# Patient Record
Sex: MALE | Race: WHITE | Employment: OTHER | URBAN - METROPOLITAN AREA
[De-identification: names, ages, dates, MRNs, and addresses within clinical notes are randomized per-mention and may not be internally consistent; named-entity substitution may affect disease eponyms.]

---

## 2021-01-01 ENCOUNTER — TELEPHONE (OUTPATIENT)
Dept: CARDIOLOGY CLINIC | Age: 71
End: 2021-01-01

## 2021-01-01 ENCOUNTER — APPOINTMENT (OUTPATIENT)
Dept: CT IMAGING | Age: 71
DRG: 871 | End: 2021-01-01
Payer: MEDICARE

## 2021-01-01 ENCOUNTER — APPOINTMENT (OUTPATIENT)
Dept: NUCLEAR MEDICINE | Age: 71
DRG: 300 | End: 2021-01-01
Payer: MEDICARE

## 2021-01-01 ENCOUNTER — OFFICE VISIT (OUTPATIENT)
Dept: VASCULAR SURGERY | Age: 71
End: 2021-01-01
Payer: MEDICARE

## 2021-01-01 ENCOUNTER — APPOINTMENT (OUTPATIENT)
Dept: GENERAL RADIOLOGY | Age: 71
End: 2021-01-01
Payer: MEDICARE

## 2021-01-01 ENCOUNTER — APPOINTMENT (OUTPATIENT)
Dept: MRI IMAGING | Age: 71
DRG: 300 | End: 2021-01-01
Payer: MEDICARE

## 2021-01-01 ENCOUNTER — HOSPITAL ENCOUNTER (OUTPATIENT)
Dept: ONCOLOGY | Age: 71
Setting detail: INFUSION SERIES
Discharge: HOME | End: 2021-10-07
Payer: MEDICARE

## 2021-01-01 ENCOUNTER — HOSPITAL ENCOUNTER (INPATIENT)
Age: 71
LOS: 5 days | Discharge: HOSPICE | DRG: 871 | End: 2021-12-15
Attending: EMERGENCY MEDICINE | Admitting: INTERNAL MEDICINE
Payer: MEDICARE

## 2021-01-01 ENCOUNTER — HOSPITAL ENCOUNTER (OUTPATIENT)
Age: 71
Setting detail: OUTPATIENT SURGERY
Discharge: HOME | End: 2021-11-08
Attending: SURGERY | Admitting: SURGERY
Payer: MEDICARE

## 2021-01-01 ENCOUNTER — HOSPITAL ENCOUNTER (EMERGENCY)
Age: 71
Discharge: HOME | End: 2021-09-06
Payer: MEDICARE

## 2021-01-01 ENCOUNTER — HOSPITAL ENCOUNTER (OUTPATIENT)
Age: 71
Discharge: HOME | End: 2021-10-14
Payer: MEDICARE

## 2021-01-01 ENCOUNTER — ANTI-COAG VISIT (OUTPATIENT)
Dept: PHARMACY | Age: 71
End: 2021-01-01
Payer: MEDICARE

## 2021-01-01 ENCOUNTER — APPOINTMENT (OUTPATIENT)
Dept: GENERAL RADIOLOGY | Age: 71
End: 2021-01-01
Attending: SURGERY
Payer: MEDICARE

## 2021-01-01 ENCOUNTER — ANESTHESIA EVENT (OUTPATIENT)
Dept: OPERATING ROOM | Age: 71
End: 2021-01-01
Payer: MEDICARE

## 2021-01-01 ENCOUNTER — HOSPITAL ENCOUNTER (OUTPATIENT)
Dept: VASCULAR LAB | Age: 71
Discharge: HOME | End: 2021-09-07
Payer: MEDICARE

## 2021-01-01 ENCOUNTER — APPOINTMENT (OUTPATIENT)
Dept: CT IMAGING | Age: 71
DRG: 300 | End: 2021-01-01
Payer: MEDICARE

## 2021-01-01 ENCOUNTER — HOSPITAL ENCOUNTER (OUTPATIENT)
Dept: NON INVASIVE DIAGNOSTICS | Age: 71
Discharge: HOME | End: 2021-11-22
Payer: MEDICARE

## 2021-01-01 ENCOUNTER — APPOINTMENT (OUTPATIENT)
Dept: GENERAL RADIOLOGY | Age: 71
DRG: 871 | End: 2021-01-01
Payer: MEDICARE

## 2021-01-01 ENCOUNTER — NURSE ONLY (OUTPATIENT)
Dept: CARDIOLOGY CLINIC | Age: 71
End: 2021-01-01

## 2021-01-01 ENCOUNTER — TELEPHONE (OUTPATIENT)
Dept: CARDIOLOGY CLINIC | Age: 71
End: 2021-01-01
Payer: MEDICARE

## 2021-01-01 ENCOUNTER — ANESTHESIA (OUTPATIENT)
Dept: OPERATING ROOM | Age: 71
End: 2021-01-01
Payer: MEDICARE

## 2021-01-01 ENCOUNTER — HOSPITAL ENCOUNTER (INPATIENT)
Age: 71
LOS: 6 days | Discharge: HOME | DRG: 300 | End: 2021-09-16
Attending: EMERGENCY MEDICINE | Admitting: INTERNAL MEDICINE
Payer: MEDICARE

## 2021-01-01 ENCOUNTER — HOSPITAL ENCOUNTER (OUTPATIENT)
Dept: MRI IMAGING | Age: 71
Discharge: HOME | End: 2021-10-13
Payer: MEDICARE

## 2021-01-01 VITALS
TEMPERATURE: 97.2 F | BODY MASS INDEX: 22.39 KG/M2 | HEART RATE: 72 BPM | SYSTOLIC BLOOD PRESSURE: 133 MMHG | DIASTOLIC BLOOD PRESSURE: 82 MMHG | OXYGEN SATURATION: 100 % | WEIGHT: 174.38 LBS | RESPIRATION RATE: 15 BRPM

## 2021-01-01 VITALS
BODY MASS INDEX: 23.61 KG/M2 | DIASTOLIC BLOOD PRESSURE: 68 MMHG | WEIGHT: 184 LBS | SYSTOLIC BLOOD PRESSURE: 118 MMHG | HEIGHT: 74 IN

## 2021-01-01 VITALS
OXYGEN SATURATION: 99 % | HEIGHT: 74 IN | TEMPERATURE: 97 F | WEIGHT: 164.7 LBS | BODY MASS INDEX: 21.14 KG/M2 | RESPIRATION RATE: 12 BRPM | SYSTOLIC BLOOD PRESSURE: 104 MMHG | HEART RATE: 79 BPM | DIASTOLIC BLOOD PRESSURE: 74 MMHG

## 2021-01-01 VITALS
BODY MASS INDEX: 22.78 KG/M2 | WEIGHT: 177.47 LBS | SYSTOLIC BLOOD PRESSURE: 117 MMHG | DIASTOLIC BLOOD PRESSURE: 66 MMHG | OXYGEN SATURATION: 95 % | HEART RATE: 69 BPM | TEMPERATURE: 97.9 F | RESPIRATION RATE: 18 BRPM | HEIGHT: 74 IN

## 2021-01-01 VITALS
DIASTOLIC BLOOD PRESSURE: 81 MMHG | OXYGEN SATURATION: 95 % | HEART RATE: 94 BPM | SYSTOLIC BLOOD PRESSURE: 122 MMHG | WEIGHT: 189.15 LBS | TEMPERATURE: 98.8 F | RESPIRATION RATE: 13 BRPM | BODY MASS INDEX: 24.28 KG/M2 | HEIGHT: 74 IN

## 2021-01-01 VITALS
SYSTOLIC BLOOD PRESSURE: 116 MMHG | RESPIRATION RATE: 18 BRPM | HEART RATE: 67 BPM | DIASTOLIC BLOOD PRESSURE: 65 MMHG | TEMPERATURE: 97.1 F

## 2021-01-01 VITALS — SYSTOLIC BLOOD PRESSURE: 106 MMHG | OXYGEN SATURATION: 95 % | DIASTOLIC BLOOD PRESSURE: 58 MMHG

## 2021-01-01 DIAGNOSIS — R94.31 EKG ABNORMALITIES: ICD-10-CM

## 2021-01-01 DIAGNOSIS — Z01.818 PREOP TESTING: Primary | ICD-10-CM

## 2021-01-01 DIAGNOSIS — I48.91 NEW ONSET ATRIAL FIBRILLATION (HCC): ICD-10-CM

## 2021-01-01 DIAGNOSIS — A41.9 SEPTICEMIA (HCC): ICD-10-CM

## 2021-01-01 DIAGNOSIS — I73.9 PERIPHERAL VASCULAR DISEASE (HCC): ICD-10-CM

## 2021-01-01 DIAGNOSIS — I70.221 ATHEROSCLEROSIS OF NATIVE ARTERIES OF EXTREMITIES WITH REST PAIN, RIGHT LEG (HCC): Primary | ICD-10-CM

## 2021-01-01 DIAGNOSIS — C64.9 MALIGNANT NEOPLASM OF KIDNEY, UNSPECIFIED LATERALITY (HCC): ICD-10-CM

## 2021-01-01 DIAGNOSIS — I72.4 POPLITEAL ARTERY ANEURYSM (HCC): ICD-10-CM

## 2021-01-01 DIAGNOSIS — M79.604 RIGHT LEG PAIN: Primary | ICD-10-CM

## 2021-01-01 DIAGNOSIS — R94.31 EKG ABNORMALITIES: Primary | ICD-10-CM

## 2021-01-01 DIAGNOSIS — R52 INTRACTABLE PAIN: ICD-10-CM

## 2021-01-01 DIAGNOSIS — C64.2 RENAL CANCER, LEFT (HCC): Primary | ICD-10-CM

## 2021-01-01 DIAGNOSIS — I48.92 ATRIAL FLUTTER, UNSPECIFIED TYPE (HCC): ICD-10-CM

## 2021-01-01 DIAGNOSIS — Z01.818 PREOP TESTING: ICD-10-CM

## 2021-01-01 DIAGNOSIS — I48.91 ATRIAL FIBRILLATION WITH RAPID VENTRICULAR RESPONSE (HCC): ICD-10-CM

## 2021-01-01 DIAGNOSIS — I48.0 PAROXYSMAL ATRIAL FIBRILLATION (HCC): ICD-10-CM

## 2021-01-01 DIAGNOSIS — C64.2 MALIGNANT NEOPLASM OF LEFT KIDNEY, EXCEPT RENAL PELVIS (HCC): ICD-10-CM

## 2021-01-01 DIAGNOSIS — J18.9 PNEUMONIA OF LEFT LOWER LOBE DUE TO INFECTIOUS ORGANISM: ICD-10-CM

## 2021-01-01 DIAGNOSIS — R79.89 ELEVATED D-DIMER: ICD-10-CM

## 2021-01-01 DIAGNOSIS — M79.604 RIGHT LEG PAIN: ICD-10-CM

## 2021-01-01 DIAGNOSIS — D64.9 ANEMIA, UNSPECIFIED TYPE: ICD-10-CM

## 2021-01-01 DIAGNOSIS — N28.89 LEFT KIDNEY MASS: Primary | ICD-10-CM

## 2021-01-01 DIAGNOSIS — I99.8 VASCULAR OCCLUSION: ICD-10-CM

## 2021-01-01 LAB
A/G RATIO: 0.7 (ref 1.1–2.2)
A/G RATIO: 0.8 (ref 1.1–2.2)
A/G RATIO: 0.8 (ref 1.1–2.2)
ABO/RH: NORMAL
ALBUMIN SERPL-MCNC: 2.8 G/DL (ref 3.4–5)
ALBUMIN SERPL-MCNC: 2.9 G/DL (ref 3.4–5)
ALBUMIN SERPL-MCNC: 2.9 G/DL (ref 3.4–5)
ALBUMIN SERPL-MCNC: 3.1 G/DL (ref 3.4–5)
ALBUMIN SERPL-MCNC: 3.3 G/DL (ref 3.4–5)
ALBUMIN SERPL-MCNC: 3.6 G/DL (ref 3.4–5)
ALP BLD-CCNC: 232 U/L (ref 40–129)
ALP BLD-CCNC: 253 U/L (ref 40–129)
ALP BLD-CCNC: 69 U/L (ref 40–129)
ALP BLD-CCNC: 85 U/L (ref 40–129)
ALT SERPL-CCNC: 14 U/L (ref 10–40)
ALT SERPL-CCNC: 8 U/L (ref 10–40)
ALT SERPL-CCNC: 9 U/L (ref 10–40)
ALT SERPL-CCNC: <5 U/L (ref 10–40)
AMYLASE: 28 U/L (ref 25–115)
ANION GAP SERPL CALCULATED.3IONS-SCNC: 11 MMOL/L (ref 3–16)
ANION GAP SERPL CALCULATED.3IONS-SCNC: 11 MMOL/L (ref 3–16)
ANION GAP SERPL CALCULATED.3IONS-SCNC: 12 MMOL/L (ref 3–16)
ANION GAP SERPL CALCULATED.3IONS-SCNC: 14 MMOL/L (ref 3–16)
ANION GAP SERPL CALCULATED.3IONS-SCNC: 18 MMOL/L (ref 3–16)
ANION GAP SERPL CALCULATED.3IONS-SCNC: 19 MMOL/L (ref 3–16)
ANION GAP SERPL CALCULATED.3IONS-SCNC: 19 MMOL/L (ref 3–16)
ANION GAP SERPL CALCULATED.3IONS-SCNC: 21 MMOL/L (ref 3–16)
ANION GAP SERPL CALCULATED.3IONS-SCNC: 22 MMOL/L (ref 3–16)
ANION GAP SERPL CALCULATED.3IONS-SCNC: 7 MMOL/L (ref 3–16)
ANION GAP SERPL CALCULATED.3IONS-SCNC: 8 MMOL/L (ref 3–16)
ANION GAP SERPL CALCULATED.3IONS-SCNC: 9 MMOL/L (ref 3–16)
ANTIBODY SCREEN: NORMAL
APTT: 33 SEC (ref 26.2–38.6)
APTT: 35.1 SEC (ref 26.2–38.6)
APTT: 35.3 SEC (ref 26.2–38.6)
APTT: 35.6 SEC (ref 26.2–38.6)
APTT: 35.7 SEC (ref 26.2–38.6)
APTT: 36.5 SEC (ref 26.2–38.6)
APTT: 37.1 SEC (ref 26.2–38.6)
APTT: 37.1 SEC (ref 26.2–38.6)
APTT: 38 SEC (ref 26.2–38.6)
APTT: 38.5 SEC (ref 26.2–38.6)
APTT: 42.7 SEC (ref 26.2–38.6)
APTT: 43.5 SEC (ref 26.2–38.6)
APTT: 43.8 SEC (ref 26.2–38.6)
APTT: 47.6 SEC (ref 26.2–38.6)
APTT: 49.2 SEC (ref 26.2–38.6)
APTT: 58 SEC (ref 26.2–38.6)
APTT: 76.8 SEC (ref 26.2–38.6)
APTT: 80.5 SEC (ref 26.2–38.6)
APTT: 95.8 SEC (ref 26.2–38.6)
AST SERPL-CCNC: 12 U/L (ref 15–37)
AST SERPL-CCNC: 19 U/L (ref 15–37)
AST SERPL-CCNC: 7 U/L (ref 15–37)
AST SERPL-CCNC: 7 U/L (ref 15–37)
BASE EXCESS ARTERIAL: -8.8 MMOL/L (ref -3–3)
BASE EXCESS VENOUS: -1.8 MMOL/L (ref -3–3)
BASE EXCESS VENOUS: -10.9 MMOL/L (ref -3–3)
BASOPHILS ABSOLUTE: 0 K/UL (ref 0–0.2)
BASOPHILS RELATIVE PERCENT: 0.1 %
BASOPHILS RELATIVE PERCENT: 0.2 %
BASOPHILS RELATIVE PERCENT: 0.5 %
BASOPHILS RELATIVE PERCENT: 0.7 %
BASOPHILS RELATIVE PERCENT: 0.7 %
BILIRUB SERPL-MCNC: 0.3 MG/DL (ref 0–1)
BILIRUB SERPL-MCNC: 0.3 MG/DL (ref 0–1)
BILIRUB SERPL-MCNC: 0.5 MG/DL (ref 0–1)
BILIRUB SERPL-MCNC: 0.6 MG/DL (ref 0–1)
BILIRUBIN DIRECT: <0.2 MG/DL (ref 0–0.3)
BILIRUBIN URINE: NEGATIVE
BILIRUBIN, INDIRECT: ABNORMAL MG/DL (ref 0–1)
BLOOD BANK DISPENSE STATUS: NORMAL
BLOOD BANK PRODUCT CODE: NORMAL
BLOOD CULTURE, ROUTINE: NORMAL
BLOOD, URINE: ABNORMAL
BLOOD, URINE: NEGATIVE
BPU ID: NORMAL
BUN BLDV-MCNC: 11 MG/DL (ref 7–20)
BUN BLDV-MCNC: 11 MG/DL (ref 7–20)
BUN BLDV-MCNC: 12 MG/DL (ref 7–20)
BUN BLDV-MCNC: 12 MG/DL (ref 7–20)
BUN BLDV-MCNC: 13 MG/DL (ref 7–20)
BUN BLDV-MCNC: 14 MG/DL (ref 7–20)
BUN BLDV-MCNC: 17 MG/DL (ref 7–20)
BUN BLDV-MCNC: 18 MG/DL (ref 7–20)
BUN BLDV-MCNC: 23 MG/DL (ref 7–20)
BUN BLDV-MCNC: 25 MG/DL (ref 7–20)
BUN BLDV-MCNC: 27 MG/DL (ref 7–20)
BUN BLDV-MCNC: 30 MG/DL (ref 7–20)
BUN BLDV-MCNC: 48 MG/DL (ref 7–20)
BUN BLDV-MCNC: 57 MG/DL (ref 7–20)
BUN BLDV-MCNC: 59 MG/DL (ref 7–20)
BUN BLDV-MCNC: 60 MG/DL (ref 7–20)
BUN BLDV-MCNC: 60 MG/DL (ref 7–20)
C DIFF TOXIN/ANTIGEN: NORMAL
C-REACTIVE PROTEIN: 167.6 MG/L (ref 0–5.1)
CALCIUM IONIZED: 0.74 MMOL/L (ref 1.12–1.32)
CALCIUM SERPL-MCNC: 10.3 MG/DL (ref 8.3–10.6)
CALCIUM SERPL-MCNC: 10.4 MG/DL (ref 8.3–10.6)
CALCIUM SERPL-MCNC: 10.4 MG/DL (ref 8.3–10.6)
CALCIUM SERPL-MCNC: 10.5 MG/DL (ref 8.3–10.6)
CALCIUM SERPL-MCNC: 5.5 MG/DL (ref 8.3–10.6)
CALCIUM SERPL-MCNC: 5.7 MG/DL (ref 8.3–10.6)
CALCIUM SERPL-MCNC: 5.9 MG/DL (ref 8.3–10.6)
CALCIUM SERPL-MCNC: 6.1 MG/DL (ref 8.3–10.6)
CALCIUM SERPL-MCNC: 6.9 MG/DL (ref 8.3–10.6)
CALCIUM SERPL-MCNC: 7.2 MG/DL (ref 8.3–10.6)
CALCIUM SERPL-MCNC: 7.7 MG/DL (ref 8.3–10.6)
CALCIUM SERPL-MCNC: 8.3 MG/DL (ref 8.3–10.6)
CALCIUM SERPL-MCNC: 8.4 MG/DL (ref 8.3–10.6)
CALCIUM SERPL-MCNC: 8.4 MG/DL (ref 8.3–10.6)
CALCIUM SERPL-MCNC: 9.9 MG/DL (ref 8.3–10.6)
CALCIUM SERPL-MCNC: 9.9 MG/DL (ref 8.3–10.6)
CARBOXYHEMOGLOBIN ARTERIAL: 1 % (ref 0–1.5)
CARBOXYHEMOGLOBIN: 4 % (ref 0–1.5)
CARBOXYHEMOGLOBIN: 4.2 % (ref 0–1.5)
CHLORIDE BLD-SCNC: 100 MMOL/L (ref 99–110)
CHLORIDE BLD-SCNC: 101 MMOL/L (ref 99–110)
CHLORIDE BLD-SCNC: 102 MMOL/L (ref 99–110)
CHLORIDE BLD-SCNC: 104 MMOL/L (ref 99–110)
CHLORIDE BLD-SCNC: 105 MMOL/L (ref 99–110)
CHLORIDE BLD-SCNC: 83 MMOL/L (ref 99–110)
CHLORIDE BLD-SCNC: 90 MMOL/L (ref 99–110)
CHLORIDE BLD-SCNC: 90 MMOL/L (ref 99–110)
CHLORIDE BLD-SCNC: 91 MMOL/L (ref 99–110)
CHLORIDE BLD-SCNC: 93 MMOL/L (ref 99–110)
CHLORIDE BLD-SCNC: 95 MMOL/L (ref 99–110)
CHLORIDE BLD-SCNC: 97 MMOL/L (ref 99–110)
CHLORIDE BLD-SCNC: 98 MMOL/L (ref 99–110)
CHLORIDE BLD-SCNC: 99 MMOL/L (ref 99–110)
CHOLESTEROL, TOTAL: 94 MG/DL (ref 0–199)
CLARITY: ABNORMAL
CO2: 14 MMOL/L (ref 21–32)
CO2: 14 MMOL/L (ref 21–32)
CO2: 15 MMOL/L (ref 21–32)
CO2: 16 MMOL/L (ref 21–32)
CO2: 16 MMOL/L (ref 21–32)
CO2: 17 MMOL/L (ref 21–32)
CO2: 21 MMOL/L (ref 21–32)
CO2: 23 MMOL/L (ref 21–32)
CO2: 23 MMOL/L (ref 21–32)
CO2: 24 MMOL/L (ref 21–32)
CO2: 25 MMOL/L (ref 21–32)
CO2: 26 MMOL/L (ref 21–32)
CO2: 27 MMOL/L (ref 21–32)
COLOR: YELLOW
COMMENT UA: ABNORMAL
COMMENT UA: ABNORMAL
CREAT SERPL-MCNC: 0.7 MG/DL (ref 0.8–1.3)
CREAT SERPL-MCNC: 0.8 MG/DL (ref 0.8–1.3)
CREAT SERPL-MCNC: 0.9 MG/DL (ref 0.8–1.3)
CREAT SERPL-MCNC: 1 MG/DL (ref 0.8–1.3)
CREAT SERPL-MCNC: 1.2 MG/DL (ref 0.8–1.3)
CREAT SERPL-MCNC: 1.3 MG/DL (ref 0.8–1.3)
CREAT SERPL-MCNC: 1.3 MG/DL (ref 0.8–1.3)
CREAT SERPL-MCNC: 1.8 MG/DL (ref 0.8–1.3)
CREAT SERPL-MCNC: 3.1 MG/DL (ref 0.8–1.3)
CREAT SERPL-MCNC: 3.7 MG/DL (ref 0.8–1.3)
CREAT SERPL-MCNC: 3.8 MG/DL (ref 0.8–1.3)
CREAT SERPL-MCNC: 3.9 MG/DL (ref 0.8–1.3)
CREAT SERPL-MCNC: 3.9 MG/DL (ref 0.8–1.3)
CREATININE URINE: 67 MG/DL (ref 39–259)
CREATININE URINE: 69.8 MG/DL (ref 39–259)
CULTURE, BLOOD 2: NORMAL
D DIMER: 1579 NG/ML DDU (ref 0–229)
D DIMER: 3514 NG/ML DDU (ref 0–229)
DAT POLYSPECIFIC: NORMAL
DESCRIPTION BLOOD BANK: NORMAL
EKG ATRIAL RATE: 326 BPM
EKG ATRIAL RATE: 78 BPM
EKG ATRIAL RATE: 78 BPM
EKG DIAGNOSIS: NORMAL
EKG P AXIS: 21 DEGREES
EKG P-R INTERVAL: 264 MS
EKG Q-T INTERVAL: 280 MS
EKG Q-T INTERVAL: 352 MS
EKG Q-T INTERVAL: 378 MS
EKG QRS DURATION: 86 MS
EKG QRS DURATION: 94 MS
EKG QRS DURATION: 96 MS
EKG QTC CALCULATION (BAZETT): 401 MS
EKG QTC CALCULATION (BAZETT): 401 MS
EKG QTC CALCULATION (BAZETT): 448 MS
EKG R AXIS: -31 DEGREES
EKG R AXIS: -43 DEGREES
EKG R AXIS: -47 DEGREES
EKG T AXIS: -2 DEGREES
EKG T AXIS: 55 DEGREES
EKG T AXIS: 62 DEGREES
EKG VENTRICULAR RATE: 154 BPM
EKG VENTRICULAR RATE: 68 BPM
EKG VENTRICULAR RATE: 78 BPM
EOSINOPHILS ABSOLUTE: 0 K/UL (ref 0–0.6)
EOSINOPHILS ABSOLUTE: 0.1 K/UL (ref 0–0.6)
EOSINOPHILS RELATIVE PERCENT: 0.1 %
EOSINOPHILS RELATIVE PERCENT: 0.1 %
EOSINOPHILS RELATIVE PERCENT: 0.2 %
EOSINOPHILS RELATIVE PERCENT: 1.2 %
EOSINOPHILS RELATIVE PERCENT: 1.2 %
EOSINOPHILS RELATIVE PERCENT: 1.6 %
EOSINOPHILS RELATIVE PERCENT: 1.7 %
EOSINOPHILS RELATIVE PERCENT: 2.6 %
EPITHELIAL CELLS, UA: 2 /HPF (ref 0–5)
EPITHELIAL CELLS, UA: 2 /HPF (ref 0–5)
EPITHELIAL CELLS, UA: 3 /HPF (ref 0–5)
EPITHELIAL CELLS, UA: 5 /HPF (ref 0–5)
ESTIMATED AVERAGE GLUCOSE: 114 MG/DL
FERRITIN: 1400 NG/ML (ref 30–400)
FERRITIN: 1436 NG/ML (ref 30–400)
FIBRINOGEN: 568 MG/DL (ref 200–397)
FOLATE: 3.53 NG/ML (ref 4.78–24.2)
GFR AFRICAN AMERICAN: 19
GFR AFRICAN AMERICAN: 20
GFR AFRICAN AMERICAN: 24
GFR AFRICAN AMERICAN: 45
GFR AFRICAN AMERICAN: >60
GFR NON-AFRICAN AMERICAN: 15
GFR NON-AFRICAN AMERICAN: 15
GFR NON-AFRICAN AMERICAN: 16
GFR NON-AFRICAN AMERICAN: 16
GFR NON-AFRICAN AMERICAN: 20
GFR NON-AFRICAN AMERICAN: 38
GFR NON-AFRICAN AMERICAN: 55
GFR NON-AFRICAN AMERICAN: 55
GFR NON-AFRICAN AMERICAN: 60
GFR NON-AFRICAN AMERICAN: >60
GLOBULIN: 4.1 G/DL
GLOBULIN: 4.4 G/DL
GLUCOSE BLD-MCNC: 102 MG/DL (ref 70–99)
GLUCOSE BLD-MCNC: 102 MG/DL (ref 70–99)
GLUCOSE BLD-MCNC: 103 MG/DL (ref 70–99)
GLUCOSE BLD-MCNC: 104 MG/DL (ref 70–99)
GLUCOSE BLD-MCNC: 104 MG/DL (ref 70–99)
GLUCOSE BLD-MCNC: 112 MG/DL (ref 70–99)
GLUCOSE BLD-MCNC: 114 MG/DL (ref 70–99)
GLUCOSE BLD-MCNC: 120 MG/DL (ref 70–99)
GLUCOSE BLD-MCNC: 132 MG/DL (ref 70–99)
GLUCOSE BLD-MCNC: 134 MG/DL (ref 70–99)
GLUCOSE BLD-MCNC: 140 MG/DL (ref 70–99)
GLUCOSE BLD-MCNC: 142 MG/DL (ref 70–99)
GLUCOSE BLD-MCNC: 145 MG/DL (ref 70–99)
GLUCOSE BLD-MCNC: 148 MG/DL (ref 70–99)
GLUCOSE BLD-MCNC: 149 MG/DL (ref 70–99)
GLUCOSE BLD-MCNC: 165 MG/DL (ref 70–99)
GLUCOSE BLD-MCNC: 176 MG/DL (ref 70–99)
GLUCOSE BLD-MCNC: 233 MG/DL (ref 70–99)
GLUCOSE BLD-MCNC: 92 MG/DL (ref 70–99)
GLUCOSE BLD-MCNC: 94 MG/DL (ref 70–99)
GLUCOSE BLD-MCNC: 97 MG/DL (ref 70–99)
GLUCOSE URINE: 100 MG/DL
GLUCOSE URINE: NEGATIVE MG/DL
HBA1C MFR BLD: 5.6 %
HCO3 ARTERIAL: 13 MMOL/L (ref 21–29)
HCO3 VENOUS: 14.3 MMOL/L (ref 23–29)
HCO3 VENOUS: 22.7 MMOL/L (ref 23–29)
HCT VFR BLD CALC: 21.7 % (ref 40.5–52.5)
HCT VFR BLD CALC: 22.3 % (ref 40.5–52.5)
HCT VFR BLD CALC: 22.5 % (ref 40.5–52.5)
HCT VFR BLD CALC: 22.6 % (ref 40.5–52.5)
HCT VFR BLD CALC: 23.1 % (ref 40.5–52.5)
HCT VFR BLD CALC: 23.5 % (ref 40.5–52.5)
HCT VFR BLD CALC: 23.7 % (ref 40.5–52.5)
HCT VFR BLD CALC: 24.1 % (ref 40.5–52.5)
HCT VFR BLD CALC: 24.2 % (ref 40.5–52.5)
HCT VFR BLD CALC: 24.5 % (ref 40.5–52.5)
HCT VFR BLD CALC: 25.6 % (ref 40.5–52.5)
HCT VFR BLD CALC: 26.5 % (ref 40.5–52.5)
HCT VFR BLD CALC: 28.5 % (ref 40.5–52.5)
HCT VFR BLD CALC: 30.1 % (ref 40.5–52.5)
HCT VFR BLD CALC: 32.5 % (ref 40.5–52.5)
HDLC SERPL-MCNC: 27 MG/DL (ref 40–60)
HEMATOLOGY PATH CONSULT: NO
HEMATOLOGY PATH CONSULT: NORMAL
HEMATOLOGY PATH CONSULT: YES
HEMOGLOBIN, ART, EXTENDED: 8.6 G/DL (ref 13.5–17.5)
HEMOGLOBIN, VEN, REDUCED: 19 %
HEMOGLOBIN: 10.3 G/DL (ref 13.5–17.5)
HEMOGLOBIN: 7.1 G/DL (ref 13.5–17.5)
HEMOGLOBIN: 7.1 G/DL (ref 13.5–17.5)
HEMOGLOBIN: 7.2 G/DL (ref 13.5–17.5)
HEMOGLOBIN: 7.3 G/DL (ref 13.5–17.5)
HEMOGLOBIN: 7.4 G/DL (ref 13.5–17.5)
HEMOGLOBIN: 7.6 G/DL (ref 13.5–17.5)
HEMOGLOBIN: 7.7 G/DL (ref 13.5–17.5)
HEMOGLOBIN: 7.9 G/DL (ref 13.5–17.5)
HEMOGLOBIN: 8 G/DL (ref 13.5–17.5)
HEMOGLOBIN: 8.1 G/DL (ref 13.5–17.5)
HEMOGLOBIN: 8.3 G/DL (ref 13.5–17.5)
HEMOGLOBIN: 8.5 G/DL (ref 13.5–17.5)
HEMOGLOBIN: 8.8 G/DL (ref 13.5–17.5)
HEMOGLOBIN: 9.7 G/DL (ref 13.5–17.5)
HYALINE CASTS: 0 /LPF (ref 0–8)
HYALINE CASTS: 5 /LPF (ref 0–8)
HYALINE CASTS: 7 /LPF (ref 0–8)
HYALINE CASTS: 8 /LPF (ref 0–8)
IMMATURE RETIC FRACT: 0.36 (ref 0.21–0.37)
INR BLD: 1.21 (ref 0.88–1.12)
INR BLD: 1.43 (ref 0.88–1.12)
INR BLD: 1.8 (ref 0.88–1.12)
IRON SATURATION: 14 % (ref 20–50)
IRON SATURATION: 8 % (ref 20–50)
IRON: 14 UG/DL (ref 59–158)
IRON: 21 UG/DL (ref 59–158)
KETONES, URINE: NEGATIVE MG/DL
L. PNEUMOPHILA SEROGP 1 UR AG: NORMAL
LACTATE DEHYDROGENASE: 173 U/L (ref 100–190)
LACTATE DEHYDROGENASE: 244 U/L (ref 100–190)
LACTIC ACID, SEPSIS: 3.7 MMOL/L (ref 0.4–1.9)
LACTIC ACID, SEPSIS: 4.2 MMOL/L (ref 0.4–1.9)
LACTIC ACID: 2.1 MMOL/L (ref 0.4–2)
LACTIC ACID: 3 MMOL/L (ref 0.4–2)
LACTIC ACID: 3.2 MMOL/L (ref 0.4–2)
LACTIC ACID: 3.7 MMOL/L (ref 0.4–2)
LACTIC ACID: 4.4 MMOL/L (ref 0.4–2)
LDL CHOLESTEROL CALCULATED: 48 MG/DL
LEUKOCYTE ESTERASE, URINE: ABNORMAL
LEUKOCYTE ESTERASE, URINE: NEGATIVE
LIPASE: 10 U/L (ref 13–60)
LV EF: 60 %
LV EF: 65 %
LVEF MODALITY: NORMAL
LVEF MODALITY: NORMAL
LYMPHOCYTES ABSOLUTE: 0.3 K/UL (ref 1–5.1)
LYMPHOCYTES ABSOLUTE: 0.4 K/UL (ref 1–5.1)
LYMPHOCYTES ABSOLUTE: 0.5 K/UL (ref 1–5.1)
LYMPHOCYTES ABSOLUTE: 0.7 K/UL (ref 1–5.1)
LYMPHOCYTES ABSOLUTE: 0.9 K/UL (ref 1–5.1)
LYMPHOCYTES ABSOLUTE: 1.1 K/UL (ref 1–5.1)
LYMPHOCYTES RELATIVE PERCENT: 10.3 %
LYMPHOCYTES RELATIVE PERCENT: 11.7 %
LYMPHOCYTES RELATIVE PERCENT: 14 %
LYMPHOCYTES RELATIVE PERCENT: 14.2 %
LYMPHOCYTES RELATIVE PERCENT: 15.1 %
LYMPHOCYTES RELATIVE PERCENT: 15.1 %
LYMPHOCYTES RELATIVE PERCENT: 15.7 %
LYMPHOCYTES RELATIVE PERCENT: 9.5 %
MAGNESIUM: 1.6 MG/DL (ref 1.8–2.4)
MAGNESIUM: 2.1 MG/DL (ref 1.8–2.4)
MAGNESIUM: 2.3 MG/DL (ref 1.8–2.4)
MAGNESIUM: 2.4 MG/DL (ref 1.8–2.4)
MAGNESIUM: 2.4 MG/DL (ref 1.8–2.4)
MCH RBC QN AUTO: 22.1 PG (ref 26–34)
MCH RBC QN AUTO: 22.3 PG (ref 26–34)
MCH RBC QN AUTO: 22.3 PG (ref 26–34)
MCH RBC QN AUTO: 22.4 PG (ref 26–34)
MCH RBC QN AUTO: 22.5 PG (ref 26–34)
MCH RBC QN AUTO: 22.5 PG (ref 26–34)
MCH RBC QN AUTO: 22.6 PG (ref 26–34)
MCH RBC QN AUTO: 22.7 PG (ref 26–34)
MCH RBC QN AUTO: 22.7 PG (ref 26–34)
MCH RBC QN AUTO: 23 PG (ref 26–34)
MCH RBC QN AUTO: 23.2 PG (ref 26–34)
MCH RBC QN AUTO: 23.3 PG (ref 26–34)
MCH RBC QN AUTO: 23.3 PG (ref 26–34)
MCH RBC QN AUTO: 23.6 PG (ref 26–34)
MCH RBC QN AUTO: 23.6 PG (ref 26–34)
MCHC RBC AUTO-ENTMCNC: 30.8 G/DL (ref 31–36)
MCHC RBC AUTO-ENTMCNC: 31.5 G/DL (ref 31–36)
MCHC RBC AUTO-ENTMCNC: 31.6 G/DL (ref 31–36)
MCHC RBC AUTO-ENTMCNC: 31.9 G/DL (ref 31–36)
MCHC RBC AUTO-ENTMCNC: 32.2 G/DL (ref 31–36)
MCHC RBC AUTO-ENTMCNC: 32.4 G/DL (ref 31–36)
MCHC RBC AUTO-ENTMCNC: 32.5 G/DL (ref 31–36)
MCHC RBC AUTO-ENTMCNC: 32.5 G/DL (ref 31–36)
MCHC RBC AUTO-ENTMCNC: 32.6 G/DL (ref 31–36)
MCHC RBC AUTO-ENTMCNC: 32.8 G/DL (ref 31–36)
MCHC RBC AUTO-ENTMCNC: 32.9 G/DL (ref 31–36)
MCHC RBC AUTO-ENTMCNC: 33 G/DL (ref 31–36)
MCV RBC AUTO: 68.8 FL (ref 80–100)
MCV RBC AUTO: 69.2 FL (ref 80–100)
MCV RBC AUTO: 69.3 FL (ref 80–100)
MCV RBC AUTO: 69.4 FL (ref 80–100)
MCV RBC AUTO: 69.5 FL (ref 80–100)
MCV RBC AUTO: 69.7 FL (ref 80–100)
MCV RBC AUTO: 70 FL (ref 80–100)
MCV RBC AUTO: 70.1 FL (ref 80–100)
MCV RBC AUTO: 70.9 FL (ref 80–100)
MCV RBC AUTO: 71.1 FL (ref 80–100)
MCV RBC AUTO: 71.3 FL (ref 80–100)
MCV RBC AUTO: 71.7 FL (ref 80–100)
MCV RBC AUTO: 72.9 FL (ref 80–100)
MCV RBC AUTO: 73.5 FL (ref 80–100)
MCV RBC AUTO: 74.5 FL (ref 80–100)
METHEMOGLOBIN ARTERIAL: 0.7 %
METHEMOGLOBIN VENOUS: 0.1 %
METHEMOGLOBIN VENOUS: 0.5 %
MICROSCOPIC EXAMINATION: YES
MONOCYTES ABSOLUTE: 0.2 K/UL (ref 0–1.3)
MONOCYTES ABSOLUTE: 0.3 K/UL (ref 0–1.3)
MONOCYTES ABSOLUTE: 0.3 K/UL (ref 0–1.3)
MONOCYTES ABSOLUTE: 0.4 K/UL (ref 0–1.3)
MONOCYTES ABSOLUTE: 0.4 K/UL (ref 0–1.3)
MONOCYTES ABSOLUTE: 0.5 K/UL (ref 0–1.3)
MONOCYTES ABSOLUTE: 0.6 K/UL (ref 0–1.3)
MONOCYTES ABSOLUTE: 0.6 K/UL (ref 0–1.3)
MONOCYTES RELATIVE PERCENT: 10.4 %
MONOCYTES RELATIVE PERCENT: 11.7 %
MONOCYTES RELATIVE PERCENT: 11.8 %
MONOCYTES RELATIVE PERCENT: 5.9 %
MONOCYTES RELATIVE PERCENT: 6.1 %
MONOCYTES RELATIVE PERCENT: 7.5 %
MONOCYTES RELATIVE PERCENT: 9.6 %
MONOCYTES RELATIVE PERCENT: 9.8 %
NEUTROPHILS ABSOLUTE: 1.9 K/UL (ref 1.7–7.7)
NEUTROPHILS ABSOLUTE: 2.2 K/UL (ref 1.7–7.7)
NEUTROPHILS ABSOLUTE: 2.8 K/UL (ref 1.7–7.7)
NEUTROPHILS ABSOLUTE: 3.4 K/UL (ref 1.7–7.7)
NEUTROPHILS ABSOLUTE: 3.6 K/UL (ref 1.7–7.7)
NEUTROPHILS ABSOLUTE: 3.7 K/UL (ref 1.7–7.7)
NEUTROPHILS ABSOLUTE: 4.9 K/UL (ref 1.7–7.7)
NEUTROPHILS ABSOLUTE: 5.5 K/UL (ref 1.7–7.7)
NEUTROPHILS RELATIVE PERCENT: 69.9 %
NEUTROPHILS RELATIVE PERCENT: 72.3 %
NEUTROPHILS RELATIVE PERCENT: 74.1 %
NEUTROPHILS RELATIVE PERCENT: 75.3 %
NEUTROPHILS RELATIVE PERCENT: 75.3 %
NEUTROPHILS RELATIVE PERCENT: 78.4 %
NEUTROPHILS RELATIVE PERCENT: 82.5 %
NEUTROPHILS RELATIVE PERCENT: 82.6 %
NITRITE, URINE: NEGATIVE
O2 CONTENT, VEN: 10 VOL %
O2 CONTENT, VEN: 11 VOL %
O2 SAT, ARTERIAL: 97.4 %
O2 SAT, VEN: 100 %
O2 SAT, VEN: 80 %
O2 THERAPY: ABNORMAL
OSMOLALITY URINE: 269 MOSM/KG (ref 390–1070)
PARATHYROID HORMONE INTACT: 172.4 PG/ML (ref 14–72)
PCO2 ARTERIAL: 17.2 MMHG (ref 35–45)
PCO2, VEN: 29 MMHG (ref 40–50)
PCO2, VEN: 36.4 MMHG (ref 40–50)
PDW BLD-RTO: 14.7 % (ref 12.4–15.4)
PDW BLD-RTO: 14.8 % (ref 12.4–15.4)
PDW BLD-RTO: 14.9 % (ref 12.4–15.4)
PDW BLD-RTO: 15 % (ref 12.4–15.4)
PDW BLD-RTO: 15.1 % (ref 12.4–15.4)
PDW BLD-RTO: 15.1 % (ref 12.4–15.4)
PDW BLD-RTO: 15.2 % (ref 12.4–15.4)
PDW BLD-RTO: 18.6 % (ref 12.4–15.4)
PDW BLD-RTO: 18.7 % (ref 12.4–15.4)
PDW BLD-RTO: 18.8 % (ref 12.4–15.4)
PDW BLD-RTO: 18.9 % (ref 12.4–15.4)
PDW BLD-RTO: 19.7 % (ref 12.4–15.4)
PDW BLD-RTO: 20.1 % (ref 12.4–15.4)
PERFORMED ON: ABNORMAL
PERFORMED ON: ABNORMAL
PH ARTERIAL: 7.49 (ref 7.35–7.45)
PH UA: 5 (ref 5–8)
PH UA: 5.5 (ref 5–8)
PH UA: 6 (ref 5–8)
PH UA: 6 (ref 5–8)
PH VENOUS: 7.3 (ref 7.35–7.45)
PH VENOUS: 7.36 (ref 7.35–7.45)
PH VENOUS: 7.4 (ref 7.35–7.45)
PHOSPHORUS: 2.6 MG/DL (ref 2.5–4.9)
PHOSPHORUS: 3.7 MG/DL (ref 2.5–4.9)
PHOSPHORUS: 3.8 MG/DL (ref 2.5–4.9)
PHOSPHORUS: 6.1 MG/DL (ref 2.5–4.9)
PLATELET # BLD: 266 K/UL (ref 135–450)
PLATELET # BLD: 266 K/UL (ref 135–450)
PLATELET # BLD: 311 K/UL (ref 135–450)
PLATELET # BLD: 319 K/UL (ref 135–450)
PLATELET # BLD: 328 K/UL (ref 135–450)
PLATELET # BLD: 334 K/UL (ref 135–450)
PLATELET # BLD: 339 K/UL (ref 135–450)
PLATELET # BLD: 372 K/UL (ref 135–450)
PLATELET # BLD: 384 K/UL (ref 135–450)
PLATELET # BLD: 385 K/UL (ref 135–450)
PLATELET # BLD: 393 K/UL (ref 135–450)
PLATELET # BLD: 402 K/UL (ref 135–450)
PLATELET # BLD: 418 K/UL (ref 135–450)
PLATELET # BLD: 442 K/UL (ref 135–450)
PLATELET # BLD: 452 K/UL (ref 135–450)
PLATELET SLIDE REVIEW: ADEQUATE
PMV BLD AUTO: 6.5 FL (ref 5–10.5)
PMV BLD AUTO: 6.7 FL (ref 5–10.5)
PMV BLD AUTO: 6.8 FL (ref 5–10.5)
PMV BLD AUTO: 7 FL (ref 5–10.5)
PMV BLD AUTO: 7.1 FL (ref 5–10.5)
PMV BLD AUTO: 7.1 FL (ref 5–10.5)
PMV BLD AUTO: 7.2 FL (ref 5–10.5)
PMV BLD AUTO: 7.2 FL (ref 5–10.5)
PMV BLD AUTO: 7.3 FL (ref 5–10.5)
PMV BLD AUTO: 7.3 FL (ref 5–10.5)
PO2 ARTERIAL: 80.8 MMHG (ref 75–108)
PO2, VEN: 127 MMHG (ref 25–40)
PO2, VEN: 47.6 MMHG (ref 25–40)
POTASSIUM REFLEX MAGNESIUM: 3.5 MMOL/L (ref 3.5–5.1)
POTASSIUM REFLEX MAGNESIUM: 4.2 MMOL/L (ref 3.5–5.1)
POTASSIUM REFLEX MAGNESIUM: 4.2 MMOL/L (ref 3.5–5.1)
POTASSIUM REFLEX MAGNESIUM: 4.4 MMOL/L (ref 3.5–5.1)
POTASSIUM REFLEX MAGNESIUM: 4.4 MMOL/L (ref 3.5–5.1)
POTASSIUM REFLEX MAGNESIUM: 4.6 MMOL/L (ref 3.5–5.1)
POTASSIUM REFLEX MAGNESIUM: 4.8 MMOL/L (ref 3.5–5.1)
POTASSIUM SERPL-SCNC: 2.8 MMOL/L (ref 3.5–5.1)
POTASSIUM SERPL-SCNC: 2.9 MMOL/L (ref 3.5–5.1)
POTASSIUM SERPL-SCNC: 3.2 MMOL/L (ref 3.5–5.1)
POTASSIUM SERPL-SCNC: 3.3 MMOL/L (ref 3.5–5.1)
POTASSIUM SERPL-SCNC: 3.6 MMOL/L (ref 3.5–5.1)
POTASSIUM SERPL-SCNC: 4.1 MMOL/L (ref 3.5–5.1)
POTASSIUM SERPL-SCNC: 4.6 MMOL/L (ref 3.5–5.1)
POTASSIUM SERPL-SCNC: 4.7 MMOL/L (ref 3.5–5.1)
POTASSIUM SERPL-SCNC: 4.8 MMOL/L (ref 3.5–5.1)
POTASSIUM SERPL-SCNC: 5 MMOL/L (ref 3.5–5.1)
POTASSIUM SERPL-SCNC: 5 MMOL/L (ref 3.5–5.1)
POTASSIUM SERPL-SCNC: 5.1 MMOL/L (ref 3.5–5.1)
POTASSIUM SERPL-SCNC: 5.6 MMOL/L (ref 3.5–5.1)
PRO-BNP: ABNORMAL PG/ML (ref 0–124)
PROCALCITONIN: 7.29 NG/ML (ref 0–0.15)
PROCALCITONIN: 9.8 NG/ML (ref 0–0.15)
PROTEIN PROTEIN: 22 MG/DL
PROTEIN PROTEIN: 68 MG/DL
PROTEIN UA: 100 MG/DL
PROTEIN UA: 30 MG/DL
PROTEIN UA: ABNORMAL MG/DL
PROTEIN UA: NEGATIVE MG/DL
PROTEIN/CREAT RATIO: 1 MG/DL
PROTHROMBIN TIME: 13.8 SEC (ref 9.9–12.7)
PROTHROMBIN TIME: 16.4 SEC (ref 9.9–12.7)
PROTHROMBIN TIME: 20.9 SEC (ref 9.9–12.7)
RBC # BLD: 3.06 M/UL (ref 4.2–5.9)
RBC # BLD: 3.16 M/UL (ref 4.2–5.9)
RBC # BLD: 3.18 M/UL (ref 4.2–5.9)
RBC # BLD: 3.22 M/UL (ref 4.2–5.9)
RBC # BLD: 3.25 M/UL (ref 4.2–5.9)
RBC # BLD: 3.38 M/UL (ref 4.2–5.9)
RBC # BLD: 3.38 M/UL (ref 4.2–5.9)
RBC # BLD: 3.4 M/UL (ref 4.2–5.9)
RBC # BLD: 3.5 M/UL (ref 4.2–5.9)
RBC # BLD: 3.52 M/UL (ref 4.2–5.9)
RBC # BLD: 3.61 M/UL (ref 4.2–5.9)
RBC # BLD: 3.68 M/UL (ref 4.2–5.9)
RBC # BLD: 3.98 M/UL (ref 4.2–5.9)
RBC # BLD: 4.29 M/UL (ref 4.2–5.9)
RBC # BLD: 4.36 M/UL (ref 4.2–5.9)
RBC UA: 3 /HPF (ref 0–4)
RBC UA: 4 /HPF (ref 0–4)
RETICULOCYTE ABSOLUTE COUNT: 0.06 M/UL
RETICULOCYTE COUNT PCT: 1.76 % (ref 0.5–2.18)
SARS-COV-2: NOT DETECTED
SARS-COV-2: NOT DETECTED
SEDIMENTATION RATE, ERYTHROCYTE: 126 MM/HR (ref 0–20)
SLIDE REVIEW: ABNORMAL
SODIUM BLD-SCNC: 123 MMOL/L (ref 136–145)
SODIUM BLD-SCNC: 127 MMOL/L (ref 136–145)
SODIUM BLD-SCNC: 128 MMOL/L (ref 136–145)
SODIUM BLD-SCNC: 128 MMOL/L (ref 136–145)
SODIUM BLD-SCNC: 130 MMOL/L (ref 136–145)
SODIUM BLD-SCNC: 132 MMOL/L (ref 136–145)
SODIUM BLD-SCNC: 132 MMOL/L (ref 136–145)
SODIUM BLD-SCNC: 133 MMOL/L (ref 136–145)
SODIUM BLD-SCNC: 134 MMOL/L (ref 136–145)
SODIUM BLD-SCNC: 135 MMOL/L (ref 136–145)
SODIUM BLD-SCNC: 137 MMOL/L (ref 136–145)
SODIUM BLD-SCNC: 138 MMOL/L (ref 136–145)
SODIUM BLD-SCNC: 138 MMOL/L (ref 136–145)
SODIUM URINE: 39 MMOL/L
SODIUM URINE: <20 MMOL/L
SOLUBLE TRANSFERRIN RECEPT: 4.4 MG/L (ref 2.2–5)
SPECIFIC GRAVITY UA: 1.01 (ref 1–1.03)
SPECIFIC GRAVITY UA: 1.01 (ref 1–1.03)
SPECIFIC GRAVITY UA: 1.02 (ref 1–1.03)
SPECIFIC GRAVITY UA: >1.03 (ref 1–1.03)
STREP PNEUMONIAE ANTIGEN, URINE: NORMAL
T4 FREE: 0.9 NG/DL (ref 0.9–1.8)
T4 FREE: 1 NG/DL (ref 0.9–1.8)
TCO2 ARTERIAL: 30.4 MMOL/L
TCO2 CALC VENOUS: 34 MMOL/L
TCO2 CALC VENOUS: 53 MMOL/L
TOTAL IRON BINDING CAPACITY: 153 UG/DL (ref 260–445)
TOTAL IRON BINDING CAPACITY: 179 UG/DL (ref 260–445)
TOTAL PROTEIN: 7 G/DL (ref 6.4–8.2)
TOTAL PROTEIN: 7.7 G/DL (ref 6.4–8.2)
TOTAL PROTEIN: 8 G/DL (ref 6.4–8.2)
TOTAL PROTEIN: 8.1 G/DL (ref 6.4–8.2)
TRIGL SERPL-MCNC: 95 MG/DL (ref 0–150)
TROPONIN: 0.02 NG/ML
TROPONIN: 0.02 NG/ML
TROPONIN: 0.03 NG/ML
TROPONIN: 0.04 NG/ML
TSH REFLEX: 26.23 UIU/ML (ref 0.27–4.2)
TSH REFLEX: 6.84 UIU/ML (ref 0.27–4.2)
URIC ACID, SERUM: 10.3 MG/DL (ref 3.5–7.2)
URINE CULTURE, ROUTINE: NORMAL
URINE CULTURE, ROUTINE: NORMAL
URINE TYPE: ABNORMAL
UROBILINOGEN, URINE: 0.2 E.U./DL
UROBILINOGEN, URINE: 1 E.U./DL
VITAMIN B-12: 698 PG/ML (ref 211–911)
VITAMIN D 25-HYDROXY: 15.5 NG/ML
VLDLC SERPL CALC-MCNC: 19 MG/DL
WBC # BLD: 2.6 K/UL (ref 4–11)
WBC # BLD: 2.6 K/UL (ref 4–11)
WBC # BLD: 3.7 K/UL (ref 4–11)
WBC # BLD: 4.5 K/UL (ref 4–11)
WBC # BLD: 4.5 K/UL (ref 4–11)
WBC # BLD: 4.9 K/UL (ref 4–11)
WBC # BLD: 5.1 K/UL (ref 4–11)
WBC # BLD: 5.4 K/UL (ref 4–11)
WBC # BLD: 6.5 K/UL (ref 4–11)
WBC # BLD: 6.8 K/UL (ref 4–11)
WBC # BLD: 6.9 K/UL (ref 4–11)
WBC # BLD: 7.3 K/UL (ref 4–11)
WBC UA: 10 /HPF (ref 0–5)
WBC UA: 121 /HPF (ref 0–5)
WBC UA: 16 /HPF (ref 0–5)
WBC UA: 26 /HPF (ref 0–5)
YEAST: PRESENT /HPF
YEAST: PRESENT /HPF

## 2021-01-01 PROCEDURE — 6360000002 HC RX W HCPCS: Performed by: NURSE PRACTITIONER

## 2021-01-01 PROCEDURE — 84443 ASSAY THYROID STIM HORMONE: CPT

## 2021-01-01 PROCEDURE — 2580000003 HC RX 258: Performed by: INTERNAL MEDICINE

## 2021-01-01 PROCEDURE — 2580000003 HC RX 258: Performed by: ANESTHESIOLOGY

## 2021-01-01 PROCEDURE — 36430 TRANSFUSION BLD/BLD COMPNT: CPT

## 2021-01-01 PROCEDURE — 80048 BASIC METABOLIC PNL TOTAL CA: CPT

## 2021-01-01 PROCEDURE — 88342 IMHCHEM/IMCYTCHM 1ST ANTB: CPT

## 2021-01-01 PROCEDURE — 1200000000 HC SEMI PRIVATE

## 2021-01-01 PROCEDURE — 82803 BLOOD GASES ANY COMBINATION: CPT

## 2021-01-01 PROCEDURE — 36415 COLL VENOUS BLD VENIPUNCTURE: CPT

## 2021-01-01 PROCEDURE — 86140 C-REACTIVE PROTEIN: CPT

## 2021-01-01 PROCEDURE — 85610 PROTHROMBIN TIME: CPT

## 2021-01-01 PROCEDURE — 6370000000 HC RX 637 (ALT 250 FOR IP): Performed by: INTERNAL MEDICINE

## 2021-01-01 PROCEDURE — 2580000003 HC RX 258: Performed by: NURSE ANESTHETIST, CERTIFIED REGISTERED

## 2021-01-01 PROCEDURE — APPSS30 APP SPLIT SHARED TIME 16-30 MINUTES: Performed by: NURSE PRACTITIONER

## 2021-01-01 PROCEDURE — 94640 AIRWAY INHALATION TREATMENT: CPT

## 2021-01-01 PROCEDURE — 2500000003 HC RX 250 WO HCPCS: Performed by: INTERNAL MEDICINE

## 2021-01-01 PROCEDURE — 99205 OFFICE O/P NEW HI 60 MIN: CPT | Performed by: INTERNAL MEDICINE

## 2021-01-01 PROCEDURE — 71260 CT THORAX DX C+: CPT

## 2021-01-01 PROCEDURE — 6370000000 HC RX 637 (ALT 250 FOR IP): Performed by: SURGERY

## 2021-01-01 PROCEDURE — 92526 ORAL FUNCTION THERAPY: CPT

## 2021-01-01 PROCEDURE — 85379 FIBRIN DEGRADATION QUANT: CPT

## 2021-01-01 PROCEDURE — 83550 IRON BINDING TEST: CPT

## 2021-01-01 PROCEDURE — 87449 NOS EACH ORGANISM AG IA: CPT

## 2021-01-01 PROCEDURE — 6360000002 HC RX W HCPCS: Performed by: INTERNAL MEDICINE

## 2021-01-01 PROCEDURE — B41F1ZZ FLUOROSCOPY OF RIGHT LOWER EXTREMITY ARTERIES USING LOW OSMOLAR CONTRAST: ICD-10-PCS | Performed by: SURGERY

## 2021-01-01 PROCEDURE — 74177 CT ABD & PELVIS W/CONTRAST: CPT

## 2021-01-01 PROCEDURE — 83735 ASSAY OF MAGNESIUM: CPT

## 2021-01-01 PROCEDURE — 85730 THROMBOPLASTIN TIME PARTIAL: CPT

## 2021-01-01 PROCEDURE — 6360000002 HC RX W HCPCS: Performed by: SURGERY

## 2021-01-01 PROCEDURE — C1830 POWER BONE MARROW BX NEEDLE: HCPCS

## 2021-01-01 PROCEDURE — 6360000004 HC RX CONTRAST MEDICATION: Performed by: INTERNAL MEDICINE

## 2021-01-01 PROCEDURE — 88341 IMHCHEM/IMCYTCHM EA ADD ANTB: CPT

## 2021-01-01 PROCEDURE — 99223 1ST HOSP IP/OBS HIGH 75: CPT | Performed by: INTERNAL MEDICINE

## 2021-01-01 PROCEDURE — 82746 ASSAY OF FOLIC ACID SERUM: CPT

## 2021-01-01 PROCEDURE — C1788 PORT, INDWELLING, IMP: HCPCS | Performed by: SURGERY

## 2021-01-01 PROCEDURE — 82330 ASSAY OF CALCIUM: CPT

## 2021-01-01 PROCEDURE — 99152 MOD SED SAME PHYS/QHP 5/>YRS: CPT

## 2021-01-01 PROCEDURE — 84100 ASSAY OF PHOSPHORUS: CPT

## 2021-01-01 PROCEDURE — 99231 SBSQ HOSP IP/OBS SF/LOW 25: CPT | Performed by: SURGERY

## 2021-01-01 PROCEDURE — 3430000000 HC RX DIAGNOSTIC RADIOPHARMACEUTICAL: Performed by: INTERNAL MEDICINE

## 2021-01-01 PROCEDURE — 3700000001 HC ADD 15 MINUTES (ANESTHESIA): Performed by: SURGERY

## 2021-01-01 PROCEDURE — 80053 COMPREHEN METABOLIC PANEL: CPT

## 2021-01-01 PROCEDURE — 85025 COMPLETE CBC W/AUTO DIFF WBC: CPT

## 2021-01-01 PROCEDURE — C1894 INTRO/SHEATH, NON-LASER: HCPCS

## 2021-01-01 PROCEDURE — 93306 TTE W/DOPPLER COMPLETE: CPT

## 2021-01-01 PROCEDURE — 76937 US GUIDE VASCULAR ACCESS: CPT | Performed by: SURGERY

## 2021-01-01 PROCEDURE — 84484 ASSAY OF TROPONIN QUANT: CPT

## 2021-01-01 PROCEDURE — 2580000003 HC RX 258: Performed by: SURGERY

## 2021-01-01 PROCEDURE — 36200 PLACE CATHETER IN AORTA: CPT

## 2021-01-01 PROCEDURE — 2500000003 HC RX 250 WO HCPCS: Performed by: NURSE ANESTHETIST, CERTIFIED REGISTERED

## 2021-01-01 PROCEDURE — 93926 LOWER EXTREMITY STUDY: CPT

## 2021-01-01 PROCEDURE — 3700000000 HC ANESTHESIA ATTENDED CARE: Performed by: SURGERY

## 2021-01-01 PROCEDURE — 36592 COLLECT BLOOD FROM PICC: CPT

## 2021-01-01 PROCEDURE — APPNB30 APP NON BILLABLE TIME 0-30 MINS: Performed by: NURSE PRACTITIONER

## 2021-01-01 PROCEDURE — 75710 ARTERY X-RAYS ARM/LEG: CPT

## 2021-01-01 PROCEDURE — 96361 HYDRATE IV INFUSION ADD-ON: CPT

## 2021-01-01 PROCEDURE — 96375 TX/PRO/DX INJ NEW DRUG ADDON: CPT

## 2021-01-01 PROCEDURE — 6370000000 HC RX 637 (ALT 250 FOR IP): Performed by: NURSE PRACTITIONER

## 2021-01-01 PROCEDURE — 92610 EVALUATE SWALLOWING FUNCTION: CPT

## 2021-01-01 PROCEDURE — 7100000001 HC PACU RECOVERY - ADDTL 15 MIN: Performed by: SURGERY

## 2021-01-01 PROCEDURE — 97166 OT EVAL MOD COMPLEX 45 MIN: CPT

## 2021-01-01 PROCEDURE — 97530 THERAPEUTIC ACTIVITIES: CPT

## 2021-01-01 PROCEDURE — 84145 PROCALCITONIN (PCT): CPT

## 2021-01-01 PROCEDURE — 6360000002 HC RX W HCPCS: Performed by: NURSE ANESTHETIST, CERTIFIED REGISTERED

## 2021-01-01 PROCEDURE — 83036 HEMOGLOBIN GLYCOSYLATED A1C: CPT

## 2021-01-01 PROCEDURE — 85027 COMPLETE CBC AUTOMATED: CPT

## 2021-01-01 PROCEDURE — 99223 1ST HOSP IP/OBS HIGH 75: CPT | Performed by: SURGERY

## 2021-01-01 PROCEDURE — 78306 BONE IMAGING WHOLE BODY: CPT

## 2021-01-01 PROCEDURE — 93228 REMOTE 30 DAY ECG REV/REPORT: CPT | Performed by: INTERNAL MEDICINE

## 2021-01-01 PROCEDURE — 2000000000 HC ICU R&B

## 2021-01-01 PROCEDURE — 36247 INS CATH ABD/L-EXT ART 3RD: CPT | Performed by: SURGERY

## 2021-01-01 PROCEDURE — 94761 N-INVAS EAR/PLS OXIMETRY MLT: CPT

## 2021-01-01 PROCEDURE — 82570 ASSAY OF URINE CREATININE: CPT

## 2021-01-01 PROCEDURE — 99152 MOD SED SAME PHYS/QHP 5/>YRS: CPT | Performed by: SURGERY

## 2021-01-01 PROCEDURE — 99283 EMERGENCY DEPT VISIT LOW MDM: CPT

## 2021-01-01 PROCEDURE — 86901 BLOOD TYPING SEROLOGIC RH(D): CPT

## 2021-01-01 PROCEDURE — 86850 RBC ANTIBODY SCREEN: CPT

## 2021-01-01 PROCEDURE — 2580000003 HC RX 258: Performed by: EMERGENCY MEDICINE

## 2021-01-01 PROCEDURE — 75635 CT ANGIO ABDOMINAL ARTERIES: CPT

## 2021-01-01 PROCEDURE — 99222 1ST HOSP IP/OBS MODERATE 55: CPT | Performed by: INTERNAL MEDICINE

## 2021-01-01 PROCEDURE — 84238 ASSAY NONENDOCRINE RECEPTOR: CPT

## 2021-01-01 PROCEDURE — 83615 LACTATE (LD) (LDH) ENZYME: CPT

## 2021-01-01 PROCEDURE — 72158 MRI LUMBAR SPINE W/O & W/DYE: CPT

## 2021-01-01 PROCEDURE — 85652 RBC SED RATE AUTOMATED: CPT

## 2021-01-01 PROCEDURE — 75710 ARTERY X-RAYS ARM/LEG: CPT | Performed by: SURGERY

## 2021-01-01 PROCEDURE — 87086 URINE CULTURE/COLONY COUNT: CPT

## 2021-01-01 PROCEDURE — 80069 RENAL FUNCTION PANEL: CPT

## 2021-01-01 PROCEDURE — 83540 ASSAY OF IRON: CPT

## 2021-01-01 PROCEDURE — 7100000010 HC PHASE II RECOVERY - FIRST 15 MIN: Performed by: SURGERY

## 2021-01-01 PROCEDURE — 2500000003 HC RX 250 WO HCPCS: Performed by: STUDENT IN AN ORGANIZED HEALTH CARE EDUCATION/TRAINING PROGRAM

## 2021-01-01 PROCEDURE — 6360000002 HC RX W HCPCS: Performed by: RADIOLOGY

## 2021-01-01 PROCEDURE — 73590 X-RAY EXAM OF LOWER LEG: CPT

## 2021-01-01 PROCEDURE — 36600 WITHDRAWAL OF ARTERIAL BLOOD: CPT

## 2021-01-01 PROCEDURE — 6360000002 HC RX W HCPCS: Performed by: PHYSICIAN ASSISTANT

## 2021-01-01 PROCEDURE — 99153 MOD SED SAME PHYS/QHP EA: CPT

## 2021-01-01 PROCEDURE — P9016 RBC LEUKOCYTES REDUCED: HCPCS

## 2021-01-01 PROCEDURE — 83605 ASSAY OF LACTIC ACID: CPT

## 2021-01-01 PROCEDURE — 84550 ASSAY OF BLOOD/URIC ACID: CPT

## 2021-01-01 PROCEDURE — 84300 ASSAY OF URINE SODIUM: CPT

## 2021-01-01 PROCEDURE — 93005 ELECTROCARDIOGRAM TRACING: CPT | Performed by: PHYSICIAN ASSISTANT

## 2021-01-01 PROCEDURE — 3600000002 HC SURGERY LEVEL 2 BASE: Performed by: SURGERY

## 2021-01-01 PROCEDURE — 75625 CONTRAST EXAM ABDOMINL AORTA: CPT | Performed by: SURGERY

## 2021-01-01 PROCEDURE — 94760 N-INVAS EAR/PLS OXIMETRY 1: CPT

## 2021-01-01 PROCEDURE — 96365 THER/PROPH/DIAG IV INF INIT: CPT

## 2021-01-01 PROCEDURE — 99212 OFFICE O/P EST SF 10 MIN: CPT | Performed by: SURGERY

## 2021-01-01 PROCEDURE — 6360000002 HC RX W HCPCS: Performed by: EMERGENCY MEDICINE

## 2021-01-01 PROCEDURE — 6360000002 HC RX W HCPCS

## 2021-01-01 PROCEDURE — 93005 ELECTROCARDIOGRAM TRACING: CPT | Performed by: FAMILY MEDICINE

## 2021-01-01 PROCEDURE — 93005 ELECTROCARDIOGRAM TRACING: CPT | Performed by: EMERGENCY MEDICINE

## 2021-01-01 PROCEDURE — 82150 ASSAY OF AMYLASE: CPT

## 2021-01-01 PROCEDURE — 86880 COOMBS TEST DIRECT: CPT

## 2021-01-01 PROCEDURE — 99211 OFF/OP EST MAY X REQ PHY/QHP: CPT

## 2021-01-01 PROCEDURE — 87324 CLOSTRIDIUM AG IA: CPT

## 2021-01-01 PROCEDURE — 96376 TX/PRO/DX INJ SAME DRUG ADON: CPT

## 2021-01-01 PROCEDURE — 77001 FLUOROGUIDE FOR VEIN DEVICE: CPT

## 2021-01-01 PROCEDURE — 78452 HT MUSCLE IMAGE SPECT MULT: CPT | Performed by: INTERNAL MEDICINE

## 2021-01-01 PROCEDURE — C1887 CATHETER, GUIDING: HCPCS

## 2021-01-01 PROCEDURE — 83880 ASSAY OF NATRIURETIC PEPTIDE: CPT

## 2021-01-01 PROCEDURE — 2500000003 HC RX 250 WO HCPCS: Performed by: EMERGENCY MEDICINE

## 2021-01-01 PROCEDURE — A9577 INJ MULTIHANCE: HCPCS | Performed by: INTERNAL MEDICINE

## 2021-01-01 PROCEDURE — 82607 VITAMIN B-12: CPT

## 2021-01-01 PROCEDURE — 84156 ASSAY OF PROTEIN URINE: CPT

## 2021-01-01 PROCEDURE — 2500000003 HC RX 250 WO HCPCS: Performed by: SURGERY

## 2021-01-01 PROCEDURE — 6360000004 HC RX CONTRAST MEDICATION: Performed by: PHYSICIAN ASSISTANT

## 2021-01-01 PROCEDURE — 81001 URINALYSIS AUTO W/SCOPE: CPT

## 2021-01-01 PROCEDURE — U0005 INFEC AGEN DETEC AMPLI PROBE: HCPCS

## 2021-01-01 PROCEDURE — 93010 ELECTROCARDIOGRAM REPORT: CPT | Performed by: INTERNAL MEDICINE

## 2021-01-01 PROCEDURE — 07DR3ZX EXTRACTION OF ILIAC BONE MARROW, PERCUTANEOUS APPROACH, DIAGNOSTIC: ICD-10-PCS | Performed by: HOSPITALIST

## 2021-01-01 PROCEDURE — 83690 ASSAY OF LIPASE: CPT

## 2021-01-01 PROCEDURE — 6370000000 HC RX 637 (ALT 250 FOR IP): Performed by: PHYSICIAN ASSISTANT

## 2021-01-01 PROCEDURE — U0003 INFECTIOUS AGENT DETECTION BY NUCLEIC ACID (DNA OR RNA); SEVERE ACUTE RESPIRATORY SYNDROME CORONAVIRUS 2 (SARS-COV-2) (CORONAVIRUS DISEASE [COVID-19]), AMPLIFIED PROBE TECHNIQUE, MAKING USE OF HIGH THROUGHPUT TECHNOLOGIES AS DESCRIBED BY CMS-2020-01-R: HCPCS

## 2021-01-01 PROCEDURE — 96374 THER/PROPH/DIAG INJ IV PUSH: CPT

## 2021-01-01 PROCEDURE — 97162 PT EVAL MOD COMPLEX 30 MIN: CPT

## 2021-01-01 PROCEDURE — 85045 AUTOMATED RETICULOCYTE COUNT: CPT

## 2021-01-01 PROCEDURE — 80061 LIPID PANEL: CPT

## 2021-01-01 PROCEDURE — 3600000012 HC SURGERY LEVEL 2 ADDTL 15MIN: Performed by: SURGERY

## 2021-01-01 PROCEDURE — 77001 FLUOROGUIDE FOR VEIN DEVICE: CPT | Performed by: SURGERY

## 2021-01-01 PROCEDURE — 85384 FIBRINOGEN ACTIVITY: CPT

## 2021-01-01 PROCEDURE — 84439 ASSAY OF FREE THYROXINE: CPT

## 2021-01-01 PROCEDURE — 70553 MRI BRAIN STEM W/O & W/DYE: CPT

## 2021-01-01 PROCEDURE — 71045 X-RAY EXAM CHEST 1 VIEW: CPT

## 2021-01-01 PROCEDURE — 2709999900 HC NON-CHARGEABLE SUPPLY: Performed by: SURGERY

## 2021-01-01 PROCEDURE — 83935 ASSAY OF URINE OSMOLALITY: CPT

## 2021-01-01 PROCEDURE — 82728 ASSAY OF FERRITIN: CPT

## 2021-01-01 PROCEDURE — 51798 US URINE CAPACITY MEASURE: CPT

## 2021-01-01 PROCEDURE — 99285 EMERGENCY DEPT VISIT HI MDM: CPT

## 2021-01-01 PROCEDURE — 36561 INSERT TUNNELED CV CATH: CPT | Performed by: SURGERY

## 2021-01-01 PROCEDURE — 6360000004 HC RX CONTRAST MEDICATION: Performed by: SURGERY

## 2021-01-01 PROCEDURE — 2700000000 HC OXYGEN THERAPY PER DAY

## 2021-01-01 PROCEDURE — 88307 TISSUE EXAM BY PATHOLOGIST: CPT

## 2021-01-01 PROCEDURE — 75625 CONTRAST EXAM ABDOMINL AORTA: CPT

## 2021-01-01 PROCEDURE — 88311 DECALCIFY TISSUE: CPT

## 2021-01-01 PROCEDURE — 96367 TX/PROPH/DG ADDL SEQ IV INF: CPT

## 2021-01-01 PROCEDURE — 93971 EXTREMITY STUDY: CPT

## 2021-01-01 PROCEDURE — 82306 VITAMIN D 25 HYDROXY: CPT

## 2021-01-01 PROCEDURE — 87040 BLOOD CULTURE FOR BACTERIA: CPT

## 2021-01-01 PROCEDURE — 2500000003 HC RX 250 WO HCPCS

## 2021-01-01 PROCEDURE — P9047 ALBUMIN (HUMAN), 25%, 50ML: HCPCS | Performed by: INTERNAL MEDICINE

## 2021-01-01 PROCEDURE — 6360000004 HC RX CONTRAST MEDICATION: Performed by: EMERGENCY MEDICINE

## 2021-01-01 PROCEDURE — A9503 TC99M MEDRONATE: HCPCS | Performed by: INTERNAL MEDICINE

## 2021-01-01 PROCEDURE — 83970 ASSAY OF PARATHORMONE: CPT

## 2021-01-01 PROCEDURE — C1769 GUIDE WIRE: HCPCS

## 2021-01-01 PROCEDURE — 93017 CV STRESS TEST TRACING ONLY: CPT | Performed by: INTERNAL MEDICINE

## 2021-01-01 PROCEDURE — 7100000011 HC PHASE II RECOVERY - ADDTL 15 MIN: Performed by: SURGERY

## 2021-01-01 PROCEDURE — 86900 BLOOD TYPING SEROLOGIC ABO: CPT

## 2021-01-01 PROCEDURE — 80076 HEPATIC FUNCTION PANEL: CPT

## 2021-01-01 PROCEDURE — A9502 TC99M TETROFOSMIN: HCPCS | Performed by: INTERNAL MEDICINE

## 2021-01-01 PROCEDURE — 84132 ASSAY OF SERUM POTASSIUM: CPT

## 2021-01-01 PROCEDURE — 86923 COMPATIBILITY TEST ELECTRIC: CPT

## 2021-01-01 PROCEDURE — 7100000000 HC PACU RECOVERY - FIRST 15 MIN: Performed by: SURGERY

## 2021-01-01 DEVICE — PORT IMPL INFUSION 16X26 MM PWR INJ POLYURETHANE CATH XCELA: Type: IMPLANTABLE DEVICE | Site: SUBCLAVIAN | Status: FUNCTIONAL

## 2021-01-01 RX ORDER — MIDAZOLAM HYDROCHLORIDE 1 MG/ML
INJECTION INTRAMUSCULAR; INTRAVENOUS
Status: COMPLETED | OUTPATIENT
Start: 2021-01-01 | End: 2021-01-01

## 2021-01-01 RX ORDER — MORPHINE SULFATE 4 MG/ML
4 INJECTION, SOLUTION INTRAMUSCULAR; INTRAVENOUS EVERY 30 MIN PRN
Status: COMPLETED | OUTPATIENT
Start: 2021-01-01 | End: 2021-01-01

## 2021-01-01 RX ORDER — DEXAMETHASONE 2 MG/1
1 TABLET ORAL DAILY
Status: ON HOLD | COMMUNITY
Start: 2021-01-01 | End: 2021-01-01 | Stop reason: HOSPADM

## 2021-01-01 RX ORDER — PROPOFOL 10 MG/ML
INJECTION, EMULSION INTRAVENOUS CONTINUOUS PRN
Status: DISCONTINUED | OUTPATIENT
Start: 2021-01-01 | End: 2021-01-01 | Stop reason: SDUPTHER

## 2021-01-01 RX ORDER — HYDROMORPHONE HCL 110MG/55ML
0.5 PATIENT CONTROLLED ANALGESIA SYRINGE INTRAVENOUS EVERY 5 MIN PRN
Status: DISCONTINUED | OUTPATIENT
Start: 2021-01-01 | End: 2021-01-01 | Stop reason: HOSPADM

## 2021-01-01 RX ORDER — SODIUM CHLORIDE, SODIUM LACTATE, POTASSIUM CHLORIDE, CALCIUM CHLORIDE 600; 310; 30; 20 MG/100ML; MG/100ML; MG/100ML; MG/100ML
30 INJECTION, SOLUTION INTRAVENOUS ONCE
Status: COMPLETED | OUTPATIENT
Start: 2021-01-01 | End: 2021-01-01

## 2021-01-01 RX ORDER — SODIUM CHLORIDE 0.9 % (FLUSH) 0.9 %
5-40 SYRINGE (ML) INJECTION PRN
Status: DISCONTINUED | OUTPATIENT
Start: 2021-01-01 | End: 2021-01-01 | Stop reason: HOSPADM

## 2021-01-01 RX ORDER — HEPARIN SODIUM 1000 [USP'U]/ML
40 INJECTION, SOLUTION INTRAVENOUS; SUBCUTANEOUS PRN
Status: DISCONTINUED | OUTPATIENT
Start: 2021-01-01 | End: 2021-01-01 | Stop reason: HOSPADM

## 2021-01-01 RX ORDER — ISOSORBIDE MONONITRATE 30 MG/1
30 TABLET, EXTENDED RELEASE ORAL DAILY
Status: ON HOLD | COMMUNITY
End: 2021-01-01 | Stop reason: HOSPADM

## 2021-01-01 RX ORDER — HYDROMORPHONE HYDROCHLORIDE 1 MG/ML
1 INJECTION, SOLUTION INTRAMUSCULAR; INTRAVENOUS; SUBCUTANEOUS ONCE
Status: COMPLETED | OUTPATIENT
Start: 2021-01-01 | End: 2021-01-01

## 2021-01-01 RX ORDER — SODIUM CHLORIDE 9 MG/ML
INJECTION, SOLUTION INTRAVENOUS CONTINUOUS
Status: DISCONTINUED | OUTPATIENT
Start: 2021-01-01 | End: 2021-01-01

## 2021-01-01 RX ORDER — MEGESTROL ACETATE 40 MG/1
40 TABLET ORAL DAILY
COMMUNITY

## 2021-01-01 RX ORDER — SODIUM CHLORIDE 9 MG/ML
25 INJECTION, SOLUTION INTRAVENOUS PRN
Status: DISCONTINUED | OUTPATIENT
Start: 2021-01-01 | End: 2021-01-01 | Stop reason: HOSPADM

## 2021-01-01 RX ORDER — POLYETHYLENE GLYCOL 3350 17 G/17G
17 POWDER, FOR SOLUTION ORAL 2 TIMES DAILY
Status: DISCONTINUED | OUTPATIENT
Start: 2021-01-01 | End: 2021-01-01 | Stop reason: HOSPADM

## 2021-01-01 RX ORDER — HYDROCODONE BITARTRATE AND ACETAMINOPHEN 5; 325 MG/1; MG/1
1 TABLET ORAL EVERY 6 HOURS PRN
Qty: 10 TABLET | Refills: 0 | Status: ON HOLD | OUTPATIENT
Start: 2021-01-01 | End: 2021-01-01

## 2021-01-01 RX ORDER — POTASSIUM CHLORIDE 20 MEQ/1
40 TABLET, EXTENDED RELEASE ORAL ONCE
Status: COMPLETED | OUTPATIENT
Start: 2021-01-01 | End: 2021-01-01

## 2021-01-01 RX ORDER — MAGNESIUM SULFATE IN WATER 40 MG/ML
2000 INJECTION, SOLUTION INTRAVENOUS ONCE
Status: COMPLETED | OUTPATIENT
Start: 2021-01-01 | End: 2021-01-01

## 2021-01-01 RX ORDER — ONDANSETRON 2 MG/ML
4 INJECTION INTRAMUSCULAR; INTRAVENOUS
Status: DISCONTINUED | OUTPATIENT
Start: 2021-01-01 | End: 2021-01-01 | Stop reason: HOSPADM

## 2021-01-01 RX ORDER — IPRATROPIUM BROMIDE AND ALBUTEROL SULFATE 2.5; .5 MG/3ML; MG/3ML
1 SOLUTION RESPIRATORY (INHALATION) EVERY 4 HOURS PRN
Status: DISCONTINUED | OUTPATIENT
Start: 2021-01-01 | End: 2021-01-01 | Stop reason: HOSPADM

## 2021-01-01 RX ORDER — SODIUM CHLORIDE 0.9 % (FLUSH) 0.9 %
5-40 SYRINGE (ML) INJECTION ONCE
Status: COMPLETED | OUTPATIENT
Start: 2021-01-01 | End: 2021-01-01

## 2021-01-01 RX ORDER — ALBUTEROL SULFATE 90 UG/1
2 AEROSOL, METERED RESPIRATORY (INHALATION) 4 TIMES DAILY
Status: DISCONTINUED | OUTPATIENT
Start: 2021-01-01 | End: 2021-01-01

## 2021-01-01 RX ORDER — LEVOFLOXACIN 5 MG/ML
500 INJECTION, SOLUTION INTRAVENOUS EVERY 24 HOURS
Status: DISCONTINUED | OUTPATIENT
Start: 2021-01-01 | End: 2021-01-01

## 2021-01-01 RX ORDER — SODIUM CHLORIDE, SODIUM LACTATE, POTASSIUM CHLORIDE, CALCIUM CHLORIDE 600; 310; 30; 20 MG/100ML; MG/100ML; MG/100ML; MG/100ML
INJECTION, SOLUTION INTRAVENOUS CONTINUOUS
Status: DISCONTINUED | OUTPATIENT
Start: 2021-01-01 | End: 2021-01-01

## 2021-01-01 RX ORDER — MORPHINE SULFATE 2 MG/ML
2 INJECTION, SOLUTION INTRAMUSCULAR; INTRAVENOUS
Status: DISCONTINUED | OUTPATIENT
Start: 2021-01-01 | End: 2021-01-01 | Stop reason: HOSPADM

## 2021-01-01 RX ORDER — ALBUTEROL SULFATE 90 UG/1
2 AEROSOL, METERED RESPIRATORY (INHALATION) EVERY 6 HOURS PRN
Status: DISCONTINUED | OUTPATIENT
Start: 2021-01-01 | End: 2021-01-01

## 2021-01-01 RX ORDER — CEFUROXIME AXETIL 500 MG/1
500 TABLET ORAL DAILY
Qty: 7 TABLET | Refills: 0 | Status: SHIPPED | OUTPATIENT
Start: 2021-01-01 | End: 2021-12-22

## 2021-01-01 RX ORDER — NALOXONE HYDROCHLORIDE 4 MG/.1ML
1 SPRAY NASAL PRN
Qty: 1 EACH | Refills: 5 | Status: SHIPPED | OUTPATIENT
Start: 2021-01-01

## 2021-01-01 RX ORDER — ISOSORBIDE MONONITRATE 30 MG/1
30 TABLET, EXTENDED RELEASE ORAL DAILY
Status: DISCONTINUED | OUTPATIENT
Start: 2021-01-01 | End: 2021-01-01 | Stop reason: HOSPADM

## 2021-01-01 RX ORDER — POTASSIUM CHLORIDE 29.8 MG/ML
20 INJECTION INTRAVENOUS
Status: COMPLETED | OUTPATIENT
Start: 2021-01-01 | End: 2021-01-01

## 2021-01-01 RX ORDER — LORAZEPAM 2 MG/ML
1 CONCENTRATE ORAL
Status: DISCONTINUED | OUTPATIENT
Start: 2021-01-01 | End: 2021-01-01

## 2021-01-01 RX ORDER — NOREPINEPHRINE BIT/0.9 % NACL 16MG/250ML
2-100 INFUSION BOTTLE (ML) INTRAVENOUS CONTINUOUS
Status: DISCONTINUED | OUTPATIENT
Start: 2021-01-01 | End: 2021-01-01

## 2021-01-01 RX ORDER — PREDNISONE 20 MG/1
20 TABLET ORAL DAILY
Status: DISCONTINUED | OUTPATIENT
Start: 2021-01-01 | End: 2021-01-01 | Stop reason: HOSPADM

## 2021-01-01 RX ORDER — ATORVASTATIN CALCIUM 40 MG/1
40 TABLET, FILM COATED ORAL NIGHTLY
Qty: 30 TABLET | Refills: 3 | Status: SHIPPED | OUTPATIENT
Start: 2021-01-01

## 2021-01-01 RX ORDER — MORPHINE SULFATE 20 MG/ML
5 SOLUTION ORAL EVERY 4 HOURS PRN
Qty: 15 ML | Refills: 0 | Status: SHIPPED | OUTPATIENT
Start: 2021-01-01 | End: 2021-12-29

## 2021-01-01 RX ORDER — ASPIRIN 81 MG/1
81 TABLET ORAL DAILY
Status: DISCONTINUED | OUTPATIENT
Start: 2021-01-01 | End: 2021-01-01 | Stop reason: HOSPADM

## 2021-01-01 RX ORDER — OXYCODONE HYDROCHLORIDE 5 MG/1
5 TABLET ORAL EVERY 8 HOURS PRN
COMMUNITY
Start: 2021-01-01 | End: 2021-01-01

## 2021-01-01 RX ORDER — SODIUM CHLORIDE 0.9 % (FLUSH) 0.9 %
5-40 SYRINGE (ML) INJECTION EVERY 12 HOURS SCHEDULED
Status: DISCONTINUED | OUTPATIENT
Start: 2021-01-01 | End: 2021-01-01 | Stop reason: HOSPADM

## 2021-01-01 RX ORDER — LABETALOL HYDROCHLORIDE 5 MG/ML
5 INJECTION, SOLUTION INTRAVENOUS EVERY 10 MIN PRN
Status: DISCONTINUED | OUTPATIENT
Start: 2021-01-01 | End: 2021-01-01 | Stop reason: HOSPADM

## 2021-01-01 RX ORDER — SODIUM CHLORIDE 9 MG/ML
INJECTION, SOLUTION INTRAVENOUS PRN
Status: COMPLETED | OUTPATIENT
Start: 2021-01-01 | End: 2021-01-01

## 2021-01-01 RX ORDER — IBUPROFEN 400 MG/1
800 TABLET ORAL ONCE
Status: COMPLETED | OUTPATIENT
Start: 2021-01-01 | End: 2021-01-01

## 2021-01-01 RX ORDER — ACETAMINOPHEN 325 MG/1
650 TABLET ORAL EVERY 4 HOURS PRN
Status: DISCONTINUED | OUTPATIENT
Start: 2021-01-01 | End: 2021-01-01 | Stop reason: SDUPTHER

## 2021-01-01 RX ORDER — IPRATROPIUM BROMIDE AND ALBUTEROL SULFATE 2.5; .5 MG/3ML; MG/3ML
1 SOLUTION RESPIRATORY (INHALATION) 4 TIMES DAILY
Status: DISCONTINUED | OUTPATIENT
Start: 2021-01-01 | End: 2021-01-01

## 2021-01-01 RX ORDER — DIPHENOXYLATE HCL/ATROPINE 2.5-.025/5
10 LIQUID (ML) ORAL 4 TIMES DAILY PRN
Status: DISCONTINUED | OUTPATIENT
Start: 2021-01-01 | End: 2021-01-01 | Stop reason: SDUPTHER

## 2021-01-01 RX ORDER — LIDOCAINE HYDROCHLORIDE 10 MG/ML
1 INJECTION, SOLUTION EPIDURAL; INFILTRATION; INTRACAUDAL; PERINEURAL
Status: DISCONTINUED | OUTPATIENT
Start: 2021-01-01 | End: 2021-01-01 | Stop reason: HOSPADM

## 2021-01-01 RX ORDER — POTASSIUM CHLORIDE 7.45 MG/ML
10 INJECTION INTRAVENOUS
Status: DISPENSED | OUTPATIENT
Start: 2021-01-01 | End: 2021-01-01

## 2021-01-01 RX ORDER — HYDROCODONE BITARTRATE AND ACETAMINOPHEN 5; 325 MG/1; MG/1
1 TABLET ORAL EVERY 6 HOURS PRN
Qty: 10 TABLET | Refills: 0 | Status: SHIPPED | OUTPATIENT
Start: 2021-01-01 | End: 2021-01-01 | Stop reason: HOSPADM

## 2021-01-01 RX ORDER — IPRATROPIUM BROMIDE AND ALBUTEROL SULFATE 2.5; .5 MG/3ML; MG/3ML
1 SOLUTION RESPIRATORY (INHALATION)
Status: DISCONTINUED | OUTPATIENT
Start: 2021-01-01 | End: 2021-01-01

## 2021-01-01 RX ORDER — HEPARIN SODIUM 1000 [USP'U]/ML
80 INJECTION, SOLUTION INTRAVENOUS; SUBCUTANEOUS PRN
Status: DISCONTINUED | OUTPATIENT
Start: 2021-01-01 | End: 2021-01-01 | Stop reason: HOSPADM

## 2021-01-01 RX ORDER — LORAZEPAM 1 MG/1
1 TABLET ORAL EVERY 8 HOURS PRN
Qty: 10 TABLET | Refills: 0 | Status: SHIPPED | OUTPATIENT
Start: 2021-01-01 | End: 2021-12-22

## 2021-01-01 RX ORDER — HYDROCODONE BITARTRATE AND ACETAMINOPHEN 5; 325 MG/1; MG/1
1 TABLET ORAL EVERY 6 HOURS PRN
Status: DISCONTINUED | OUTPATIENT
Start: 2021-01-01 | End: 2021-01-01 | Stop reason: HOSPADM

## 2021-01-01 RX ORDER — FENTANYL CITRATE 50 UG/ML
INJECTION, SOLUTION INTRAMUSCULAR; INTRAVENOUS PRN
Status: DISCONTINUED | OUTPATIENT
Start: 2021-01-01 | End: 2021-01-01 | Stop reason: SDUPTHER

## 2021-01-01 RX ORDER — OXYCODONE HCL 20 MG/ML
10 CONCENTRATE, ORAL ORAL EVERY 6 HOURS PRN
Qty: 15 ML | Refills: 0 | Status: SHIPPED | OUTPATIENT
Start: 2021-01-01 | End: 2021-01-01 | Stop reason: HOSPADM

## 2021-01-01 RX ORDER — POTASSIUM CHLORIDE 20 MEQ/1
20 TABLET, EXTENDED RELEASE ORAL ONCE
Status: COMPLETED | OUTPATIENT
Start: 2021-01-01 | End: 2021-01-01

## 2021-01-01 RX ORDER — ACETAMINOPHEN 325 MG/1
650 TABLET ORAL EVERY 6 HOURS PRN
Status: DISCONTINUED | OUTPATIENT
Start: 2021-01-01 | End: 2021-01-01 | Stop reason: HOSPADM

## 2021-01-01 RX ORDER — SODIUM CHLORIDE 0.9 % (FLUSH) 0.9 %
10 SYRINGE (ML) INJECTION PRN
Status: DISCONTINUED | OUTPATIENT
Start: 2021-01-01 | End: 2021-01-01 | Stop reason: HOSPADM

## 2021-01-01 RX ORDER — MORPHINE SULFATE 2 MG/ML
2 INJECTION, SOLUTION INTRAMUSCULAR; INTRAVENOUS EVERY 4 HOURS PRN
Status: DISCONTINUED | OUTPATIENT
Start: 2021-01-01 | End: 2021-01-01

## 2021-01-01 RX ORDER — LIDOCAINE HYDROCHLORIDE 20 MG/ML
INJECTION, SOLUTION EPIDURAL; INFILTRATION; INTRACAUDAL; PERINEURAL PRN
Status: DISCONTINUED | OUTPATIENT
Start: 2021-01-01 | End: 2021-01-01 | Stop reason: SDUPTHER

## 2021-01-01 RX ORDER — OXYCODONE HYDROCHLORIDE AND ACETAMINOPHEN 5; 325 MG/1; MG/1
1 TABLET ORAL
Status: DISCONTINUED | OUTPATIENT
Start: 2021-01-01 | End: 2021-01-01 | Stop reason: HOSPADM

## 2021-01-01 RX ORDER — TC 99M MEDRONATE 20 MG/10ML
26.99 INJECTION, POWDER, LYOPHILIZED, FOR SOLUTION INTRAVENOUS
Status: COMPLETED | OUTPATIENT
Start: 2021-01-01 | End: 2021-01-01

## 2021-01-01 RX ORDER — MORPHINE SULFATE 15 MG/1
15 TABLET, FILM COATED, EXTENDED RELEASE ORAL EVERY 12 HOURS SCHEDULED
Status: DISCONTINUED | OUTPATIENT
Start: 2021-01-01 | End: 2021-01-01 | Stop reason: HOSPADM

## 2021-01-01 RX ORDER — CALCIUM CARBONATE 200(500)MG
500 TABLET,CHEWABLE ORAL 2 TIMES DAILY
Status: DISCONTINUED | OUTPATIENT
Start: 2021-01-01 | End: 2021-01-01 | Stop reason: HOSPADM

## 2021-01-01 RX ORDER — LOPERAMIDE HCL 1 MG/7.5ML
4 SOLUTION ORAL
Status: DISCONTINUED | OUTPATIENT
Start: 2021-01-01 | End: 2021-01-01 | Stop reason: ALTCHOICE

## 2021-01-01 RX ORDER — FENTANYL CITRATE 50 UG/ML
INJECTION, SOLUTION INTRAMUSCULAR; INTRAVENOUS
Status: COMPLETED | OUTPATIENT
Start: 2021-01-01 | End: 2021-01-01

## 2021-01-01 RX ORDER — HEPARIN SODIUM 1000 [USP'U]/ML
4000 INJECTION, SOLUTION INTRAVENOUS; SUBCUTANEOUS PRN
Status: DISCONTINUED | OUTPATIENT
Start: 2021-01-01 | End: 2021-01-01

## 2021-01-01 RX ORDER — ACETAMINOPHEN 650 MG/1
650 SUPPOSITORY RECTAL EVERY 6 HOURS PRN
Status: DISCONTINUED | OUTPATIENT
Start: 2021-01-01 | End: 2021-01-01 | Stop reason: HOSPADM

## 2021-01-01 RX ORDER — MELOXICAM 7.5 MG/1
15 TABLET ORAL DAILY
Status: DISCONTINUED | OUTPATIENT
Start: 2021-01-01 | End: 2021-01-01

## 2021-01-01 RX ORDER — MORPHINE SULFATE 15 MG/1
15 TABLET, FILM COATED, EXTENDED RELEASE ORAL EVERY 12 HOURS
Status: DISCONTINUED | OUTPATIENT
Start: 2021-01-01 | End: 2021-01-01

## 2021-01-01 RX ORDER — LINEZOLID 2 MG/ML
600 INJECTION, SOLUTION INTRAVENOUS EVERY 12 HOURS
Status: DISCONTINUED | OUTPATIENT
Start: 2021-01-01 | End: 2021-01-01

## 2021-01-01 RX ORDER — MEGESTROL ACETATE 20 MG/1
40 TABLET ORAL DAILY
Status: DISCONTINUED | OUTPATIENT
Start: 2021-01-01 | End: 2021-01-01 | Stop reason: HOSPADM

## 2021-01-01 RX ORDER — POLYETHYLENE GLYCOL 3350 17 G/17G
17 POWDER, FOR SOLUTION ORAL DAILY PRN
Status: DISCONTINUED | OUTPATIENT
Start: 2021-01-01 | End: 2021-01-01 | Stop reason: HOSPADM

## 2021-01-01 RX ORDER — HYDROMORPHONE HYDROCHLORIDE 1 MG/ML
1 INJECTION, SOLUTION INTRAMUSCULAR; INTRAVENOUS; SUBCUTANEOUS EVERY 4 HOURS PRN
Status: DISCONTINUED | OUTPATIENT
Start: 2021-01-01 | End: 2021-01-01 | Stop reason: HOSPADM

## 2021-01-01 RX ORDER — CALCIUM GLUCONATE 20 MG/ML
1000 INJECTION, SOLUTION INTRAVENOUS ONCE
Status: COMPLETED | OUTPATIENT
Start: 2021-01-01 | End: 2021-01-01

## 2021-01-01 RX ORDER — PROPOFOL 10 MG/ML
INJECTION, EMULSION INTRAVENOUS PRN
Status: DISCONTINUED | OUTPATIENT
Start: 2021-01-01 | End: 2021-01-01 | Stop reason: SDUPTHER

## 2021-01-01 RX ORDER — DILTIAZEM HYDROCHLORIDE 5 MG/ML
10 INJECTION INTRAVENOUS ONCE
Status: COMPLETED | OUTPATIENT
Start: 2021-01-01 | End: 2021-01-01

## 2021-01-01 RX ORDER — DIPHENHYDRAMINE HCL 25 MG
25 TABLET ORAL SEE ADMIN INSTRUCTIONS
Status: COMPLETED | OUTPATIENT
Start: 2021-01-01 | End: 2021-01-01

## 2021-01-01 RX ORDER — LIDOCAINE HYDROCHLORIDE 10 MG/ML
INJECTION, SOLUTION INFILTRATION; PERINEURAL
Status: COMPLETED | OUTPATIENT
Start: 2021-01-01 | End: 2021-01-01

## 2021-01-01 RX ORDER — MORPHINE SULFATE 15 MG/1
15 TABLET, FILM COATED, EXTENDED RELEASE ORAL EVERY 12 HOURS
COMMUNITY
Start: 2021-01-01

## 2021-01-01 RX ORDER — ALBUMIN (HUMAN) 12.5 G/50ML
25 SOLUTION INTRAVENOUS ONCE
Status: COMPLETED | OUTPATIENT
Start: 2021-01-01 | End: 2021-01-01

## 2021-01-01 RX ORDER — IODIXANOL 320 MG/ML
40 INJECTION, SOLUTION INTRAVASCULAR ONCE
Status: COMPLETED | OUTPATIENT
Start: 2021-01-01 | End: 2021-01-01

## 2021-01-01 RX ORDER — SODIUM CHLORIDE, SODIUM LACTATE, POTASSIUM CHLORIDE, CALCIUM CHLORIDE 600; 310; 30; 20 MG/100ML; MG/100ML; MG/100ML; MG/100ML
INJECTION, SOLUTION INTRAVENOUS CONTINUOUS
Status: DISCONTINUED | OUTPATIENT
Start: 2021-01-01 | End: 2021-01-01 | Stop reason: HOSPADM

## 2021-01-01 RX ORDER — SODIUM CHLORIDE 9 MG/ML
INJECTION, SOLUTION INTRAVENOUS CONTINUOUS PRN
Status: DISCONTINUED | OUTPATIENT
Start: 2021-01-01 | End: 2021-01-01 | Stop reason: SDUPTHER

## 2021-01-01 RX ORDER — MEPERIDINE HYDROCHLORIDE 25 MG/ML
12.5 INJECTION INTRAMUSCULAR; INTRAVENOUS; SUBCUTANEOUS EVERY 5 MIN PRN
Status: DISCONTINUED | OUTPATIENT
Start: 2021-01-01 | End: 2021-01-01 | Stop reason: HOSPADM

## 2021-01-01 RX ORDER — HEPARIN SODIUM 10000 [USP'U]/100ML
5-30 INJECTION, SOLUTION INTRAVENOUS CONTINUOUS
Status: DISCONTINUED | OUTPATIENT
Start: 2021-01-01 | End: 2021-01-01

## 2021-01-01 RX ORDER — POTASSIUM CHLORIDE 7.45 MG/ML
10 INJECTION INTRAVENOUS
Status: COMPLETED | OUTPATIENT
Start: 2021-01-01 | End: 2021-01-01

## 2021-01-01 RX ORDER — HYDROCODONE BITARTRATE AND ACETAMINOPHEN 5; 325 MG/1; MG/1
1 TABLET ORAL EVERY 6 HOURS PRN
Qty: 20 TABLET | Refills: 0 | Status: SHIPPED | OUTPATIENT
Start: 2021-01-01 | End: 2021-01-01

## 2021-01-01 RX ORDER — ALBUTEROL SULFATE 2.5 MG/3ML
2.5 SOLUTION RESPIRATORY (INHALATION) EVERY 4 HOURS PRN
Status: DISCONTINUED | OUTPATIENT
Start: 2021-01-01 | End: 2021-01-01 | Stop reason: HOSPADM

## 2021-01-01 RX ORDER — LORAZEPAM 0.5 MG/1
0.5 TABLET ORAL
Status: DISCONTINUED | OUTPATIENT
Start: 2021-01-01 | End: 2021-01-01

## 2021-01-01 RX ORDER — MELOXICAM 15 MG/1
15 TABLET ORAL DAILY
Status: ON HOLD | COMMUNITY
Start: 2021-01-01 | End: 2021-01-01

## 2021-01-01 RX ORDER — OXYCODONE HYDROCHLORIDE 5 MG/1
5 CAPSULE ORAL EVERY 4 HOURS PRN
Status: ON HOLD | COMMUNITY
End: 2021-01-01 | Stop reason: HOSPADM

## 2021-01-01 RX ORDER — LOPERAMIDE HCL 1 MG/7.5ML
4 SOLUTION ORAL 4 TIMES DAILY PRN
Status: DISCONTINUED | OUTPATIENT
Start: 2021-01-01 | End: 2021-01-01

## 2021-01-01 RX ORDER — ONDANSETRON 2 MG/ML
4 INJECTION INTRAMUSCULAR; INTRAVENOUS ONCE
Status: COMPLETED | OUTPATIENT
Start: 2021-01-01 | End: 2021-01-01

## 2021-01-01 RX ORDER — DIPHENOXYLATE HYDROCHLORIDE AND ATROPINE SULFATE 2.5; .025 MG/1; MG/1
2 TABLET ORAL 4 TIMES DAILY PRN
Status: DISCONTINUED | OUTPATIENT
Start: 2021-01-01 | End: 2021-01-01 | Stop reason: HOSPADM

## 2021-01-01 RX ORDER — IBUPROFEN 400 MG/1
TABLET ORAL
Status: DISCONTINUED
Start: 2021-01-01 | End: 2021-01-01 | Stop reason: HOSPADM

## 2021-01-01 RX ORDER — ALBUTEROL SULFATE 90 UG/1
2 AEROSOL, METERED RESPIRATORY (INHALATION) EVERY 4 HOURS PRN
Status: DISCONTINUED | OUTPATIENT
Start: 2021-01-01 | End: 2021-01-01

## 2021-01-01 RX ORDER — NABUMETONE 500 MG/1
500 TABLET, FILM COATED ORAL 2 TIMES DAILY
COMMUNITY
End: 2021-01-01

## 2021-01-01 RX ORDER — OXYCODONE HYDROCHLORIDE 5 MG/1
5 TABLET ORAL EVERY 4 HOURS PRN
Status: DISCONTINUED | OUTPATIENT
Start: 2021-01-01 | End: 2021-01-01

## 2021-01-01 RX ORDER — SODIUM CHLORIDE 9 MG/ML
INJECTION, SOLUTION INTRAVENOUS CONTINUOUS
Status: DISCONTINUED | OUTPATIENT
Start: 2021-01-01 | End: 2021-01-01 | Stop reason: HOSPADM

## 2021-01-01 RX ORDER — LORAZEPAM 1 MG/1
1 TABLET ORAL
Status: DISCONTINUED | OUTPATIENT
Start: 2021-01-01 | End: 2021-01-01 | Stop reason: HOSPADM

## 2021-01-01 RX ORDER — ACETAMINOPHEN 325 MG/1
650 TABLET ORAL SEE ADMIN INSTRUCTIONS
Status: COMPLETED | OUTPATIENT
Start: 2021-01-01 | End: 2021-01-01

## 2021-01-01 RX ORDER — HEPARIN SODIUM 10000 [USP'U]/100ML
5-30 INJECTION, SOLUTION INTRAVENOUS CONTINUOUS
Status: DISPENSED | OUTPATIENT
Start: 2021-01-01 | End: 2021-01-01

## 2021-01-01 RX ORDER — LORAZEPAM 2 MG/ML
1 INJECTION INTRAMUSCULAR ONCE
Status: DISCONTINUED | OUTPATIENT
Start: 2021-01-01 | End: 2021-01-01 | Stop reason: HOSPADM

## 2021-01-01 RX ORDER — ASPIRIN 81 MG/1
81 TABLET ORAL DAILY
Qty: 30 TABLET | Refills: 3 | Status: SHIPPED | OUTPATIENT
Start: 2021-01-01

## 2021-01-01 RX ORDER — SODIUM CHLORIDE 0.9 % (FLUSH) 0.9 %
10 SYRINGE (ML) INJECTION EVERY 12 HOURS SCHEDULED
Status: DISCONTINUED | OUTPATIENT
Start: 2021-01-01 | End: 2021-01-01 | Stop reason: HOSPADM

## 2021-01-01 RX ORDER — OXYCODONE HCL 20 MG/ML
10 CONCENTRATE, ORAL ORAL
Status: DISCONTINUED | OUTPATIENT
Start: 2021-01-01 | End: 2021-01-01 | Stop reason: HOSPADM

## 2021-01-01 RX ORDER — ATORVASTATIN CALCIUM 80 MG/1
40 TABLET, FILM COATED ORAL NIGHTLY
Status: DISCONTINUED | OUTPATIENT
Start: 2021-01-01 | End: 2021-01-01 | Stop reason: HOSPADM

## 2021-01-01 RX ORDER — 0.9 % SODIUM CHLORIDE 0.9 %
1000 INTRAVENOUS SOLUTION INTRAVENOUS ONCE
Status: DISCONTINUED | OUTPATIENT
Start: 2021-01-01 | End: 2021-01-01

## 2021-01-01 RX ORDER — HEPARIN SODIUM 1000 [USP'U]/ML
4000 INJECTION, SOLUTION INTRAVENOUS; SUBCUTANEOUS ONCE
Status: COMPLETED | OUTPATIENT
Start: 2021-01-01 | End: 2021-01-01

## 2021-01-01 RX ORDER — ATORVASTATIN CALCIUM 40 MG/1
40 TABLET, FILM COATED ORAL NIGHTLY
Status: DISCONTINUED | OUTPATIENT
Start: 2021-01-01 | End: 2021-01-01 | Stop reason: HOSPADM

## 2021-01-01 RX ORDER — ERGOCALCIFEROL 1.25 MG/1
50000 CAPSULE ORAL WEEKLY
Status: DISCONTINUED | OUTPATIENT
Start: 2021-01-01 | End: 2021-01-01 | Stop reason: HOSPADM

## 2021-01-01 RX ORDER — LORAZEPAM 0.5 MG/1
0.5 TABLET ORAL EVERY 4 HOURS PRN
Status: DISCONTINUED | OUTPATIENT
Start: 2021-01-01 | End: 2021-01-01

## 2021-01-01 RX ORDER — ASPIRIN 81 MG/1
81 TABLET ORAL DAILY
Status: DISCONTINUED | OUTPATIENT
Start: 2021-01-01 | End: 2021-01-01

## 2021-01-01 RX ORDER — HEPARIN SODIUM 1000 [USP'U]/ML
2000 INJECTION, SOLUTION INTRAVENOUS; SUBCUTANEOUS PRN
Status: DISCONTINUED | OUTPATIENT
Start: 2021-01-01 | End: 2021-01-01

## 2021-01-01 RX ORDER — ACETAMINOPHEN 325 MG/1
650 TABLET ORAL EVERY 6 HOURS PRN
Status: DISCONTINUED | OUTPATIENT
Start: 2021-01-01 | End: 2021-01-01 | Stop reason: DRUGHIGH

## 2021-01-01 RX ORDER — ONDANSETRON 2 MG/ML
4 INJECTION INTRAMUSCULAR; INTRAVENOUS EVERY 6 HOURS PRN
Status: DISCONTINUED | OUTPATIENT
Start: 2021-01-01 | End: 2021-01-01 | Stop reason: HOSPADM

## 2021-01-01 RX ORDER — HYDRALAZINE HYDROCHLORIDE 20 MG/ML
5 INJECTION INTRAMUSCULAR; INTRAVENOUS EVERY 10 MIN PRN
Status: DISCONTINUED | OUTPATIENT
Start: 2021-01-01 | End: 2021-01-01 | Stop reason: HOSPADM

## 2021-01-01 RX ORDER — ONDANSETRON 4 MG/1
4 TABLET, ORALLY DISINTEGRATING ORAL EVERY 8 HOURS PRN
Status: DISCONTINUED | OUTPATIENT
Start: 2021-01-01 | End: 2021-01-01 | Stop reason: HOSPADM

## 2021-01-01 RX ORDER — ACETAMINOPHEN 325 MG/1
650 TABLET ORAL EVERY 4 HOURS PRN
Status: DISCONTINUED | OUTPATIENT
Start: 2021-01-01 | End: 2021-01-01 | Stop reason: HOSPADM

## 2021-01-01 RX ADMIN — FENTANYL CITRATE 25 MCG: 50 INJECTION, SOLUTION INTRAMUSCULAR; INTRAVENOUS at 09:41

## 2021-01-01 RX ADMIN — SODIUM CHLORIDE: 9 INJECTION, SOLUTION INTRAVENOUS at 08:55

## 2021-01-01 RX ADMIN — FENTANYL CITRATE 50 MCG: 50 INJECTION, SOLUTION INTRAMUSCULAR; INTRAVENOUS at 10:38

## 2021-01-01 RX ADMIN — Medication 10 ML: at 22:33

## 2021-01-01 RX ADMIN — IPRATROPIUM BROMIDE AND ALBUTEROL SULFATE 1 AMPULE: .5; 3 SOLUTION RESPIRATORY (INHALATION) at 19:51

## 2021-01-01 RX ADMIN — HEPARIN SODIUM 28.05 UNITS/KG/HR: 10000 INJECTION, SOLUTION INTRAVENOUS at 21:35

## 2021-01-01 RX ADMIN — HYDROCORTISONE SODIUM SUCCINATE 50 MG: 100 INJECTION, POWDER, FOR SOLUTION INTRAMUSCULAR; INTRAVENOUS at 12:36

## 2021-01-01 RX ADMIN — POTASSIUM CHLORIDE 10 MEQ: 10 INJECTION, SOLUTION INTRAVENOUS at 16:18

## 2021-01-01 RX ADMIN — HYDROCORTISONE SODIUM SUCCINATE 50 MG: 100 INJECTION, POWDER, FOR SOLUTION INTRAMUSCULAR; INTRAVENOUS at 06:38

## 2021-01-01 RX ADMIN — Medication 10 ML: at 09:44

## 2021-01-01 RX ADMIN — ERGOCALCIFEROL 50000 UNITS: 1.25 CAPSULE ORAL at 11:46

## 2021-01-01 RX ADMIN — SODIUM CHLORIDE: 9 INJECTION, SOLUTION INTRAVENOUS at 14:41

## 2021-01-01 RX ADMIN — LORAZEPAM 1 MG: 1 TABLET ORAL at 21:43

## 2021-01-01 RX ADMIN — HYDROMORPHONE HYDROCHLORIDE 1 MG: 1 INJECTION, SOLUTION INTRAMUSCULAR; INTRAVENOUS; SUBCUTANEOUS at 15:52

## 2021-01-01 RX ADMIN — ISOSORBIDE MONONITRATE 30 MG: 30 TABLET, EXTENDED RELEASE ORAL at 08:11

## 2021-01-01 RX ADMIN — APIXABAN 5 MG: 5 TABLET, FILM COATED ORAL at 09:03

## 2021-01-01 RX ADMIN — POTASSIUM CHLORIDE 10 MEQ: 10 INJECTION, SOLUTION INTRAVENOUS at 09:03

## 2021-01-01 RX ADMIN — HYDROCODONE BITARTRATE AND ACETAMINOPHEN 1 TABLET: 5; 325 TABLET ORAL at 00:19

## 2021-01-01 RX ADMIN — APIXABAN 5 MG: 5 TABLET, FILM COATED ORAL at 20:38

## 2021-01-01 RX ADMIN — MORPHINE SULFATE 15 MG: 15 TABLET, FILM COATED, EXTENDED RELEASE ORAL at 20:07

## 2021-01-01 RX ADMIN — PIPERACILLIN AND TAZOBACTAM 3375 MG: 3; .375 INJECTION, POWDER, LYOPHILIZED, FOR SOLUTION INTRAVENOUS at 12:16

## 2021-01-01 RX ADMIN — IPRATROPIUM BROMIDE AND ALBUTEROL SULFATE 1 AMPULE: .5; 3 SOLUTION RESPIRATORY (INHALATION) at 17:21

## 2021-01-01 RX ADMIN — IOPAMIDOL 75 ML: 755 INJECTION, SOLUTION INTRAVENOUS at 10:50

## 2021-01-01 RX ADMIN — HEPARIN SODIUM 35 UNITS/KG/HR: 10000 INJECTION, SOLUTION INTRAVENOUS at 04:17

## 2021-01-01 RX ADMIN — SODIUM CHLORIDE 25 ML: 9 INJECTION, SOLUTION INTRAVENOUS at 09:30

## 2021-01-01 RX ADMIN — SODIUM BICARBONATE: 84 INJECTION, SOLUTION INTRAVENOUS at 06:05

## 2021-01-01 RX ADMIN — POTASSIUM CHLORIDE 20 MEQ: 20 TABLET, EXTENDED RELEASE ORAL at 10:46

## 2021-01-01 RX ADMIN — SODIUM CHLORIDE, POTASSIUM CHLORIDE, SODIUM LACTATE AND CALCIUM CHLORIDE 2505 ML: 600; 310; 30; 20 INJECTION, SOLUTION INTRAVENOUS at 19:38

## 2021-01-01 RX ADMIN — LINEZOLID 600 MG: 600 INJECTION, SOLUTION INTRAVENOUS at 20:36

## 2021-01-01 RX ADMIN — POLYETHYLENE GLYCOL 3350 17 G: 17 POWDER, FOR SOLUTION ORAL at 08:08

## 2021-01-01 RX ADMIN — ATORVASTATIN CALCIUM 40 MG: 40 TABLET, FILM COATED ORAL at 20:07

## 2021-01-01 RX ADMIN — ISOSORBIDE MONONITRATE 30 MG: 30 TABLET, EXTENDED RELEASE ORAL at 08:45

## 2021-01-01 RX ADMIN — MORPHINE SULFATE 2 MG: 2 INJECTION, SOLUTION INTRAMUSCULAR; INTRAVENOUS at 04:45

## 2021-01-01 RX ADMIN — Medication 10 ML: at 21:56

## 2021-01-01 RX ADMIN — IBUPROFEN 800 MG: 400 TABLET, FILM COATED ORAL at 19:22

## 2021-01-01 RX ADMIN — HYDROCODONE BITARTRATE AND ACETAMINOPHEN 1 TABLET: 5; 325 TABLET ORAL at 11:28

## 2021-01-01 RX ADMIN — POTASSIUM CHLORIDE 10 MEQ: 10 INJECTION, SOLUTION INTRAVENOUS at 18:37

## 2021-01-01 RX ADMIN — Medication 10 ML: at 20:18

## 2021-01-01 RX ADMIN — IPRATROPIUM BROMIDE AND ALBUTEROL SULFATE 1 AMPULE: .5; 3 SOLUTION RESPIRATORY (INHALATION) at 16:22

## 2021-01-01 RX ADMIN — TETROFOSMIN 10 MILLICURIE: 1.38 INJECTION, POWDER, LYOPHILIZED, FOR SOLUTION INTRAVENOUS at 09:00

## 2021-01-01 RX ADMIN — Medication 10 ML: at 07:51

## 2021-01-01 RX ADMIN — POTASSIUM CHLORIDE 10 MEQ: 10 INJECTION, SOLUTION INTRAVENOUS at 20:23

## 2021-01-01 RX ADMIN — MEGESTROL ACETATE 40 MG: 20 TABLET ORAL at 15:15

## 2021-01-01 RX ADMIN — Medication 2 PUFF: at 09:36

## 2021-01-01 RX ADMIN — ASPIRIN 81 MG: 81 TABLET, COATED ORAL at 10:24

## 2021-01-01 RX ADMIN — DILTIAZEM HYDROCHLORIDE 10 MG: 5 INJECTION INTRAVENOUS at 19:37

## 2021-01-01 RX ADMIN — HYDROMORPHONE HYDROCHLORIDE 1 MG: 1 INJECTION, SOLUTION INTRAMUSCULAR; INTRAVENOUS; SUBCUTANEOUS at 02:15

## 2021-01-01 RX ADMIN — HEPARIN SODIUM 2000 UNITS: 1000 INJECTION INTRAVENOUS; SUBCUTANEOUS at 10:09

## 2021-01-01 RX ADMIN — Medication 10 ML: at 10:08

## 2021-01-01 RX ADMIN — HEPARIN SODIUM 31 UNITS/KG/HR: 10000 INJECTION, SOLUTION INTRAVENOUS at 03:52

## 2021-01-01 RX ADMIN — HYDROMORPHONE HYDROCHLORIDE 1 MG: 1 INJECTION, SOLUTION INTRAMUSCULAR; INTRAVENOUS; SUBCUTANEOUS at 07:58

## 2021-01-01 RX ADMIN — POTASSIUM CHLORIDE 10 MEQ: 10 INJECTION, SOLUTION INTRAVENOUS at 11:39

## 2021-01-01 RX ADMIN — OXYCODONE HYDROCHLORIDE 10 MG: 100 SOLUTION ORAL at 23:51

## 2021-01-01 RX ADMIN — Medication 10 ML: at 10:46

## 2021-01-01 RX ADMIN — POLYETHYLENE GLYCOL 3350 17 G: 17 POWDER, FOR SOLUTION ORAL at 23:33

## 2021-01-01 RX ADMIN — SODIUM BICARBONATE: 84 INJECTION, SOLUTION INTRAVENOUS at 04:35

## 2021-01-01 RX ADMIN — ATORVASTATIN CALCIUM 40 MG: 80 TABLET, FILM COATED ORAL at 21:55

## 2021-01-01 RX ADMIN — AMIODARONE HYDROCHLORIDE 0.5 MG/MIN: 50 INJECTION, SOLUTION INTRAVENOUS at 02:56

## 2021-01-01 RX ADMIN — ANTACID TABLETS 500 MG: 500 TABLET, CHEWABLE ORAL at 10:46

## 2021-01-01 RX ADMIN — ALBUMIN (HUMAN) 25 G: 0.25 INJECTION, SOLUTION INTRAVENOUS at 12:02

## 2021-01-01 RX ADMIN — MEGESTROL ACETATE 40 MG: 20 TABLET ORAL at 10:46

## 2021-01-01 RX ADMIN — Medication 10 ML: at 12:08

## 2021-01-01 RX ADMIN — AMIODARONE HYDROCHLORIDE 0.5 MG/MIN: 50 INJECTION, SOLUTION INTRAVENOUS at 12:39

## 2021-01-01 RX ADMIN — ATORVASTATIN CALCIUM 40 MG: 40 TABLET, FILM COATED ORAL at 22:24

## 2021-01-01 RX ADMIN — ATORVASTATIN CALCIUM 40 MG: 80 TABLET, FILM COATED ORAL at 20:57

## 2021-01-01 RX ADMIN — APIXABAN 5 MG: 5 TABLET, FILM COATED ORAL at 09:04

## 2021-01-01 RX ADMIN — APIXABAN 5 MG: 5 TABLET, FILM COATED ORAL at 22:24

## 2021-01-01 RX ADMIN — LORAZEPAM 0.5 MG: 0.5 TABLET ORAL at 02:55

## 2021-01-01 RX ADMIN — POTASSIUM CHLORIDE 10 MEQ: 10 INJECTION, SOLUTION INTRAVENOUS at 10:24

## 2021-01-01 RX ADMIN — ACETAMINOPHEN 650 MG: 325 TABLET ORAL at 13:03

## 2021-01-01 RX ADMIN — SODIUM CHLORIDE 25 ML: 9 INJECTION, SOLUTION INTRAVENOUS at 12:15

## 2021-01-01 RX ADMIN — ATORVASTATIN CALCIUM 40 MG: 40 TABLET, FILM COATED ORAL at 21:41

## 2021-01-01 RX ADMIN — HYDROMORPHONE HYDROCHLORIDE 1 MG: 1 INJECTION, SOLUTION INTRAMUSCULAR; INTRAVENOUS; SUBCUTANEOUS at 03:59

## 2021-01-01 RX ADMIN — SODIUM CHLORIDE: 9 INJECTION, SOLUTION INTRAVENOUS at 13:15

## 2021-01-01 RX ADMIN — POTASSIUM CHLORIDE 20 MEQ: 29.8 INJECTION, SOLUTION INTRAVENOUS at 10:39

## 2021-01-01 RX ADMIN — LIDOCAINE HYDROCHLORIDE 60 MG: 20 INJECTION, SOLUTION EPIDURAL; INFILTRATION; INTRACAUDAL; PERINEURAL at 09:31

## 2021-01-01 RX ADMIN — HYDROMORPHONE HYDROCHLORIDE 1 MG: 1 INJECTION, SOLUTION INTRAMUSCULAR; INTRAVENOUS; SUBCUTANEOUS at 12:08

## 2021-01-01 RX ADMIN — HYDROCORTISONE SODIUM SUCCINATE 50 MG: 100 INJECTION, POWDER, FOR SOLUTION INTRAMUSCULAR; INTRAVENOUS at 04:45

## 2021-01-01 RX ADMIN — ALBUTEROL SULFATE 2.5 MG: 2.5 SOLUTION RESPIRATORY (INHALATION) at 02:35

## 2021-01-01 RX ADMIN — FENTANYL CITRATE 50 MCG: 50 INJECTION, SOLUTION INTRAMUSCULAR; INTRAVENOUS at 10:41

## 2021-01-01 RX ADMIN — HYDROCORTISONE SODIUM SUCCINATE 50 MG: 100 INJECTION, POWDER, FOR SOLUTION INTRAMUSCULAR; INTRAVENOUS at 09:47

## 2021-01-01 RX ADMIN — DIPHENOXYLATE HYDROCHLORIDE AND ATROPINE SULFATE 2 TABLET: 2.5; .025 TABLET ORAL at 04:52

## 2021-01-01 RX ADMIN — MORPHINE SULFATE 4 MG: 4 INJECTION INTRAVENOUS at 22:21

## 2021-01-01 RX ADMIN — HYDROMORPHONE HYDROCHLORIDE 1 MG: 1 INJECTION, SOLUTION INTRAMUSCULAR; INTRAVENOUS; SUBCUTANEOUS at 10:19

## 2021-01-01 RX ADMIN — APIXABAN 10 MG: 5 TABLET, FILM COATED ORAL at 19:13

## 2021-01-01 RX ADMIN — LINEZOLID 600 MG: 600 INJECTION, SOLUTION INTRAVENOUS at 09:08

## 2021-01-01 RX ADMIN — ATORVASTATIN CALCIUM 40 MG: 40 TABLET, FILM COATED ORAL at 20:51

## 2021-01-01 RX ADMIN — AMIODARONE HYDROCHLORIDE 150 MG: 50 INJECTION, SOLUTION INTRAVENOUS at 23:41

## 2021-01-01 RX ADMIN — PIPERACILLIN AND TAZOBACTAM 3375 MG: 3; .375 INJECTION, POWDER, LYOPHILIZED, FOR SOLUTION INTRAVENOUS at 00:25

## 2021-01-01 RX ADMIN — DIPHENOXYLATE HYDROCHLORIDE AND ATROPINE SULFATE 2 TABLET: 2.5; .025 TABLET ORAL at 11:34

## 2021-01-01 RX ADMIN — HEPARIN SODIUM 4000 UNITS: 1000 INJECTION INTRAVENOUS; SUBCUTANEOUS at 00:50

## 2021-01-01 RX ADMIN — ANTACID TABLETS 500 MG: 500 TABLET, CHEWABLE ORAL at 09:17

## 2021-01-01 RX ADMIN — IPRATROPIUM BROMIDE AND ALBUTEROL SULFATE 1 AMPULE: .5; 3 SOLUTION RESPIRATORY (INHALATION) at 19:25

## 2021-01-01 RX ADMIN — GADOBENATE DIMEGLUMINE 15 ML: 529 INJECTION, SOLUTION INTRAVENOUS at 12:51

## 2021-01-01 RX ADMIN — MORPHINE SULFATE 15 MG: 15 TABLET, FILM COATED, EXTENDED RELEASE ORAL at 09:04

## 2021-01-01 RX ADMIN — HYDROMORPHONE HYDROCHLORIDE 1 MG: 1 INJECTION, SOLUTION INTRAMUSCULAR; INTRAVENOUS; SUBCUTANEOUS at 02:43

## 2021-01-01 RX ADMIN — MORPHINE SULFATE 2 MG: 2 INJECTION, SOLUTION INTRAMUSCULAR; INTRAVENOUS at 23:34

## 2021-01-01 RX ADMIN — SODIUM CHLORIDE: 9 INJECTION, SOLUTION INTRAVENOUS at 22:02

## 2021-01-01 RX ADMIN — Medication 2 PUFF: at 03:23

## 2021-01-01 RX ADMIN — Medication 10 ML: at 11:06

## 2021-01-01 RX ADMIN — MORPHINE SULFATE 15 MG: 15 TABLET, FILM COATED, EXTENDED RELEASE ORAL at 09:03

## 2021-01-01 RX ADMIN — MORPHINE SULFATE 15 MG: 15 TABLET, FILM COATED, EXTENDED RELEASE ORAL at 20:51

## 2021-01-01 RX ADMIN — Medication 10 ML: at 20:38

## 2021-01-01 RX ADMIN — POTASSIUM CHLORIDE 40 MEQ: 20 TABLET, EXTENDED RELEASE ORAL at 17:22

## 2021-01-01 RX ADMIN — IPRATROPIUM BROMIDE AND ALBUTEROL SULFATE 1 AMPULE: .5; 3 SOLUTION RESPIRATORY (INHALATION) at 08:11

## 2021-01-01 RX ADMIN — HEPARIN SODIUM 4000 UNITS: 1000 INJECTION INTRAVENOUS; SUBCUTANEOUS at 14:40

## 2021-01-01 RX ADMIN — HYDROCORTISONE SODIUM SUCCINATE 50 MG: 100 INJECTION, POWDER, FOR SOLUTION INTRAMUSCULAR; INTRAVENOUS at 22:24

## 2021-01-01 RX ADMIN — PIPERACILLIN AND TAZOBACTAM 3375 MG: 3; .375 INJECTION, POWDER, LYOPHILIZED, FOR SOLUTION INTRAVENOUS at 22:31

## 2021-01-01 RX ADMIN — HYDROMORPHONE HYDROCHLORIDE 1 MG: 1 INJECTION, SOLUTION INTRAMUSCULAR; INTRAVENOUS; SUBCUTANEOUS at 03:50

## 2021-01-01 RX ADMIN — SODIUM CHLORIDE: 9 INJECTION, SOLUTION INTRAVENOUS at 21:00

## 2021-01-01 RX ADMIN — PROPOFOL 50 MG: 10 INJECTION, EMULSION INTRAVENOUS at 09:31

## 2021-01-01 RX ADMIN — MELOXICAM 15 MG: 7.5 TABLET ORAL at 08:11

## 2021-01-01 RX ADMIN — HYDROCODONE BITARTRATE AND ACETAMINOPHEN 1 TABLET: 5; 325 TABLET ORAL at 10:05

## 2021-01-01 RX ADMIN — ATORVASTATIN CALCIUM 40 MG: 80 TABLET, FILM COATED ORAL at 20:07

## 2021-01-01 RX ADMIN — APIXABAN 5 MG: 5 TABLET, FILM COATED ORAL at 15:08

## 2021-01-01 RX ADMIN — HYDROCODONE BITARTRATE AND ACETAMINOPHEN 1 TABLET: 5; 325 TABLET ORAL at 17:42

## 2021-01-01 RX ADMIN — Medication 2 PUFF: at 14:33

## 2021-01-01 RX ADMIN — HYDROCODONE BITARTRATE AND ACETAMINOPHEN 1 TABLET: 5; 325 TABLET ORAL at 08:09

## 2021-01-01 RX ADMIN — GADOBENATE DIMEGLUMINE 20 ML: 529 INJECTION, SOLUTION INTRAVENOUS at 12:41

## 2021-01-01 RX ADMIN — Medication 2 MCG/MIN: at 02:03

## 2021-01-01 RX ADMIN — HYDROMORPHONE HYDROCHLORIDE 1 MG: 1 INJECTION, SOLUTION INTRAMUSCULAR; INTRAVENOUS; SUBCUTANEOUS at 21:49

## 2021-01-01 RX ADMIN — Medication 10 ML: at 23:40

## 2021-01-01 RX ADMIN — Medication 2 PUFF: at 16:47

## 2021-01-01 RX ADMIN — HYDROCODONE BITARTRATE AND ACETAMINOPHEN 1 TABLET: 5; 325 TABLET ORAL at 23:46

## 2021-01-01 RX ADMIN — SODIUM CHLORIDE 25 ML: 9 INJECTION, SOLUTION INTRAVENOUS at 09:07

## 2021-01-01 RX ADMIN — LOPERAMIDE HCL 4 MG: 1 SOLUTION ORAL at 22:43

## 2021-01-01 RX ADMIN — HEPARIN SODIUM 3160 UNITS: 1000 INJECTION INTRAVENOUS; SUBCUTANEOUS at 11:47

## 2021-01-01 RX ADMIN — ANTACID TABLETS 500 MG: 500 TABLET, CHEWABLE ORAL at 20:07

## 2021-01-01 RX ADMIN — MIDAZOLAM 1 MG: 1 INJECTION INTRAMUSCULAR; INTRAVENOUS at 10:46

## 2021-01-01 RX ADMIN — PROPOFOL 100 MCG/KG/MIN: 10 INJECTION, EMULSION INTRAVENOUS at 09:34

## 2021-01-01 RX ADMIN — SODIUM CHLORIDE: 9 INJECTION, SOLUTION INTRAVENOUS at 07:14

## 2021-01-01 RX ADMIN — ISOSORBIDE MONONITRATE 30 MG: 30 TABLET, EXTENDED RELEASE ORAL at 08:09

## 2021-01-01 RX ADMIN — PIPERACILLIN AND TAZOBACTAM 3375 MG: 3; .375 INJECTION, POWDER, LYOPHILIZED, FOR SOLUTION INTRAVENOUS at 11:06

## 2021-01-01 RX ADMIN — HYDROMORPHONE HYDROCHLORIDE 1 MG: 1 INJECTION, SOLUTION INTRAMUSCULAR; INTRAVENOUS; SUBCUTANEOUS at 20:17

## 2021-01-01 RX ADMIN — ATORVASTATIN CALCIUM 40 MG: 80 TABLET, FILM COATED ORAL at 23:32

## 2021-01-01 RX ADMIN — HEPARIN SODIUM 4000 UNITS: 1000 INJECTION INTRAVENOUS; SUBCUTANEOUS at 08:05

## 2021-01-01 RX ADMIN — HYDROMORPHONE HYDROCHLORIDE 1 MG: 1 INJECTION, SOLUTION INTRAMUSCULAR; INTRAVENOUS; SUBCUTANEOUS at 08:11

## 2021-01-01 RX ADMIN — MORPHINE SULFATE 15 MG: 15 TABLET, FILM COATED, EXTENDED RELEASE ORAL at 20:38

## 2021-01-01 RX ADMIN — HYDROCODONE BITARTRATE AND ACETAMINOPHEN 1 TABLET: 5; 325 TABLET ORAL at 08:45

## 2021-01-01 RX ADMIN — HYDROMORPHONE HYDROCHLORIDE 1 MG: 1 INJECTION, SOLUTION INTRAMUSCULAR; INTRAVENOUS; SUBCUTANEOUS at 15:25

## 2021-01-01 RX ADMIN — SODIUM CHLORIDE, POTASSIUM CHLORIDE, SODIUM LACTATE AND CALCIUM CHLORIDE: 600; 310; 30; 20 INJECTION, SOLUTION INTRAVENOUS at 21:35

## 2021-01-01 RX ADMIN — LINEZOLID 600 MG: 600 INJECTION, SOLUTION INTRAVENOUS at 22:28

## 2021-01-01 RX ADMIN — SODIUM BICARBONATE: 84 INJECTION, SOLUTION INTRAVENOUS at 15:09

## 2021-01-01 RX ADMIN — HEPARIN SODIUM 24 UNITS/KG/HR: 10000 INJECTION, SOLUTION INTRAVENOUS at 14:44

## 2021-01-01 RX ADMIN — SODIUM CHLORIDE 25 ML: 9 INJECTION, SOLUTION INTRAVENOUS at 14:45

## 2021-01-01 RX ADMIN — HYDROCORTISONE SODIUM SUCCINATE 50 MG: 100 INJECTION, POWDER, FOR SOLUTION INTRAMUSCULAR; INTRAVENOUS at 11:04

## 2021-01-01 RX ADMIN — Medication 10 ML: at 08:45

## 2021-01-01 RX ADMIN — MEGESTROL ACETATE 40 MG: 20 TABLET ORAL at 09:17

## 2021-01-01 RX ADMIN — SODIUM BICARBONATE: 84 INJECTION, SOLUTION INTRAVENOUS at 09:46

## 2021-01-01 RX ADMIN — MEGESTROL ACETATE 40 MG: 20 TABLET ORAL at 09:03

## 2021-01-01 RX ADMIN — ATORVASTATIN CALCIUM 40 MG: 40 TABLET, FILM COATED ORAL at 20:38

## 2021-01-01 RX ADMIN — ANTACID TABLETS 500 MG: 500 TABLET, CHEWABLE ORAL at 21:41

## 2021-01-01 RX ADMIN — HEPARIN SODIUM 31 UNITS/KG/HR: 10000 INJECTION, SOLUTION INTRAVENOUS at 07:53

## 2021-01-01 RX ADMIN — LORAZEPAM 1 MG: 1 TABLET ORAL at 19:31

## 2021-01-01 RX ADMIN — PREDNISONE 20 MG: 20 TABLET ORAL at 09:17

## 2021-01-01 RX ADMIN — HEPARIN SODIUM 4000 UNITS: 1000 INJECTION INTRAVENOUS; SUBCUTANEOUS at 14:21

## 2021-01-01 RX ADMIN — SODIUM CHLORIDE, POTASSIUM CHLORIDE, SODIUM LACTATE AND CALCIUM CHLORIDE: 600; 310; 30; 20 INJECTION, SOLUTION INTRAVENOUS at 11:47

## 2021-01-01 RX ADMIN — HYDROMORPHONE HYDROCHLORIDE 1 MG: 1 INJECTION, SOLUTION INTRAMUSCULAR; INTRAVENOUS; SUBCUTANEOUS at 14:09

## 2021-01-01 RX ADMIN — POLYETHYLENE GLYCOL 3350 17 G: 17 POWDER, FOR SOLUTION ORAL at 20:58

## 2021-01-01 RX ADMIN — MORPHINE SULFATE 15 MG: 15 TABLET, FILM COATED, EXTENDED RELEASE ORAL at 09:17

## 2021-01-01 RX ADMIN — IODIXANOL 40 ML: 320 INJECTION, SOLUTION INTRAVASCULAR at 10:55

## 2021-01-01 RX ADMIN — Medication 30 ML: at 12:29

## 2021-01-01 RX ADMIN — TETROFOSMIN 30 MILLICURIE: 1.38 INJECTION, POWDER, LYOPHILIZED, FOR SOLUTION INTRAVENOUS at 10:46

## 2021-01-01 RX ADMIN — HEPARIN SODIUM 12 UNITS/KG/HR: 10000 INJECTION, SOLUTION INTRAVENOUS at 14:17

## 2021-01-01 RX ADMIN — FENTANYL CITRATE 50 MCG: 50 INJECTION, SOLUTION INTRAMUSCULAR; INTRAVENOUS at 09:31

## 2021-01-01 RX ADMIN — Medication 2 PUFF: at 09:35

## 2021-01-01 RX ADMIN — HYDROCORTISONE SODIUM SUCCINATE 50 MG: 100 INJECTION, POWDER, FOR SOLUTION INTRAMUSCULAR; INTRAVENOUS at 02:00

## 2021-01-01 RX ADMIN — SODIUM CHLORIDE: 9 INJECTION, SOLUTION INTRAVENOUS at 09:24

## 2021-01-01 RX ADMIN — Medication 10 ML: at 09:18

## 2021-01-01 RX ADMIN — IPRATROPIUM BROMIDE AND ALBUTEROL SULFATE 1 AMPULE: .5; 3 SOLUTION RESPIRATORY (INHALATION) at 15:37

## 2021-01-01 RX ADMIN — LORAZEPAM 1 MG: 1 TABLET ORAL at 12:32

## 2021-01-01 RX ADMIN — HYDROCORTISONE SODIUM SUCCINATE 50 MG: 100 INJECTION, POWDER, FOR SOLUTION INTRAMUSCULAR; INTRAVENOUS at 18:18

## 2021-01-01 RX ADMIN — DIPHENHYDRAMINE HYDROCHLORIDE 25 MG: 25 TABLET ORAL at 13:03

## 2021-01-01 RX ADMIN — HYDROMORPHONE HYDROCHLORIDE 1 MG: 1 INJECTION, SOLUTION INTRAMUSCULAR; INTRAVENOUS; SUBCUTANEOUS at 06:45

## 2021-01-01 RX ADMIN — POLYETHYLENE GLYCOL 3350 17 G: 17 POWDER, FOR SOLUTION ORAL at 12:31

## 2021-01-01 RX ADMIN — HYDROCODONE BITARTRATE AND ACETAMINOPHEN 1 TABLET: 5; 325 TABLET ORAL at 04:18

## 2021-01-01 RX ADMIN — HYDROCORTISONE SODIUM SUCCINATE 50 MG: 100 INJECTION, POWDER, FOR SOLUTION INTRAMUSCULAR; INTRAVENOUS at 15:10

## 2021-01-01 RX ADMIN — MEGESTROL ACETATE 40 MG: 20 TABLET ORAL at 09:04

## 2021-01-01 RX ADMIN — OXYCODONE 5 MG: 5 TABLET ORAL at 23:51

## 2021-01-01 RX ADMIN — HYDROCODONE BITARTRATE AND ACETAMINOPHEN 1 TABLET: 5; 325 TABLET ORAL at 17:11

## 2021-01-01 RX ADMIN — PIPERACILLIN AND TAZOBACTAM 3375 MG: 3; .375 INJECTION, POWDER, LYOPHILIZED, FOR SOLUTION INTRAVENOUS at 21:58

## 2021-01-01 RX ADMIN — SODIUM BICARBONATE: 84 INJECTION, SOLUTION INTRAVENOUS at 16:08

## 2021-01-01 RX ADMIN — POTASSIUM CHLORIDE 10 MEQ: 10 INJECTION, SOLUTION INTRAVENOUS at 14:53

## 2021-01-01 RX ADMIN — MORPHINE SULFATE 15 MG: 15 TABLET, FILM COATED, EXTENDED RELEASE ORAL at 22:24

## 2021-01-01 RX ADMIN — MORPHINE SULFATE 15 MG: 15 TABLET, FILM COATED, EXTENDED RELEASE ORAL at 19:53

## 2021-01-01 RX ADMIN — LINEZOLID 600 MG: 600 INJECTION, SOLUTION INTRAVENOUS at 03:03

## 2021-01-01 RX ADMIN — HYDROCODONE BITARTRATE AND ACETAMINOPHEN 1 TABLET: 5; 325 TABLET ORAL at 22:31

## 2021-01-01 RX ADMIN — POLYETHYLENE GLYCOL 3350 17 G: 17 POWDER, FOR SOLUTION ORAL at 20:17

## 2021-01-01 RX ADMIN — APIXABAN 80 MG: 5 TABLET, FILM COATED ORAL at 19:15

## 2021-01-01 RX ADMIN — HEPARIN SODIUM 6310 UNITS: 1000 INJECTION INTRAVENOUS; SUBCUTANEOUS at 04:34

## 2021-01-01 RX ADMIN — ATORVASTATIN CALCIUM 40 MG: 80 TABLET, FILM COATED ORAL at 00:35

## 2021-01-01 RX ADMIN — HYDROMORPHONE HYDROCHLORIDE 1 MG: 1 INJECTION, SOLUTION INTRAMUSCULAR; INTRAVENOUS; SUBCUTANEOUS at 15:04

## 2021-01-01 RX ADMIN — POTASSIUM CHLORIDE 10 MEQ: 10 INJECTION, SOLUTION INTRAVENOUS at 10:21

## 2021-01-01 RX ADMIN — PIPERACILLIN AND TAZOBACTAM 3375 MG: 3; .375 INJECTION, POWDER, LYOPHILIZED, FOR SOLUTION INTRAVENOUS at 04:40

## 2021-01-01 RX ADMIN — ISOSORBIDE MONONITRATE 30 MG: 30 TABLET, EXTENDED RELEASE ORAL at 12:15

## 2021-01-01 RX ADMIN — LIDOCAINE HYDROCHLORIDE 40 MG: 20 INJECTION, SOLUTION EPIDURAL; INFILTRATION; INTRACAUDAL; PERINEURAL at 09:34

## 2021-01-01 RX ADMIN — ATORVASTATIN CALCIUM 40 MG: 80 TABLET, FILM COATED ORAL at 20:17

## 2021-01-01 RX ADMIN — HEPARIN SODIUM AND DEXTROSE 18 UNITS/KG/HR: 10000; 5 INJECTION INTRAVENOUS at 21:03

## 2021-01-01 RX ADMIN — HYDROMORPHONE HYDROCHLORIDE 1 MG: 1 INJECTION, SOLUTION INTRAMUSCULAR; INTRAVENOUS; SUBCUTANEOUS at 19:38

## 2021-01-01 RX ADMIN — OXYCODONE 5 MG: 5 TABLET ORAL at 14:51

## 2021-01-01 RX ADMIN — SODIUM BICARBONATE: 84 INJECTION, SOLUTION INTRAVENOUS at 16:14

## 2021-01-01 RX ADMIN — PIPERACILLIN AND TAZOBACTAM 3375 MG: 3; .375 INJECTION, POWDER, LYOPHILIZED, FOR SOLUTION INTRAVENOUS at 22:12

## 2021-01-01 RX ADMIN — CEFAZOLIN 2000 MG: 10 INJECTION, POWDER, FOR SOLUTION INTRAVENOUS at 09:20

## 2021-01-01 RX ADMIN — MORPHINE SULFATE 4 MG: 4 INJECTION INTRAVENOUS at 19:05

## 2021-01-01 RX ADMIN — SODIUM CHLORIDE, POTASSIUM CHLORIDE, SODIUM LACTATE AND CALCIUM CHLORIDE: 600; 310; 30; 20 INJECTION, SOLUTION INTRAVENOUS at 08:35

## 2021-01-01 RX ADMIN — ASPIRIN 81 MG: 81 TABLET, COATED ORAL at 07:50

## 2021-01-01 RX ADMIN — PIPERACILLIN AND TAZOBACTAM 3375 MG: 3; .375 INJECTION, POWDER, LYOPHILIZED, FOR SOLUTION INTRAVENOUS at 12:28

## 2021-01-01 RX ADMIN — CALCIUM GLUCONATE 1000 MG: 20 INJECTION, SOLUTION INTRAVENOUS at 11:56

## 2021-01-01 RX ADMIN — LORAZEPAM 0.5 MG: 0.5 TABLET ORAL at 01:29

## 2021-01-01 RX ADMIN — HEPARIN SODIUM 20 UNITS/KG/HR: 10000 INJECTION, SOLUTION INTRAVENOUS at 12:35

## 2021-01-01 RX ADMIN — HEPARIN SODIUM 4000 UNITS: 1000 INJECTION INTRAVENOUS; SUBCUTANEOUS at 22:06

## 2021-01-01 RX ADMIN — TC 99M MEDRONATE 26.99 MILLICURIE: 20 INJECTION, POWDER, LYOPHILIZED, FOR SOLUTION INTRAVENOUS at 09:44

## 2021-01-01 RX ADMIN — ENOXAPARIN SODIUM 40 MG: 40 INJECTION SUBCUTANEOUS at 07:50

## 2021-01-01 RX ADMIN — MAGNESIUM SULFATE 2000 MG: 2 INJECTION INTRAVENOUS at 15:23

## 2021-01-01 RX ADMIN — HYDROMORPHONE HYDROCHLORIDE 1 MG: 1 INJECTION, SOLUTION INTRAMUSCULAR; INTRAVENOUS; SUBCUTANEOUS at 23:33

## 2021-01-01 RX ADMIN — MELOXICAM 15 MG: 7.5 TABLET ORAL at 07:50

## 2021-01-01 RX ADMIN — LOPERAMIDE HCL 4 MG: 1 SOLUTION ORAL at 17:22

## 2021-01-01 RX ADMIN — MIDAZOLAM 1 MG: 1 INJECTION INTRAMUSCULAR; INTRAVENOUS at 10:38

## 2021-01-01 RX ADMIN — IOPAMIDOL 75 ML: 755 INJECTION, SOLUTION INTRAVENOUS at 22:07

## 2021-01-01 RX ADMIN — LINEZOLID 600 MG: 600 INJECTION, SOLUTION INTRAVENOUS at 15:08

## 2021-01-01 RX ADMIN — OXYCODONE 5 MG: 5 TABLET ORAL at 15:08

## 2021-01-01 RX ADMIN — SODIUM BICARBONATE: 84 INJECTION, SOLUTION INTRAVENOUS at 22:01

## 2021-01-01 RX ADMIN — MORPHINE SULFATE 15 MG: 15 TABLET, FILM COATED, EXTENDED RELEASE ORAL at 10:46

## 2021-01-01 RX ADMIN — IPRATROPIUM BROMIDE AND ALBUTEROL SULFATE 1 AMPULE: .5; 3 SOLUTION RESPIRATORY (INHALATION) at 12:40

## 2021-01-01 RX ADMIN — POLYETHYLENE GLYCOL 3350 17 G: 17 POWDER, FOR SOLUTION ORAL at 20:08

## 2021-01-01 RX ADMIN — ONDANSETRON 4 MG: 2 INJECTION INTRAMUSCULAR; INTRAVENOUS at 19:05

## 2021-01-01 RX ADMIN — HEPARIN SODIUM 2000 UNITS: 1000 INJECTION INTRAVENOUS; SUBCUTANEOUS at 23:12

## 2021-01-01 RX ADMIN — HYDROMORPHONE HYDROCHLORIDE 1 MG: 1 INJECTION, SOLUTION INTRAMUSCULAR; INTRAVENOUS; SUBCUTANEOUS at 21:56

## 2021-01-01 RX ADMIN — Medication 10 ML: at 12:54

## 2021-01-01 RX ADMIN — IPRATROPIUM BROMIDE AND ALBUTEROL SULFATE 1 AMPULE: .5; 3 SOLUTION RESPIRATORY (INHALATION) at 20:40

## 2021-01-01 RX ADMIN — POTASSIUM CHLORIDE 20 MEQ: 29.8 INJECTION, SOLUTION INTRAVENOUS at 09:29

## 2021-01-01 RX ADMIN — ISOSORBIDE MONONITRATE 30 MG: 30 TABLET, EXTENDED RELEASE ORAL at 07:50

## 2021-01-01 RX ADMIN — HYDROMORPHONE HYDROCHLORIDE 1 MG: 1 INJECTION, SOLUTION INTRAMUSCULAR; INTRAVENOUS; SUBCUTANEOUS at 19:41

## 2021-01-01 RX ADMIN — IOPAMIDOL 120 ML: 755 INJECTION, SOLUTION INTRAVENOUS at 19:34

## 2021-01-01 RX ADMIN — ISOSORBIDE MONONITRATE 30 MG: 30 TABLET, EXTENDED RELEASE ORAL at 08:42

## 2021-01-01 RX ADMIN — OXYCODONE HYDROCHLORIDE 10 MG: 100 SOLUTION ORAL at 16:46

## 2021-01-01 RX ADMIN — SODIUM CHLORIDE: 9 INJECTION, SOLUTION INTRAVENOUS at 19:30

## 2021-01-01 RX ADMIN — HEPARIN SODIUM 32 UNITS/KG/HR: 10000 INJECTION, SOLUTION INTRAVENOUS at 00:51

## 2021-01-01 RX ADMIN — IPRATROPIUM BROMIDE AND ALBUTEROL SULFATE 1 AMPULE: .5; 3 SOLUTION RESPIRATORY (INHALATION) at 19:56

## 2021-01-01 RX ADMIN — Medication 10 ML: at 20:53

## 2021-01-01 RX ADMIN — HEPARIN SODIUM 28 UNITS/KG/HR: 10000 INJECTION, SOLUTION INTRAVENOUS at 18:30

## 2021-01-01 RX ADMIN — AMIODARONE HYDROCHLORIDE 1 MG/MIN: 50 INJECTION, SOLUTION INTRAVENOUS at 00:33

## 2021-01-01 RX ADMIN — HYDROMORPHONE HYDROCHLORIDE 1 MG: 1 INJECTION, SOLUTION INTRAMUSCULAR; INTRAVENOUS; SUBCUTANEOUS at 14:49

## 2021-01-01 RX ADMIN — PIPERACILLIN AND TAZOBACTAM 3375 MG: 3; .375 INJECTION, POWDER, LYOPHILIZED, FOR SOLUTION INTRAVENOUS at 11:42

## 2021-01-01 RX ADMIN — HYDROMORPHONE HYDROCHLORIDE 1 MG: 1 INJECTION, SOLUTION INTRAMUSCULAR; INTRAVENOUS; SUBCUTANEOUS at 05:32

## 2021-01-01 RX ADMIN — ASPIRIN 81 MG: 81 TABLET, COATED ORAL at 08:45

## 2021-01-01 RX ADMIN — Medication 10 ML: at 22:27

## 2021-01-01 RX ADMIN — HEPARIN SODIUM 33 UNITS/KG/HR: 10000 INJECTION, SOLUTION INTRAVENOUS at 16:41

## 2021-01-01 RX ADMIN — PREDNISONE 20 MG: 20 TABLET ORAL at 10:46

## 2021-01-01 RX ADMIN — HEPARIN SODIUM 4000 UNITS: 1000 INJECTION INTRAVENOUS; SUBCUTANEOUS at 04:01

## 2021-01-01 RX ADMIN — MORPHINE SULFATE 4 MG: 4 INJECTION INTRAVENOUS at 20:13

## 2021-01-01 RX ADMIN — HYDROCORTISONE SODIUM SUCCINATE 50 MG: 100 INJECTION, POWDER, FOR SOLUTION INTRAMUSCULAR; INTRAVENOUS at 20:38

## 2021-01-01 RX ADMIN — HEPARIN SODIUM AND DEXTROSE 24 UNITS/KG/HR: 10000; 5 INJECTION INTRAVENOUS at 11:51

## 2021-01-01 RX ADMIN — IPRATROPIUM BROMIDE AND ALBUTEROL SULFATE 1 AMPULE: .5; 3 SOLUTION RESPIRATORY (INHALATION) at 12:46

## 2021-01-01 RX ADMIN — IPRATROPIUM BROMIDE AND ALBUTEROL SULFATE 1 AMPULE: .5; 3 SOLUTION RESPIRATORY (INHALATION) at 08:23

## 2021-01-01 RX ADMIN — HYDROMORPHONE HYDROCHLORIDE 1 MG: 1 INJECTION, SOLUTION INTRAMUSCULAR; INTRAVENOUS; SUBCUTANEOUS at 08:43

## 2021-01-01 RX ADMIN — MIDAZOLAM 1 MG: 1 INJECTION INTRAMUSCULAR; INTRAVENOUS at 10:42

## 2021-01-01 RX ADMIN — PIPERACILLIN AND TAZOBACTAM 3375 MG: 3; .375 INJECTION, POWDER, LYOPHILIZED, FOR SOLUTION INTRAVENOUS at 14:46

## 2021-01-01 RX ADMIN — ASPIRIN 81 MG: 81 TABLET, COATED ORAL at 08:42

## 2021-01-01 RX ADMIN — PIPERACILLIN AND TAZOBACTAM 3375 MG: 3; .375 INJECTION, POWDER, LYOPHILIZED, FOR SOLUTION INTRAVENOUS at 22:42

## 2021-01-01 RX ADMIN — FENTANYL CITRATE 25 MCG: 50 INJECTION, SOLUTION INTRAMUSCULAR; INTRAVENOUS at 09:34

## 2021-01-01 ASSESSMENT — PAIN SCALES - GENERAL
PAINLEVEL_OUTOF10: 6
PAINLEVEL_OUTOF10: 5
PAINLEVEL_OUTOF10: 9
PAINLEVEL_OUTOF10: 8
PAINLEVEL_OUTOF10: 0
PAINLEVEL_OUTOF10: 6
PAINLEVEL_OUTOF10: 0
PAINLEVEL_OUTOF10: 5
PAINLEVEL_OUTOF10: 8
PAINLEVEL_OUTOF10: 0
PAINLEVEL_OUTOF10: 7
PAINLEVEL_OUTOF10: 6
PAINLEVEL_OUTOF10: 8
PAINLEVEL_OUTOF10: 0
PAINLEVEL_OUTOF10: 7
PAINLEVEL_OUTOF10: 0
PAINLEVEL_OUTOF10: 8
PAINLEVEL_OUTOF10: 0
PAINLEVEL_OUTOF10: 8
PAINLEVEL_OUTOF10: 3
PAINLEVEL_OUTOF10: 3
PAINLEVEL_OUTOF10: 6
PAINLEVEL_OUTOF10: 8
PAINLEVEL_OUTOF10: 0
PAINLEVEL_OUTOF10: 0
PAINLEVEL_OUTOF10: 8
PAINLEVEL_OUTOF10: 8
PAINLEVEL_OUTOF10: 6
PAINLEVEL_OUTOF10: 8
PAINLEVEL_OUTOF10: 5
PAINLEVEL_OUTOF10: 5
PAINLEVEL_OUTOF10: 9
PAINLEVEL_OUTOF10: 6
PAINLEVEL_OUTOF10: 8
PAINLEVEL_OUTOF10: 7
PAINLEVEL_OUTOF10: 6
PAINLEVEL_OUTOF10: 0
PAINLEVEL_OUTOF10: 7
PAINLEVEL_OUTOF10: 6
PAINLEVEL_OUTOF10: 4
PAINLEVEL_OUTOF10: 8
PAINLEVEL_OUTOF10: 6
PAINLEVEL_OUTOF10: 8
PAINLEVEL_OUTOF10: 6
PAINLEVEL_OUTOF10: 7
PAINLEVEL_OUTOF10: 6
PAINLEVEL_OUTOF10: 7
PAINLEVEL_OUTOF10: 7
PAINLEVEL_OUTOF10: 6
PAINLEVEL_OUTOF10: 5
PAINLEVEL_OUTOF10: 8
PAINLEVEL_OUTOF10: 8
PAINLEVEL_OUTOF10: 0
PAINLEVEL_OUTOF10: 8
PAINLEVEL_OUTOF10: 5
PAINLEVEL_OUTOF10: 7
PAINLEVEL_OUTOF10: 0
PAINLEVEL_OUTOF10: 10
PAINLEVEL_OUTOF10: 8
PAINLEVEL_OUTOF10: 7
PAINLEVEL_OUTOF10: 6
PAINLEVEL_OUTOF10: 8
PAINLEVEL_OUTOF10: 0
PAINLEVEL_OUTOF10: 0
PAINLEVEL_OUTOF10: 4
PAINLEVEL_OUTOF10: 0
PAINLEVEL_OUTOF10: 3
PAINLEVEL_OUTOF10: 8
PAINLEVEL_OUTOF10: 5
PAINLEVEL_OUTOF10: 0
PAINLEVEL_OUTOF10: 6
PAINLEVEL_OUTOF10: 0
PAINLEVEL_OUTOF10: 6
PAINLEVEL_OUTOF10: 5
PAINLEVEL_OUTOF10: 7
PAINLEVEL_OUTOF10: 7
PAINLEVEL_OUTOF10: 0
PAINLEVEL_OUTOF10: 5
PAINLEVEL_OUTOF10: 0
PAINLEVEL_OUTOF10: 0
PAINLEVEL_OUTOF10: 7
PAINLEVEL_OUTOF10: 8
PAINLEVEL_OUTOF10: 7
PAINLEVEL_OUTOF10: 8
PAINLEVEL_OUTOF10: 0
PAINLEVEL_OUTOF10: 0
PAINLEVEL_OUTOF10: 6
PAINLEVEL_OUTOF10: 7
PAINLEVEL_OUTOF10: 6
PAINLEVEL_OUTOF10: 5
PAINLEVEL_OUTOF10: 7
PAINLEVEL_OUTOF10: 0
PAINLEVEL_OUTOF10: 0
PAINLEVEL_OUTOF10: 7
PAINLEVEL_OUTOF10: 0
PAINLEVEL_OUTOF10: 6
PAINLEVEL_OUTOF10: 5
PAINLEVEL_OUTOF10: 0
PAINLEVEL_OUTOF10: 6
PAINLEVEL_OUTOF10: 0
PAINLEVEL_OUTOF10: 0
PAINLEVEL_OUTOF10: 5
PAINLEVEL_OUTOF10: 6
PAINLEVEL_OUTOF10: 8
PAINLEVEL_OUTOF10: 7
PAINLEVEL_OUTOF10: 8
PAINLEVEL_OUTOF10: 0

## 2021-01-01 ASSESSMENT — PAIN - FUNCTIONAL ASSESSMENT
PAIN_FUNCTIONAL_ASSESSMENT: ACTIVITIES ARE NOT PREVENTED
PAIN_FUNCTIONAL_ASSESSMENT: ACTIVITIES ARE NOT PREVENTED
PAIN_FUNCTIONAL_ASSESSMENT: 0-10
PAIN_FUNCTIONAL_ASSESSMENT: ACTIVITIES ARE NOT PREVENTED

## 2021-01-01 ASSESSMENT — PAIN DESCRIPTION - ONSET
ONSET: ON-GOING

## 2021-01-01 ASSESSMENT — PAIN DESCRIPTION - ORIENTATION
ORIENTATION: RIGHT
ORIENTATION: MID
ORIENTATION: RIGHT;LOWER
ORIENTATION: RIGHT
ORIENTATION: MID
ORIENTATION: RIGHT
ORIENTATION: LEFT
ORIENTATION: RIGHT
ORIENTATION: RIGHT;LEFT
ORIENTATION: LOWER;RIGHT
ORIENTATION: RIGHT;LOWER
ORIENTATION: RIGHT
ORIENTATION: RIGHT;LEFT
ORIENTATION: RIGHT;LEFT
ORIENTATION: RIGHT;LOWER
ORIENTATION: MID
ORIENTATION: RIGHT;LEFT
ORIENTATION: LEFT;RIGHT
ORIENTATION: RIGHT;OUTER
ORIENTATION: RIGHT
ORIENTATION: MID
ORIENTATION: LOWER;RIGHT

## 2021-01-01 ASSESSMENT — PULMONARY FUNCTION TESTS
PIF_VALUE: 1
PIF_VALUE: 3
PIF_VALUE: 1

## 2021-01-01 ASSESSMENT — PAIN DESCRIPTION - LOCATION
LOCATION: LEG
LOCATION: CHEST
LOCATION: LEG
LOCATION: CHEST
LOCATION: LEG
LOCATION: CHEST
LOCATION: LEG
LOCATION: CHEST
LOCATION: LEG
LOCATION: LEG;KNEE
LOCATION: LEG
LOCATION: LEG

## 2021-01-01 ASSESSMENT — PAIN DESCRIPTION - DESCRIPTORS
DESCRIPTORS: ACHING
DESCRIPTORS: DULL;THROBBING
DESCRIPTORS: THROBBING;DULL
DESCRIPTORS: DULL;THROBBING
DESCRIPTORS: ACHING
DESCRIPTORS: ACHING
DESCRIPTORS: DULL;THROBBING
DESCRIPTORS: ACHING
DESCRIPTORS: DULL;THROBBING
DESCRIPTORS: DULL;THROBBING
DESCRIPTORS: ACHING
DESCRIPTORS: DULL;THROBBING

## 2021-01-01 ASSESSMENT — ENCOUNTER SYMPTOMS
DIARRHEA: 0
ABDOMINAL PAIN: 0
EYE REDNESS: 0
CHEST TIGHTNESS: 0
BACK PAIN: 0
COUGH: 1
VOMITING: 0
RHINORRHEA: 0
RHINORRHEA: 0
CONSTIPATION: 0
VOMITING: 0
SHORTNESS OF BREATH: 0
DIARRHEA: 0
COUGH: 0
NAUSEA: 0
NAUSEA: 0
PHOTOPHOBIA: 0
SHORTNESS OF BREATH: 1
EYE PAIN: 0
ABDOMINAL PAIN: 0
SORE THROAT: 0

## 2021-01-01 ASSESSMENT — PAIN DESCRIPTION - PAIN TYPE
TYPE: ACUTE PAIN
TYPE: ACUTE PAIN
TYPE: CHRONIC PAIN
TYPE: ACUTE PAIN
TYPE: CHRONIC PAIN
TYPE: ACUTE PAIN
TYPE: CHRONIC PAIN
TYPE: ACUTE PAIN
TYPE: CHRONIC PAIN
TYPE: ACUTE PAIN
TYPE: CHRONIC PAIN
TYPE: CHRONIC PAIN
TYPE: ACUTE PAIN
TYPE: CHRONIC PAIN
TYPE: ACUTE PAIN
TYPE: CHRONIC PAIN
TYPE: ACUTE PAIN

## 2021-01-01 ASSESSMENT — PAIN DESCRIPTION - PROGRESSION

## 2021-01-01 ASSESSMENT — PAIN DESCRIPTION - FREQUENCY
FREQUENCY: CONTINUOUS

## 2021-01-01 ASSESSMENT — LIFESTYLE VARIABLES: SMOKING_STATUS: 0

## 2021-09-06 NOTE — ED NOTES
Pt d/c to per MD order, A&Ox4, respirations easy, even and unlabored, no distress noted. 1 script provided. Pt educated on s/s of adverse/allergic reactions and when to seek additional medical treatment. Pt provided with follow up testing paperwork. All questions, needs and concerns addressed per this RN, denies further. Pt ambulated from dept with steady gait. Home by self.       Ryder Gil RN  09/06/21 8268

## 2021-09-06 NOTE — ED NOTES
Blood work obtained and sent to lab via straight stick. Pt tolerated well.       Ekaterina Ellsworth RN  09/06/21 3819

## 2021-09-07 NOTE — ED PROVIDER NOTES
[address]      Pt Name: Titi Kaba  MRN: [id number]  [place] 1950  Date of evaluation: 9/6/2021  Provider: SEAN Love    The Attending Physician was available for consultation but did not see or evaluate this patient. CHIEF COMPLAINT       Chief Complaint   Patient presents with    Leg Pain     right lower leg        HISTORY OF PRESENT ILLNESS  (Location/Symptom, Timing/Onset, Context/Setting, Quality, Duration, Modifying Factors, Severity.)   Titi Kaba is a 73 y.o. male who presents to the emergency department with complaint of right lower leg pain. He says it began a couple of days ago, has been persistent since then, with no related trauma. He says it is kind of in the back and lateral aspect of the ankle, but rises up into the calf. Says it seems to be there constantly. Denies any prior history of pain like this. Denies any symptoms in the left leg. No history of blood clot, takes no blood thinning medication. Denies any relevant medical problems or any other recent illness. No other complaints. Nursing Notes were reviewed and I agree. REVIEW OF SYSTEMS    (2-9 systems for level 4, 10 or more for level 5)     Constitutional:  Negative for fever, chills. Respiratory:  Negative for cough, shortness of breath. Cardiovascular:  Negative for chest pain, palpitations. Gastrointestinal:  Negative for vomiting, abdominal pain. Musculoskeletal: Positive for right lower leg pain. Negative for arthralgias, neck pain or stiffness. Neurological:  Negative for syncope, dizziness, focal weakness, numbness. All other positives and pertinent negatives as per HPI.       PAST MEDICAL HISTORY         Diagnosis Date    Arthritis     De Quervain's tenosynovitis     Diverticulitis     Gout        SURGICAL HISTORY           Procedure Laterality Date    HERNIA REPAIR      bilateral inguinal    HIP SURGERY      right hip EKG.    RADIOLOGY:     Interpretation per the Radiologist below, if available at the time of this note:    XR TIBIA FIBULA RIGHT (2 VIEWS)   Final Result   Soft tissue swelling. No acute osseous abnormality. VL DUP LOWER EXTREMITY VENOUS RIGHT    (Results Pending)       LABS:  Labs Reviewed   CBC WITH AUTO DIFFERENTIAL - Abnormal; Notable for the following components:       Result Value    Hemoglobin 9.7 (*)     Hematocrit 30.1 (*)     MCV 70.1 (*)     MCH 22.5 (*)     Platelets 069 (*)     All other components within normal limits    Narrative:     Performed at:  [address] [place] [address]   Phone [phone]   D-DIMER, QUANTITATIVE - Abnormal; Notable for the following components:    D-Dimer, Quant 1579 (*)     All other components within normal limits    Narrative:     Performed at:  [address] [place] [address]   Phone [phone]   [facility] FOR LOW K - Abnormal; Notable for the following components:    Chloride 98 (*)     All other components within normal limits    Narrative:     Performed at:  [facility]  [address] [place] [address]   Phone [phone]       All other labs were within normal range or not returned as of this dictation. EMERGENCY DEPARTMENT COURSE and DIFFERENTIAL DIAGNOSIS/MDM:   Vitals:    Vitals:    09/06/21 1507 09/06/21 1917   BP: 95/72 133/82   Pulse: 80 72   Resp: 16 15   Temp: 97.2 °F (36.2 °C)    TempSrc: Temporal    SpO2: 98% 100%   Weight: 174 lb 6.1 oz (79.1 kg)        The patient's condition in the ED was good. Good neurovascular status in the affected extremity. No trauma reported. Doppler venous ultrasound is not available at this time, and basic laboratory work revealed a D-dimer of 1579. Normal kidney function.   With some suspicion for DVT, patient was given a supply of Eliquis with the first dose given in the ED, and he will be ordered an outpatient Doppler venous ultrasound of the right leg to be performed tomorrow. No complaint of chest pain or shortness of breath, no tachycardia. There was no indication for hospitalization or further workup. Patient was discharged. The patient verbalized understanding and agreement with this plan of care. The patient was advised to return to the emergency department if symptoms should significantly worsen or if new and concerning symptoms should appear. I estimate there is LOW risk for FRACTURE, COMPARTMENT SYNDROME, SEPTIC ARTHRITIS, NEUROVASCULAR COMPROMISE, or TENDON/LIGAMENT RUPTURE, thus I consider the discharge disposition reasonable. Contraindications to therapy  ALL ANSWERS SHOULD BE NO     History of major surgery in the past 48 hours? NO  Patient currently has active bleeding? NO  Patient is pregnant? NO  Existing liver disease or coagulopathy? NO  History of intracranial hemorrhage? NO  Lumbar puncture or epidural anesthesia in past 48 hours NO  History of anaphylaxis to Xa inhibitors NO  History of Chronic Kidney disease NO  Is patient already on coumadin, Xa inhibitor, or heparins NO    All YES Answers    Patient has suspected DVT? YES  Family understands medication is a blood thinner? YES  Patient has prescription for venous duplex of extremity? YES  Understands follow up plan? YES  DVT discharge instruction/plan provided? YES  Personally dispensed medication to patient YES  Compressible common femoral vein on bedside ultrasound YES    All NO Answers    Contraindications to treatment? NO  Clinically suspect pulmonary embolism? NO      PROCEDURES:  None    FINAL IMPRESSION      1. Right leg pain    2. Elevated d-dimer          DISPOSITION/PLAN   DISPOSITION Decision To Discharge 09/06/2021 07:03:20 PM      PATIENT REFERRED TO:  No follow-up provider specified.     DISCHARGE MEDICATIONS:  Discharge Medication List as of 9/6/2021  7:22 PM      START taking these medications    Details   HYDROcodone-acetaminophen (NORCO) 5-325 MG per tablet Take 1 tablet by mouth every 6 hours as needed for Pain for up to 5 days. , Disp-10 tablet, R-0Print             (Please note that portions of this note were completed with a voice recognition program.  Efforts were made to edit the dictations but occasionally words are mis-transcribed.)    Yomaira Israel, [address] [place] [address]  09/07/21 0001

## 2021-09-07 NOTE — PROGRESS NOTES
Jaun Caballero was recently in the ED with concerns for a DVT. They are here today for a DVT Study to rule out a DVT. This visit was completed as a:  THIS VISIT WAS PERFORMED AS: An in person visit. Protocols were followed with precautions to reduce the spread of COVID-19. The DVT study today was found to be negative. Jaun Caballero returned the remaining Eliquis tablets that were given in the ED as part of the DVT program. These will be disposed of properly. It was recommended that they follow up with their PCP within 7 days. If the patient currently does not have a PCP, they were given a list of [facility] physicians. Type of Study:        Veins:Lower Extremities DVT Study, VL EXTREMITY VENOUS DUPLEX RIGHT.       Tech Comments   Right   No evidence of deep vein or superficial vein thrombosis involving the right   lower extremity and the left common femoral vein.       Incidental finding on the right: proximal popliteal artery appears dilated at   2 cm by 2 cm. They were counseled on signs and symptoms of DVT and if symptoms should worsen to seek medical attention. DVT signs and symptoms can include:    Swelling in the affected leg. Rarely, there's swelling in both legs. Pain in your leg. The pain often starts in your calf and can feel like cramping or soreness. Red or discolored skin on the leg. A feeling of warmth in the affected leg.     [address] [place] [address]  Anticoagulation Service  DVT Program  [phone]      CLINICAL PHARMACY CONSULT: MED RECONCILIATION/REVIEW ADDENDUM    For Pharmacy Admin Tracking Only     Intervention Detail:    Total # of Interventions Recommended: 1   Total # of Interventions Accepted: 1   Time Spent (min): 15

## 2021-09-10 PROBLEM — I72.4 POPLITEAL ARTERY ANEURYSM (HCC): Status: ACTIVE | Noted: 2021-01-01

## 2021-09-10 PROBLEM — I70.201 RIGHT POPLITEAL ARTERY OCCLUSION (HCC): Status: ACTIVE | Noted: 2021-01-01

## 2021-09-10 PROBLEM — I70.202: Status: ACTIVE | Noted: 2021-01-01

## 2021-09-10 PROBLEM — I73.9 PERIPHERAL ARTERIAL DISEASE (HCC): Status: ACTIVE | Noted: 2021-01-01

## 2021-09-10 PROBLEM — N28.89 LEFT RENAL MASS: Status: ACTIVE | Noted: 2021-01-01

## 2021-09-10 NOTE — ED PROVIDER NOTES
Triage EKG:    The 12 lead EKG was interpreted by me as follows:  Rate: normal with a rate of 78  Rhythm: sinus  Axis: left deviation  Intervals: first degree AVB, narrow QRS, normal QTc  ST segments: no ST elevations or depressions  T waves: no abnormal inversions  Non-specific T wave changes: present  Prior EKG comparison: EKG dated 3/18/11 is not significantly different        Margaux Lopez MD  09/10/21 1829

## 2021-09-10 NOTE — ED PROVIDER NOTES
[address] [facility]        Pt Name: Titi Kaba  MRN: [id number]  [place] 1950  Date of evaluation: 9/10/2021  Provider: Marcie Huerta PA-C  PCP: Tracee Busch MD  Note Started: 6:55 PM EDT        I have seen and evaluated this patient with my supervising physician Juli Diez, *. CHIEF COMPLAINT       Chief Complaint   Patient presents with    Leg Pain     Pt diagnosed with popliteal artery aneurysm. Follow up with vascular surgery in 2 week, but \"pain is so bad\"        HISTORY OF PRESENT ILLNESS   (Location, Timing/Onset, Context/Setting, Quality, Duration, Modifying Factors, Severity, Associated Signs and Symptoms)  Note limiting factors. Chief Complaint: Right knee pain    Titi Kaba is a 73 y.o. male who presents to the emergency department today for evaluation for right knee pain. The patient states that he has been experiencing pain behind his right knee, and he states that this has been ongoing for approximately 1 week. The patient denies falling or injuring himself in any way, he states that he saw his primary care physician for this, he had an elevated D-dimer and was sent to [facility] to have a Doppler obtained. Patient states that he did have an ultrasound obtained on 9/7/2021, and he states that this had an incidental finding of a popliteal aneurysm 2 cm in size on the right. The patient states that he is since follow-up with primary care physician because he continues to have pain behind the right knee, he states that his pain is constant, worse with exertion, and does improve with rest.  The patient is currently rating his discomfort as an 8/10, and he otherwise denies any known alleviating or aggravating factors. The patient is a smoker. He states that he is not on any blood thinners. The patient denies any numbness, tilling or weakness. He denies any chest pain or shortness of breath.   No abdominal pain. He denies any urinary symptoms. He states that he is able to ambulate but does experience pain. The patient otherwise has no complaints at this time    Nursing Notes were all reviewed and agreed with or any disagreements were addressed in the HPI. REVIEW OF SYSTEMS    (2-9 systems for level 4, 10 or more for level 5)     Review of Systems   Constitutional: Negative for activity change, appetite change, chills and fever. HENT: Negative for congestion and rhinorrhea. Respiratory: Negative for cough and shortness of breath. Cardiovascular: Negative for chest pain. Gastrointestinal: Negative for abdominal pain, diarrhea, nausea and vomiting. Genitourinary: Negative for difficulty urinating, dysuria and hematuria. Musculoskeletal: Positive for arthralgias. Neurological: Negative for weakness and numbness. Positives and Pertinent negatives as per HPI. Except as noted above in the ROS, all other systems were reviewed and negative. PAST MEDICAL HISTORY     Past Medical History:   Diagnosis Date    Arthritis     De Quervain's tenosynovitis     Diverticulitis     Gout          SURGICAL HISTORY     Past Surgical History:   Procedure Laterality Date    HERNIA REPAIR      bilateral inguinal    HIP SURGERY      right hip replacement    HIP SURGERY  3/22/11    REMOVAL OF HARDWARE, RIGHT HIP REVISION, CEMENTED    LIVER SURGERY           CURRENTMEDICATIONS       Previous Medications    HYDROCODONE-ACETAMINOPHEN (NORCO) 5-325 MG PER TABLET    Take 1 tablet by mouth every 6 hours as needed for Pain for up to 5 days. ISOSORBIDE MONONITRATE (IMDUR) 30 MG EXTENDED RELEASE TABLET    Take 30 mg by mouth daily    MELOXICAM (MOBIC) 15 MG TABLET    Take 15 mg by mouth daily    MULTIPLE VITAMIN PO    Take 1 tablet by mouth daily. NABUMETONE (RELAFEN) 500 MG TABLET    Take 500 mg by mouth 2 times daily         ALLERGIES     Patient has no known allergies.     FAMILYHISTORY Family History   Problem Relation Age of Onset    Cancer Mother         lung    Heart Disease Maternal Grandmother           SOCIAL HISTORY       Social History     Tobacco Use    Smoking status: Current Every Day Smoker     Years: 20.00     Types: Cigars    Smokeless tobacco: Never Used    Tobacco comment: smokes 5 cigars/day   Substance Use Topics    Alcohol use: No     Comment: former    Drug use: No       SCREENINGS             PHYSICAL EXAM    (up to 7 for level 4, 8 or more for level 5)     ED Triage Vitals   BP Temp Temp Source Pulse Resp SpO2 Height Weight   09/10/21 1756 09/10/21 1756 09/10/21 1756 09/10/21 1756 09/10/21 1756 09/10/21 1756 09/10/21 1757 09/10/21 1757   126/64 98.3 °F (36.8 °C) Oral 79 16 97 % 6' 2\" (1.88 m) 179 lb (81.2 kg)       Physical Exam  Vitals and nursing note reviewed. Constitutional:       Appearance: He is well-developed. He is not diaphoretic. HENT:      Head: Normocephalic and atraumatic. Right Ear: External ear normal.      Left Ear: External ear normal.      Nose: Nose normal.   Eyes:      General:         Right eye: No discharge. Left eye: No discharge. Neck:      Trachea: No tracheal deviation. Cardiovascular:      Rate and Rhythm: Normal rate and regular rhythm. Pulses:           Femoral pulses are 2+ on the right side. Dorsalis pedis pulses are 2+ on the right side and 2+ on the left side. Posterior tibial pulses are 2+ on the right side and 2+ on the left side. Heart sounds: No murmur heard. Comments: Bilateral feet are pale, but with good cap refill. No cyanosis. No necrosis. Full range of motion of all joints. Pulmonary:      Effort: Pulmonary effort is normal. No respiratory distress. Breath sounds: Normal breath sounds. No wheezing or rales. Abdominal:      General: Bowel sounds are normal. There is no distension. Palpations: Abdomen is soft. Tenderness:  There is no abdominal tenderness. There is no guarding. Musculoskeletal:         General: Normal range of motion. Cervical back: Normal range of motion and neck supple. Skin:     General: Skin is warm and dry. Neurological:      Mental Status: He is alert and oriented to person, place, and time. Psychiatric:         Behavior: Behavior normal.         DIAGNOSTIC RESULTS   LABS:    Labs Reviewed   CBC WITH AUTO DIFFERENTIAL - Abnormal; Notable for the following components:       Result Value    RBC 3.50 (*)     Hemoglobin 8.1 (*)     Hematocrit 24.5 (*)     MCV 70.0 (*)     MCH 23.0 (*)     Lymphocytes Absolute 0.9 (*)     All other components within normal limits    Narrative:     Performed at:  [facility] [address], [facility], [address] [place] [address]   Phone [phone]   COMPREHENSIVE METABOLIC PANEL - Abnormal; Notable for the following components:    Glucose 132 (*)     CREATININE 0.7 (*)     Albumin/Globulin Ratio 0.8 (*)     ALT <5 (*)     AST 7 (*)     All other components within normal limits    Narrative:     Performed at:  [facility] [address], [facility], [address] [place] [address]   Phone [phone]   PROTIME-INR - Abnormal; Notable for the following components:    Protime 16.4 (*)     INR 1.43 (*)     All other components within normal limits    Narrative:     Performed at:  [facility] [address], [facility], [address] [place] [address]   Phone [phone]   APTT    Narrative:     Performed at:  [facility] [address], [facility], [address] [place] [address]   Phone [phone]       When ordered only abnormal lab results are displayed. All other labs were within normal range or not returned as of this dictation. EKG: When ordered, EKG's are interpreted by the Emergency Department Physician in the absence of a cardiologist.  Please see their note for interpretation of EKG.     RADIOLOGY:   Non-plain film images or superficial vein thrombosis involving the right lower extremity and the left common femoral vein. Incidental finding on the right: proximal popliteal artery appears dilated at 2 cm by 2 cm. Conclusions   Summary   No evidence of deep vein or superficial vein thrombosis involving the right  lower extremity. Incidental finding on the right: popliteal aneurysm (2 cm in size)   Signature   ------------------------------------------------------------------  Electronically signed by Carol Thapa MD (Interpreting  physician) on 09/08/2021 at 04:52 PM  ------------------------------------------------------------------  Patient Status:Routine. Study [address] [facility]. Technical Quality:Adequate visualization. Velocities are measured in cm/s ; Diameters are measured in mm Right Lower Extremities DVT Study Measurements Right 2D Measurements +------------------------+----------+---------------+----------+ ! Location                ! Visualized! Compressibility! Thrombosis! +------------------------+----------+---------------+----------+ ! Sapheno Femoral Junction! Yes       ! Yes            ! None      ! +------------------------+----------+---------------+----------+ ! GSV Thigh               ! Yes       ! Yes            ! None      ! +------------------------+----------+---------------+----------+ ! Common Femoral          !Yes       ! Yes            ! None      ! +------------------------+----------+---------------+----------+ ! Prox Femoral            !Yes       ! Yes            ! None      ! +------------------------+----------+---------------+----------+ ! Mid Femoral             !Yes       ! Yes            ! None      ! +------------------------+----------+---------------+----------+ ! Dist Femoral            !Yes       ! Yes            ! None      ! +------------------------+----------+---------------+----------+ ! Deep Femoral            !Yes       ! Yes            ! None      ! +------------------------+----------+---------------+----------+ ! Popliteal               !Yes       ! Yes            ! None      ! +------------------------+----------+---------------+----------+ ! GSV Below Knee          ! Yes       ! Yes            ! None      ! +------------------------+----------+---------------+----------+ ! Gastroc                 ! Yes       ! Yes            ! None      ! +------------------------+----------+---------------+----------+ ! Soleal                  !Yes       ! Yes            ! None      ! +------------------------+----------+---------------+----------+ ! PTV                     ! Yes       ! Yes            ! None      ! +------------------------+----------+---------------+----------+ ! ATV                     ! Yes       ! Yes            ! None      ! +------------------------+----------+---------------+----------+ ! Peroneal                !Yes       ! Yes            ! None      ! +------------------------+----------+---------------+----------+ ! GSV Calf                ! Yes       ! Yes            ! None      ! +------------------------+----------+---------------+----------+ ! SSV                     ! Yes       ! Yes            ! None      ! +------------------------+----------+---------------+----------+ Right Doppler Measurements +--------------+------+------+------------+ ! Location      ! Signal!Reflux! Reflux (sec)! +--------------+------+------+------------+ ! Common Femoral!Phasic! No    !            ! +--------------+------+------+------------+ ! Popliteal     !Phasic! No    !            ! +--------------+------+------+------------+ Left Lower Extremities DVT Study Measurements Left 2D Measurements +--------------+----------+---------------+----------+ ! Location      ! Visualized! Compressibility! Thrombosis! +--------------+----------+---------------+----------+ ! Common Femoral!Yes       ! Yes            ! None      ! +--------------+----------+---------------+----------+ Left Doppler Measurements +--------------+------+------+------------+ ! Location      ! Signal!Reflux! Reflux (sec)! +--------------+------+------+------------+ ! Common Femoral!Phasic! No    !            ! +--------------+------+------+------------+          PROCEDURES   Unless otherwise noted below, none     Procedures    CRITICAL CARE TIME   N/A    CONSULTS:  None      EMERGENCY DEPARTMENT COURSE and DIFFERENTIAL DIAGNOSIS/MDM:   Vitals:    Vitals:    09/10/21 1756 09/10/21 1757   BP: 126/64    Pulse: 79    Resp: 16    Temp: 98.3 °F (36.8 °C)    TempSrc: Oral    SpO2: 97%    Weight:  179 lb (81.2 kg)   Height:  6' 2\" (1.88 m)       Patient was given the following medications:  Medications   morphine injection 4 mg (4 mg IntraVENous Given 9/10/21 1905)   ondansetron (ZOFRAN) injection 4 mg (4 mg IntraVENous Given 9/10/21 1905)   iopamidol (ISOVUE-370) 76 % injection 120 mL (120 mLs IntraVENous Given 9/10/21 1934)           Briefly, this is a 28-year-old male who presents emergency department today for evaluation for right knee pain. This has been ongoing for 1 week, he states that this did after he was cutting the grass and noticed some pain behind his right knee. He had an elevated D-dimer, had a DVT study which was negative but did show an incidental findings of a popliteal aneurysm on the right. He states that he continues to have 10 out of 10 pain especially with walking, improves with rest.    On examination, there is no reproducible bony tenderness. He has a femoral pulse of 2+, dorsalis pedis and posterior tibialis pulse are 2+. Both of his feet appear to be minimally pale, however he has good cap refill and is otherwise neurovascularly intact    CBC shows no evidence of leukocytosis, mild anemia of 8.1. CMP is unremarkable. CTA is pending, this marks in my shift, please see attending for details and final disposition    FINAL IMPRESSION      1.  Right leg pain          DISPOSITION/PLAN   DISPOSITION        PATIENT REFERRED

## 2021-09-11 PROBLEM — I10 HTN (HYPERTENSION): Status: ACTIVE | Noted: 2021-01-01

## 2021-09-11 PROBLEM — D64.9 ANEMIA: Status: ACTIVE | Noted: 2021-01-01

## 2021-09-11 NOTE — PLAN OF CARE
Problem: Pain:  Goal: Pain level will decrease  Description: Pain level will decrease  9/11/2021 0158 by Yasmine Ledbetter RN  Outcome: Ongoing  9/10/2021 2354 by Yasmine Ledbetter RN  Outcome: Ongoing  Goal: Control of acute pain  Description: Control of acute pain  9/11/2021 0158 by Yasmine Ledbetter RN  Outcome: Ongoing  9/10/2021 2354 by Yasmine Ledbetter RN  Outcome: Ongoing  Goal: Control of chronic pain  Description: Control of chronic pain  9/11/2021 0158 by Yasmine Ledbetter RN  Outcome: Ongoing  9/10/2021 2354 by Yasmine Ledbetter RN  Outcome: Ongoing     Problem: Falls - Risk of:  Goal: Will remain free from falls  Description: Will remain free from falls  9/11/2021 0158 by Yasmine Ledbetter RN  Outcome: Ongoing  9/10/2021 2354 by Yasmine Ledbetter RN  Outcome: Ongoing  Goal: Absence of physical injury  Description: Absence of physical injury  9/11/2021 0158 by Yasmine Ledbetter RN  Outcome: Ongoing  9/10/2021 2354 by Yasmine Ledbetter RN  Outcome: Ongoing     Problem: Tissue Perfusion - Renal, Altered:  Goal: Electrolytes within specified parameters  Description: Electrolytes within specified parameters  Outcome: Ongoing  Goal: Urine creatinine clearance will be within specified parameters  Description: Urine creatinine clearance will be within specified parameters  Outcome: Ongoing  Goal: Serum creatinine will be within specified parameters  Description: Serum creatinine will be within specified parameters  Outcome: Ongoing  Goal: Ability to achieve a balanced intake and output will improve  Description: Ability to achieve a balanced intake and output will improve  Outcome: Ongoing     Pt arrived on unit Pt introduction made. VSS. A+Ox4. No signs of SOB. POC reviewed w/ pt. Bed in lowest position. Wheels locked. Pt expresses no further needs at this time.

## 2021-09-11 NOTE — H&P
Hospital Medicine History & Physical      PCP: Hilda Galloway MD    Date of Admission: 9/10/2021    Date of Service: Pt seen/examined on 9/10/2021  and Admitted to Inpatient with expected LOS greater than two midnights due to medical therapy. Chief Complaint:    Chief Complaint   Patient presents with    Leg Pain     Pt diagnosed with popliteal artery aneurysm. Follow up with vascular surgery in 2 week, but \"pain is so bad\"         History Of Present Illness: The patient is a 73 y.o. male with history of gout, right leg pain which has recently been investigated found to have bilateral popliteal aneurysm with peripheral arterial disease. He has not yet been seen by vascular. He presented today due to increased right popliteal pain and associated intermittent claudication to the right calf. No fevers or chills. He reports calf pain with activity improved with rest.  In the ER CTA abdominal aortogram with runoff was done which was noted for incidental large adrenal mass with local metastasis and peripheral arterial disease noted. He has left popliteal stenosis with aneurysm as well as right pop to the aneurysm with no flow. The leg is warm to touch and reports pain only with activity and when standing on it at the time of evaluation he is not in any significant pain. Past Medical History:        Diagnosis Date    Arthritis     De Quervain's tenosynovitis     Diverticulitis     Gout        Past Surgical History:        Procedure Laterality Date    HERNIA REPAIR      bilateral inguinal    HIP SURGERY      right hip replacement    HIP SURGERY  3/22/11    REMOVAL OF HARDWARE, RIGHT HIP REVISION, CEMENTED    LIVER SURGERY         Medications Prior to Admission:    Prior to Admission medications    Medication Sig Start Date End Date Taking?  Authorizing Provider   nabumetone (RELAFEN) 500 MG tablet Take 500 mg by mouth 2 times daily   Yes Historical Provider, MD   isosorbide mononitrate (IMDUR) 30 MG extended release tablet Take 30 mg by mouth daily   Yes Historical Provider, MD   meloxicam (MOBIC) 15 MG tablet Take 15 mg by mouth daily 8/23/21   Historical Provider, MD   HYDROcodone-acetaminophen (NORCO) 5-325 MG per tablet Take 1 tablet by mouth every 6 hours as needed for Pain for up to 5 days. 9/6/21 9/11/21  SEAN Handley   MULTIPLE VITAMIN PO Take 1 tablet by mouth daily. 3/18/11   Historical Provider, MD       Allergies:  Patient has no known allergies. Social History:  The patient currently lives with family    TOBACCO:   reports that he has been smoking cigars. He has smoked for the past 20.00 years. He has never used smokeless tobacco.  ETOH:   reports no history of alcohol use. Family History:  Reviewed in detail and negative for DM, Early CAD, Cancer, CVA. Positive as follows:        Problem Relation Age of Onset    Cancer Mother         lung    Heart Disease Maternal Grandmother        REVIEW OF SYSTEMS:   Positive for right leg pain and as noted in the HPI. All other systems reviewed and negative. PHYSICAL EXAM:    /71   Pulse 79   Temp 98.3 °F (36.8 °C) (Oral)   Resp 16   Ht 6' 2\" (1.88 m)   Wt 179 lb (81.2 kg)   SpO2 94%   BMI 22.98 kg/m²     General appearance: No apparent distress appears stated age and cooperative. HEENT Normal cephalic, atraumatic without obvious deformity. Pupils equal, round, and reactive to light. Extra ocular muscles intact. Conjunctivae/corneas clear. Neck: Supple, No jugular venous distention/bruits. Lungs: Clear to auscultation, bilaterally without Rales/Wheezes/Rhonchi with good respiratory effort. Heart: Regular rate and rhythm with Normal S1/S2 without murmurs, rubs or gallops  Abdomen: Soft, non-tender or non-distended without rigidity or guarding and positive bowel sounds all four quadrants. Extremities: No clubbing, cyanosis, or edema bilaterally.    Skin: Skin color, texture, turgor normal.  No rashes or lesions. Neurologic: Alert and oriented X 3, neurovascularly intact with sensory/motor intact upper extremities/lower extremities, bilaterally. Cranial nerves: II-XII intact, grossly non-focal.  Mental status: Alert, oriented, thought content appropriate. CBC   Recent Labs     09/10/21  1821   WBC 6.5   HGB 8.1*   HCT 24.5*         RENAL  Recent Labs     09/10/21  1821      K 4.7   CL 99   CO2 27   BUN 14   CREATININE 0.7*     LFT'S  Recent Labs     09/10/21  1821   AST 7*   ALT <5*   BILITOT 0.3   ALKPHOS 85     COAG  Recent Labs     09/10/21  1821   INR 1.43*     CARDIAC ENZYMES  No results for input(s): CKTOTAL, CKMB, CKMBINDEX, TROPONINI in the last 72 hours.     U/A:    Lab Results   Component Value Date    COLORU YELLOW 03/18/2011    WBCUA 0-3 03/18/2011    RBCUA 0-2 03/18/2011    CLARITYU CLEAR 03/18/2011    SPECGRAV 1.010 03/18/2011    LEUKOCYTESUR TRACE 03/18/2011    BLOODU NEGATIVE 03/18/2011    GLUCOSEU NEGATIVE 03/18/2011         Active Hospital Problems    Diagnosis Date Noted    Left renal mass [N28.89] 09/10/2021    Peripheral arterial disease ([address] [I73.9] 09/10/2021    Stenosis of left popliteal artery (HCC) [I70.202] 09/10/2021    Right popliteal artery occlusion ([address] [I70.201] 09/10/2021    Bilateral Popliteal artery aneurysm (HCC) [I72.4] 09/10/2021         ASSESSMENT/PLAN:  68-year-old gentleman with a history of gout, PVD who presented for worsening right lower extremity intermittent claudication symptoms and had CT aortogram with runoff was done and incidentally found to have right renal mass concerning for renal cell cancer with possible local metastatic disease complicated by peripheral arterial disease with claudication and occlusion of the right popliteal artery    Pain:  -Start aspirin and statin  -Vascular consult  -Given no critical ischemia at this time, will hold off on heparin drip until hearing from vascular  -Oncology consult for renal mass  -Pain management      DVT Prophylaxis: Subcut enoxaparin  Diet: General diet  Code Status: Full code         Jennifer Granados MD    Thank you Roge Nicholson MD for the opportunity to be involved in this patient's care. If you have any questions or concerns please feel free to contact me at [phone].

## 2021-09-11 NOTE — CONSULTS
Vascular Surgery Consultation    Caitlyn Thompson  [id number]  9/11/2021 1950    Date of Admission:  9/10/2021  5:52 PM  Date of Consultation:  9/11/2021    PCP:  POOL OSBORNE [facility], MD       Chief Complaint: Right leg pain    History of Present Illness: We are asked to see this patient in consultation by the Arati Keerthi is a 73 y.o. male who presented to ED last night with right leg pain. Describes it as a shooting pain from ankle up through posterior calf when he walks, resolving with rest.  He has approx 2 week h/o worsening right leg claudication and also rest pain sx. Had appt to see Dr. Beatriz Munson at [facility] on 9/20, for this complaint. Presented to ED on 9/6, and was given Eliquis and asked to f/u the following day with a venous duplex to r/o DVt. Study was negative for DVT, but he was noted to have a 2cm R popliteal artery aneurysm. I personally reviewed images of that study, and while there is only basic imaging of the VIRGEN, it does appear open. The imaging wasn't sufficient to determine elevated velocities, thrombus, etc. He was told to keep outpatient follow up with Dr. Beatriz Munson, but presented last night to ED because of persistent pain in both the right and the back. He underwent a CTA runoff last night and was incidentally noted to have a 15cm left renal mass with bony mets. He had a CT chest today which also unfortunately shows pulmonary and hilar node mets. CT head and PET scan pending. He denies any abdominal pain or change in bowel or bladder habits. 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 Denies family h/o aneurysm. I personally reviewed images of the following studies:  CT CHEST W CONTRAST  9/11/2021  Impression   1. Pulmonary, osseous, mediastinal and hilar prem metastasis.  If it will   change clinical management, recommend further evaluation with PET-CT.      CTA ABDOMINAL AORTA W BILAT RUNOFF W CONTRAST 9/10/2021  Impression   Nonvascular-       Large, 15.2 cm left renal mass, typical of renal cell carcinoma.       There is local metastasis with an adrenal nodule and perinephric nodes.       Several left basilar pulmonary nodules measuring up to 9 mm, suspicious for   metastasis.       Suspected osteolytic metastases, involving the left inferior pubic ramus,   left ilium and left femoral diaphysis.       Vascular-       Aortoiliac inflow is preserved.  There is no abdominal aortic aneurysm.       Bilateral superficial femoral arteries are widely patent and ectatic.       Bilateral fusiform aneurysms of the popliteal arteries.  On the right, no   flow is noted in the lower popliteal or trifurcation vessels.  On the left   there is a moderate stenosis of the inferior popliteal, although there is   distal runoff via a diseased peroneal vessel. Past Medical History:  Past Medical History:   Diagnosis Date    Arthritis     De Quervain's tenosynovitis     Diverticulitis     Gout     HTN (hypertension) 9/11/2021       Past Surgical History:  Past Surgical History:   Procedure Laterality Date    HERNIA REPAIR      bilateral inguinal    HIP SURGERY      right hip replacement    HIP SURGERY  3/22/11    REMOVAL OF HARDWARE, RIGHT HIP REVISION, CEMENTED    LIVER SURGERY         Home Medications:   Prior to Admission medications    Medication Sig Start Date End Date Taking? Authorizing Provider   nabumetone (RELAFEN) 500 MG tablet Take 500 mg by mouth 2 times daily   Yes Historical Provider, MD   isosorbide mononitrate (IMDUR) 30 MG extended release tablet Take 30 mg by mouth daily   Yes Historical Provider, MD   HYDROcodone-acetaminophen (NORCO) 5-325 MG per tablet Take 1 tablet by mouth every 6 hours as needed for Pain for up to 5 days. 9/6/21 9/11/21 Yes SEAN Jama   MULTIPLE VITAMIN PO Take 1 tablet by mouth daily.  3/18/11  Yes Historical Provider, MD        Facility Administered Medications:   Renny Hodges isosorbide mononitrate  30 mg Oral Daily    meloxicam  15 mg Oral Daily    sodium chloride flush  5-40 mL IntraVENous 2 times per day    enoxaparin  40 mg SubCUTAneous Daily    aspirin  81 mg Oral Daily    atorvastatin  40 mg Oral Nightly       Allergies:  Patient has no known allergies. Social History:      Social History     Socioeconomic History    Marital status:      Spouse name: Not on file    Number of children: Not on file    Years of education: Not on file    Highest education level: Not on file   Occupational History    Occupation: [address]    Tobacco Use    Smoking status: Current Every Day Smoker     Years: 20.00     Types: Cigars    Smokeless tobacco: Never Used    Tobacco comment: smokes 5 cigars/day   Substance and Sexual Activity    Alcohol use: No     Comment: former    Drug use: No    Sexual activity: Not on file   Other Topics Concern    Not on file   Social History Narrative    Not on file     Social Determinants of Health     Financial Resource Strain:     Difficulty of Paying Living Expenses:    Food Insecurity:     Worried About [employer] [employer] in the Last Year:     [employer] in the Last Year:    Transportation Needs:     Lack of Transportation (Medical):  Lack of Transportation (Non-Medical):    Physical Activity:     Days of Exercise per Week:     Minutes of Exercise per Session:    Stress:     Feeling of Stress :    Social Connections:     Frequency of Communication with Friends and Family:     Frequency of Social Gatherings with Friends and Family:     Attends Catholic Services:     Active Member of Clubs or Organizations:     Attends Club or Organization Meetings:     Marital Status:    Intimate Partner Violence:     Fear of Current or Ex-Partner:     Emotionally Abused:     Physically Abused:     Sexually Abused:      Review of Systems:  Pertinent positive and negative items are noted in the HPI.   A comprehensive review of all other symptoms is otherwise negative. Physical Examination:    /64   Pulse 69   Temp 98 °F (36.7 °C) (Oral)   Resp 16   Ht 6' 2\" (1.88 m)   Wt 168 lb 3.4 oz (76.3 kg)   SpO2 95%   BMI 21.60 kg/m²        Admission Weight: 179 lb (81.2 kg)     General appearance: AAO x3. NAD. Very pleasant, well-groomed. WD/WN  Eyes: PERRLA, no jaundice  Neck: supple, no masses, no JVD. Carotids 2+ w/o bruit  Respiratory: CTA B no w/r/r  Cardiovascular: RRR no m/r/g  Pulses:    carotid brachial radial femoral popliteal DP PT   RIGHT 2 2 2 2 Prominent 3 2 1   LEFT 2 2 2 2 Prominent 2 2 2     GI: soft, large firm fullness in left mid and lower abdomen consistent with mass, non-tender, no r/g, normal BS  Musculoskeletal: normal range of motion, no joint swelling, deformity or tenderness; wiggles toes normally. Normal brisk cap refill. No cyanosis or discoloration. Temp is warm and equal bilaterally  Neuro/psychiatric: CN II - XII grossly intact. Labs:   CBC:   Recent Labs     09/10/21  1821 09/11/21  0523   WBC 6.5 5.1   HGB 8.1* 8.0*   HCT 24.5* 24.2*   MCV 70.0* 68.8*    384     BMP:   Recent Labs     09/10/21  1821 09/11/21  0523    137   K 4.7 4.2   CL 99 101   CO2 27 27   BUN 14 12   CREATININE 0.7* 0.7*   CALCIUM 10.3 10.4     Cardiac Enzymes: No results for input(s): CKTOTAL, CKMB, CKMBINDEX, TROPONINI in the last 72 hours. PT/INR:   Recent Labs     09/10/21  1821   PROTIME 16.4*   INR 1.43*     APTT:   Recent Labs     09/10/21  1821   APTT 35.1         Diagnosis/Plan:    1. Bilateral popliteal artery aneurysm with right leg claudication   --VIRGEN and runoff appear PATENT on exam.  This is in contrast to the CTA which suggested distal occlusion bilaterally. This suggests poor timing of the contrast on CTA because he certainly would not have palpable pedal pulses if he had findings suggested by CTA. I do NOT believe the CTA findings are accurate.   Will check bilateral arterial

## 2021-09-11 NOTE — ACP (ADVANCE CARE PLANNING)
Advanced Care Planning Note. Purpose of Encounter: Advanced care planning in light of L renal mass  Parties In Attendance: Patient  Decisional Capacity: Yes  Subjective: Patient with pain in R leg  Objective: Cr 0.7  Goals of Care Determination: Patient wants full support (CPR, vent, surgery, HD, trach, PEG)  Plan:  ASA, Lipitor, Oncology and Vascular consults  Code Status: Full code  Time spent on Advanced care Plannin minutes  Advanced Care Planning Documents: Completed advanced directives on chart, wife is the POA.     John Love MD  2021 5:17 AM

## 2021-09-11 NOTE — FLOWSHEET NOTE
09/11/21 1255   Encounter Summary   Services provided to: Patient not available   Continue Visiting   (AD docs left in pt's room w/contact info. GB 9/11)   Length of Encounter 15 minutes     Electronically signed by Kamila Duff on 9/11/2021 at 12:55 PM    Advance Care Planning     Advance Care Planning Inpatient Note  [facility] Department    Today's Date: 9/11/2021  Unit: [facility] 3A NURSING    Received request from IDT Member. Upon review of chart and communication with care team, patient's decision making abilities are not in question. .     Assessment:   attempted to visit w/pt to assess AD needs. Pt not in room at the time of attempted visit.  left AD paperwork w/contact information should pt wish to discuss/complete AD documentation while in-patient. Interventions:  AD documentation given.      Care Preferences Communicated:   No    Outcomes/Plan:  ACP Discussion: Postponed    Electronically signed by Chaplain Que on 9/11/2021 at 12:55 PM

## 2021-09-11 NOTE — ED PROVIDER NOTES
[facility] Emergency Department      Pt Name: Anshul Valencia  MRN: [id number]  [place] 1950  Date of evaluation: 9/10/2021  Provider: Tootie Barrios MD  I independently performed a history and physical on Anshul Valencia. All diagnostic, treatment, and disposition decisions were made by myself in conjunction with the advanced practice provider. HPI: Anshul Valencia presented with   Chief Complaint   Patient presents with    Leg Pain     Pt diagnosed with popliteal artery aneurysm. Follow up with vascular surgery in 2 week, but \"pain is so bad\"      Anshul Valencia has a past medical history of Arthritis, De Quervain's tenosynovitis, Diverticulitis, Gout, and HTN (hypertension) (9/11/2021). He has a past surgical history that includes hip surgery; Liver surgery; hernia repair; and hip surgery (3/22/11). No current facility-administered medications on file prior to encounter. Current Outpatient Medications on File Prior to Encounter   Medication Sig Dispense Refill    nabumetone (RELAFEN) 500 MG tablet Take 500 mg by mouth 2 times daily      isosorbide mononitrate (IMDUR) 30 MG extended release tablet Take 30 mg by mouth daily      meloxicam (MOBIC) 15 MG tablet Take 15 mg by mouth daily      HYDROcodone-acetaminophen (NORCO) 5-325 MG per tablet Take 1 tablet by mouth every 6 hours as needed for Pain for up to 5 days. 10 tablet 0    MULTIPLE VITAMIN PO Take 1 tablet by mouth daily.        PHYSICAL EXAM  Vitals: /71   Pulse 79   Temp 98.3 °F (36.8 °C) (Oral)   Resp 16   Ht 6' 2\" (1.88 m)   Wt 179 lb (81.2 kg)   SpO2 94%   BMI 22.98 kg/m²   Constitutional:  73 y.o. male alert, cooperative  HENT:  Atraumatic, mucous membranes moist  Eyes:   Conjunctiva clear, no icterus  Neck:  Supple, no visible JVD, no signs of injury  Cardiovascular:  Regular, no rubs  Thorax & Lungs:  No accessory muscle usage, clear  Abdomen:  Soft, non distended, NT  Back:  No deformity  Genitalia: Deferred  Rectal:  Deferred  Extremities:  No cyanosis, no edema, pedal pulses are palpable, cap refill normal  Skin:  Warm, dry  Neurologic:  Alert, no slurred speech  Psychiatric:  Affect appropriate      Medical Decision Making and Plan: Briefly, this is an 73 y.o.male who presented with concern for leg and back pain, worsening over time. Recently diagnosed with popliteal artery aneurysm, noted on outpatient ultrasound. CT findings of 15 cm renal mass suggestive of renal cell carcinoma with metastatic findings to his spine. Patient also has vascular abnormalities as well though with clinical distal perfusion. He has intractable pain, mostly to his lower back. I discussed the case in full with the admitting physician. I consulted with Dr Mary Carrillo about the patient's exam and abnormalities of vascular findings on CT imaging. She will consult as an inpatient. For further details of [facility] Emergency Department encounter, please see documentation by advanced practice provider SEAN Daniels.      Labs Reviewed   CBC WITH AUTO DIFFERENTIAL - Abnormal; Notable for the following components:       Result Value    RBC 3.50 (*)     Hemoglobin 8.1 (*)     Hematocrit 24.5 (*)     MCV 70.0 (*)     MCH 23.0 (*)     Lymphocytes Absolute 0.9 (*)     All other components within normal limits    Narrative:     Performed at:  [facility] [address], [place], [address]   Phone [phone]   COMPREHENSIVE METABOLIC PANEL - Abnormal; Notable for the following components:    Glucose 132 (*)     CREATININE 0.7 (*)     Albumin/Globulin Ratio 0.8 (*)     ALT <5 (*)     AST 7 (*)     All other components within normal limits    Narrative:     Performed at:  [facility]  [facility], [address] [place] [address]   Phone [phone]   PROTIME-INR - Abnormal; Notable for the following components:    Protime 16.4 (*)     INR 1.43 (*)     All other components within normal limits    Narrative:     Performed at:  [facility]  [address],  [place], [address]   Phone [phone]   APTT    Narrative:     Performed at:  [facility]  [address],  [place], [address]   Phone [phone]   LIPID PANEL   HEMOGLOBIN A1C     RADIOLOGY:     CTA ABDOMINAL AORTA W BILAT RUNOFF W CONTRAST   Final Result   Nonvascular-      Large, 15.2 cm left renal mass, typical of renal cell carcinoma. There is local metastasis with an adrenal nodule and perinephric nodes. Several left basilar pulmonary nodules measuring up to 9 mm, suspicious for   metastasis. Suspected osteolytic metastases, involving the left inferior pubic ramus,   left ilium and left femoral diaphysis. Vascular-      Aortoiliac inflow is preserved. There is no abdominal aortic aneurysm. Bilateral superficial femoral arteries are widely patent and ectatic. Bilateral fusiform aneurysms of the popliteal arteries. On the right, no   flow is noted in the lower popliteal or trifurcation vessels. On the left   there is a moderate stenosis of the inferior popliteal, although there is   distal runoff via a diseased peroneal vessel. CRITICAL CARE:  Total critical care time of 31 minutes (excludes any time for procedures). This includes but not limited to vital sign monitoring, medication, clinical response to medications, interpretation of diagnostics, review of nursing notes, pertinent record review, discussions about patient condition, consultation time, documentation time. Critical care due to the patient's abnormalities of imaging, concern for possible vascular occlusion. FINAL IMPRESSION:    1. Left kidney mass    2. Intractable pain    3. Peripheral vascular disease ([address]    4.  Anemia, unspecified type    5. concern for vascular occlusion       DISPOSITION Admitted 09/10/2021 10:14:15 PM Wesley Morelos MD  09/11/21 1123

## 2021-09-11 NOTE — CONSULTS
HEMATOLOGY/ONCOLOGY CONSULTATION:     9/11/2021 8:50 AM    REASON FOR CONSULT: cancer    PROVIDERS:  Maura Villeda MD    CHIEF COMPLAINT:     Chief Complaint   Patient presents with    Leg Pain     Pt diagnosed with popliteal artery aneurysm. Follow up with vascular surgery in 2 week, but \"pain is so bad\"        HISTORY OF PRESENT ILLNESS:     Mr. Yari Varghese is 73 yr  male with history of hypertension, gout who developed pain radiating down his right calf posteriorly over the past 7-10 days. He went to [facility] ED,  Xray of right tibia/fibula was unremarkable. Venous doppler study 9/7 showed no thrombosis, but incidental finding of 2 cm right popliteal aneurysm. He says he has appointment to see Dr. Radha Lund in 2 weeks. However, pain got so bad that he came to ED. CT was done which showed large left renal mass, adenopathy, small subcm lung nodules at bases, lytic bone lesions involving left inferior pubic ramus, left ilium, left femoral diaphysis, but no significant bone lesions on right. He has moderate left popliteal artery stenosis, bilateral aneurysms of popliteal arteries. He denies any dyspnea, chest pain, hematuria, headache, weight loss. However, he admits that he has developed some discomfort in his left flank, but thought this was due to his gait change favoring his right leg. Pain in his right calf is almost continuous, not exercise related, not associated with cyanosis, NOT work with walking. He has no headache, visual changes, focal weakness, or numbness, or speech difficulties. He has no hematuria.     PAST MEDICAL HISTORY:     Past Medical History:   Diagnosis Date    Arthritis     De Quervain's tenosynovitis     Diverticulitis     Gout     HTN (hypertension) 9/11/2021       PAST SURGICAL HISTORY:        Past Surgical History:   Procedure Laterality Date    HERNIA REPAIR      bilateral inguinal    HIP SURGERY      right hip replacement    HIP SURGERY  3/22/11    REMOVAL OF HARDWARE, RIGHT HIP REVISION, CEMENTED    LIVER SURGERY         SOCIAL HISTORY:     Social History     Socioeconomic History    Marital status:      Spouse name: Not on file    Number of children: Not on file    Years of education: Not on file    Highest education level: Not on file   Occupational History    Occupation: [address]    Tobacco Use    Smoking status: Current Every Day Smoker     Years: 20.00     Types: Cigars    Smokeless tobacco: Never Used    Tobacco comment: smokes 5 cigars/day   Substance and Sexual Activity    Alcohol use: No     Comment: former    Drug use: No    Sexual activity: Not on file   Other Topics Concern    Not on file   Social History Narrative    Not on file     Social Determinants of Health     Financial Resource Strain:     Difficulty of Paying Living Expenses:    Food Insecurity:     Worried About [address] [employer] [employer] in the Last Year:     [address] in the Last Year:    Transportation Needs:     Lack of Transportation (Medical):  Lack of Transportation (Non-Medical):    Physical Activity:     Days of Exercise per Week:     Minutes of Exercise per Session:    Stress:     Feeling of Stress :    Social Connections:     Frequency of Communication with Friends and Family:     Frequency of Social Gatherings with Friends and Family:     Attends Jewish Services:     Active Member of Clubs or Organizations:     Attends Club or Organization Meetings:     Marital Status:    Intimate Partner Violence:     Fear of Current or Ex-Partner:     Emotionally Abused:     Physically Abused:     Sexually Abused:        FAMILY HISTORY:     Family History   Problem Relation Age of Onset    Cancer Mother         lung    Heart Disease Maternal Grandmother        ALLERGIES:     Allergies as of 09/10/2021    (No Known Allergies)       MEDICATIONS:     No current facility-administered medications on file prior to encounter.      Current Outpatient Medications on File Prior to Encounter   Medication Sig Dispense Refill    nabumetone (RELAFEN) 500 MG tablet Take 500 mg by mouth 2 times daily      isosorbide mononitrate (IMDUR) 30 MG extended release tablet Take 30 mg by mouth daily      HYDROcodone-acetaminophen (NORCO) 5-325 MG per tablet Take 1 tablet by mouth every 6 hours as needed for Pain for up to 5 days. 10 tablet 0    MULTIPLE VITAMIN PO Take 1 tablet by mouth daily. REVIEW OF SYSTEMS:      · Constitutional: Denies fever, sweats, weight loss  · Eyes: No visual changes or diplopia. No scleral icterus. · ENT: No Headaches, hearing loss or vertigo. No mouth sores or sore throat. · Cardiovascular: No chest pain, dyspnea on exertion, palpitations or loss of consciousness. · Respiratory: No cough or wheezing, no sputum production. No hemoptysis. .  · Gastrointestinal: No abdominal pain, appetite loss, blood in stools. No change in bowel habits. · Genitourinary: No dysuria, trouble voiding, or hematuria. · Musculoskeletal: See above  · Integumentary: No rash or pruritis. · Neurological: No headache, diplopia. No change in gait, balance, or coordination. No paresthesias. · Endocrine: No temperature intolerance. No excessive thirst, fluid intake, or urination. · Hematologic/Lymphatic: No abnormal bruising or ecchymoses, blood clots or swollen lymph nodes. · Allergic/Immunologic: No nasal congestion or hives. ·     PHYSICAL EXAM:       /64   Pulse 69   Temp 98 °F (36.7 °C) (Oral)   Resp 16   Ht 6' 2\" (1.88 m)   Wt 168 lb 3.4 oz (76.3 kg)   SpO2 95%   BMI 21.60 kg/m²     General appearance: Alert and cooperative. Appears stated age. HEENT: Normocephalic, without obvious abnormality, atraumatic. No scleral icterus. Normal mucus membranes. Normal mouth exam  Neck: No JVD. Lymph Nodes: No palpable lymph nodes in the cervical, supraclavicular, axillary areas. Lungs: Clear to auscultation. No wheezes, rales or rhonchi are heard.   Heart: Regular rate and rhythm, S1, S2 normal  Abdomen: Palpable mass in left lateral abdomen, spanning 10 cm in length, nontender. Soft, non-tender; bowel sounds normal;  Liver and spleen are not palpable. Extremities: without cyanosis, clubbing, edema or asymmetry  Skin: No jaundice, purpura or petechiae  Neuro:  Alert and oriented. Speech is normal.  Gait is normal.  Mentation is normal.  Patient is able to carry a complex conversation regarding their illness. Musculoskeletal: no percussion tenderness  Psychiatric: Normal      LABS:     Lab Results   Component Value Date    WBC 5.1 09/11/2021    HGB 8.0 (L) 09/11/2021    HCT 24.2 (L) 09/11/2021    MCV 68.8 (L) 09/11/2021     09/11/2021       Lab Results   Component Value Date    GLUCOSE 104 (H) 09/11/2021    BUN 12 09/11/2021    CREATININE 0.7 (L) 09/11/2021    K 4.2 09/11/2021       Lab Results   Component Value Date    ALKPHOS 85 09/10/2021    AST 7 (L) 09/10/2021         IMAGING:     CTA ABDOMINAL AORTA W BILAT RUNOFF W CONTRAST [[id number]] Collected: 09/10/21 2029      Order Status: Completed Updated: 09/10/21 2053     Narrative:       EXAMINATION:   CTA OF THE AORTA WITH LOWER EXTREMITY RUNOFF 9/10/2021 7:03 pm     TECHNIQUE:   CTA of the pelvis and bilateral lower extremities was performed after the   administration of intravenous contrast.   Multiplanar reformatted images are   provided for review.  MIP images are provided for review. Dose modulation,   iterative reconstruction, and/or weight based adjustment of the mA/kV was   utilized to reduce the radiation dose to as low as reasonably achievable.      COMPARISON:   05/07/2007     HISTORY:   ORDERING SYSTEM PROVIDED HISTORY: ? popliteal anyeursym   TECHNOLOGIST PROVIDED HISTORY:   Reason for exam:->? popliteal anyeursym   Decision Support Exception - unselect if not a suspected or confirmed   emergency medical condition->Emergency Medical Condition (MA)   Reason for Exam: ? p opliteal anyeursym Acuity: Acute   Type of Exam: Initial   Relevant Medical/Surgical History: Leg Pain (Pt diagnosed with popliteal   artery aneurysm. Follow up with vascular surgery in 2 week, but \"pain is so   bad\" )     FINDINGS:     Nonvascular     Lower Chest: There are several nodules in the left lower lobe, measuring up   to 9 mm (image 11, 21 and 25). Organs: There is mild diffuse fatty infiltration of the liver.  The   gallbladder and pancreas are unremarkable.  Right adrenal gland is   unremarkable.  Right renal cysts are simple and measure up to 2.9 cm. There is an interpolar, large, hypervascular mass of the left kidney   measuring 15.2 x 14.7 x 10.1 cm.  There is a left adrenal mass measuring 4.2   cm in diameter. GI/Bowel: The stomach, small bowel and colon are unremarkable. Pelvis: Urinary bladder is unremarkable. John Sofia is mild-to-moderate   enlargement of the prostate gland. Peritoneum/Retroperitoneum: Left perinephric nodes measure up to 1.2 cm. Bones/Soft Tissues: There is an osteolytic lesion in the left inferior pubic   ramus, with erosion of the cortex and hypervascular soft tissue component. There is a lytic focus in the central left ilium versus focal osteopenia   (image 118).  There is posterior attenuation of the cortex of the mid femoral   shaft (image 230).  Right hip replacement is in normal position. VASCULAR     The visualized heart is unremarkable. The celiac trunk, superior mesenteric artery and inferior mesenteric artery   are widely patent. Renal arteries are widely patent. There is mild aortoiliac calcification, without significant stenosis. The common femoral arteries are widely patent.  The superficial femoral   arteries are irregular and ectatic with moderate calcification. There are fusiform aneurysms of the popliteal arteries bilaterally, measuring   in diameter 23 mm on the right and 21 mm on the left.      On the right side, contrast ceases at the infrageniculate popliteal.  There   is no opacification of the trifurcation vessels which are moderately   calcified. On the left side there is moderate stenosis of the infrageniculate popliteal,   with contiguous flow.  The proximal peroneal and anterior tibial arteries are   formed, but taper at the proximal tibia.  A posterior tibial is not opacified   at all.  There is primary runoff via the diseased peroneal.      Impression:       Nonvascular-     Large, 15.2 cm left renal mass, typical of renal cell carcinoma. There is local metastasis with an adrenal nodule and perinephric nodes. Several left basilar pulmonary nodules measuring up to 9 mm, suspicious for   metastasis. Suspected osteolytic metastases, involving the left inferior pubic ramus,   left ilium and left femoral diaphysis. Vascular-     Aortoiliac inflow is preserved.  There is no abdominal aortic aneurysm. Bilateral superficial femoral arteries are widely patent and ectatic.      Bilateral fusiform aneurysms of the popliteal arteries.  On the right, no   flow is noted in the lower popliteal or trifurcation vessels.  On the left   there is a moderate stenosis of the inferior popliteal, although there is   distal runoff via a diseased peroneal vessel.          Venous doppler 9/8/2021  Conclusions        Summary        No evidence of deep vein or superficial vein thrombosis involving the right    lower extremity.        Incidental finding on the right: popliteal aneurysm (2 cm in size)        Signature        ------------------------------------------------------------------    Electronically signed by Mirella Meza MD (Interpreting   Halle Panda) on 09/08/2021 at 04:52 PM         ASSESSMENT:     Problem List Items Addressed This Visit     Anemia      Other Visit Diagnoses     Left kidney mass    -  Primary    Intractable pain        Peripheral vascular disease ([facility] [address]        Vascular occlusion            Large left renal mass with adenopathy, lytic bone lesions, possible subcm lung lesions are highly suspicious for metastatic disease, primary kidney. Anemia is likely due to bone marrow involvement, but need to rule out other possible causes        PLAN:     1. Discussed with patient that his right leg pain is worrisome for likely referred pain maybe from his back or pelvic area, rather than popliteal aneurysm. He has no associated vascular ischemic changes, and his pain is constant, not exercise related which would be more associated with vascular disease. Popliteal aneurysm is more likely an incidental finding. 2.  Discussed large left renal mass, lytic bone lesions, adenopathy is very worrisome for metastatic kidney cancer. 3.  Anemia is high likely related to bone/bone marrow involvement since he gives no history of blood loss. 4.  Ordered bone scan, CT chest, MRI brain for staging purposes. Might need MRI of either his spine or pelvis to evaluate his right leg pain, and likely would benefit from radiation, depending on where origin of his pain comes from. 5.  Ordered CT guided biopsy of renal mass.   6.  Await iron, B12, folate studies already ordered, added retic/LDH/Princess to assess rbc production (suspect retic will be low) and rule out hemolysis     Lovely Ness MD

## 2021-09-11 NOTE — PROGRESS NOTES
Hospitalist Progress Note      PCP: Hua Holm MD    Date of Admission: 9/10/2021    Chief Complaint: R leg pain    Hospital Course:  61-year-old gentleman with a history of gout, PVD, HTN, tobacco abuse who presented for worsening right lower extremity intermittent claudication symptoms and had CT aortogram with runoff was done and incidentally found to have right renal mass concerning for renal cell cancer with possible local metastatic disease complicated by peripheral arterial disease with claudication and occlusion of the right popliteal artery. Admitted as inpatient for further management. Started on ASA and stain. Oncology and Vascular consulted to evaluate. Subjective:  Patient with pain in R leg. No CP, SOB, HA or fevers. Works at [employer]. Medications:  Reviewed    Infusion Medications    sodium chloride       Scheduled Medications    isosorbide mononitrate  30 mg Oral Daily    meloxicam  15 mg Oral Daily    sodium chloride flush  5-40 mL IntraVENous 2 times per day    enoxaparin  40 mg SubCUTAneous Daily    aspirin  81 mg Oral Daily    atorvastatin  40 mg Oral Nightly     PRN Meds: HYDROmorphone, HYDROcodone-acetaminophen, sodium chloride flush, sodium chloride, ondansetron **OR** ondansetron, polyethylene glycol, acetaminophen **OR** acetaminophen    No intake or output data in the 24 hours ending 09/11/21 0513    Physical Exam Performed:    /63   Pulse 73   Temp 98 °F (36.7 °C) (Oral)   Resp 16   Ht 6' 2\" (1.88 m)   Wt 168 lb 3.4 oz (76.3 kg)   SpO2 94%   BMI 21.60 kg/m²     General appearance: No apparent distress, appears stated age and cooperative. HEENT: Pupils equal, round, and reactive to light. Conjunctivae/corneas clear. Neck: Supple, with full range of motion. No jugular venous distention. Trachea midline. Respiratory:  Normal respiratory effort. Clear to auscultation, bilaterally without Rales/Wheezes/Rhonchi.   Cardiovascular: Regular rate and rhythm with normal S1/S2 without murmurs, rubs or gallops. Abdomen: Soft, non-tender, non-distended with normal bowel sounds. Mass palpable in L abdomen. Musculoskeletal: No clubbing, cyanosis or edema bilaterally. Full range of motion without deformity. Skin: Skin color, texture, turgor normal.  No rashes or lesions. Neurologic:  Neurovascularly intact without any focal sensory/motor deficits. Cranial nerves: II-XII intact, grossly non-focal.  Psychiatric: Alert and oriented, thought content appropriate, normal insight  Capillary Refill: Brisk,3 seconds, normal   Peripheral Pulses: +2 palpable, equal bilaterally       Labs:   Recent Labs     09/10/21  1821   WBC 6.5   HGB 8.1*   HCT 24.5*        Recent Labs     09/10/21  1821      K 4.7   CL 99   CO2 27   BUN 14   CREATININE 0.7*   CALCIUM 10.3     Recent Labs     09/10/21  1821   AST 7*   ALT <5*   BILITOT 0.3   ALKPHOS 85     Recent Labs     09/10/21  1821   INR 1.43*     No results for input(s): Ty Martini in the last 72 hours. Urinalysis:      Lab Results   Component Value Date    NITRU NEGATIVE 03/18/2011    WBCUA 0-3 03/18/2011    RBCUA 0-2 03/18/2011    BLOODU NEGATIVE 03/18/2011    SPECGRAV 1.010 03/18/2011    GLUCOSEU NEGATIVE 03/18/2011       Radiology:  CTA ABDOMINAL AORTA W BILAT RUNOFF W CONTRAST   Final Result   Nonvascular-      Large, 15.2 cm left renal mass, typical of renal cell carcinoma. There is local metastasis with an adrenal nodule and perinephric nodes. Several left basilar pulmonary nodules measuring up to 9 mm, suspicious for   metastasis. Suspected osteolytic metastases, involving the left inferior pubic ramus,   left ilium and left femoral diaphysis. Vascular-      Aortoiliac inflow is preserved. There is no abdominal aortic aneurysm. Bilateral superficial femoral arteries are widely patent and ectatic. Bilateral fusiform aneurysms of the popliteal arteries.   On the right, no flow is noted in the lower popliteal or trifurcation vessels. On the left   there is a moderate stenosis of the inferior popliteal, although there is   distal runoff via a diseased peroneal vessel. Assessment/Plan:    Active Hospital Problems    Diagnosis     Left renal mass [N28.89]     Peripheral arterial disease ([address] [I73.9]     Stenosis of left popliteal artery (HCC) [I70.202]     Right popliteal artery occlusion ([address] [I70.201]     Bilateral Popliteal artery aneurysm (HCC) [I72.4]      1. Consider anticoagulation per Vascular  2. On ASA and Lipitor for PAD  3. Oncology consult for renal mass   4. Vascular consult for PAD  5. Continue Imdur    DVT Prophylaxis: Lovenox  Diet: ADULT DIET; Regular  Code Status: Full Code    PT/OT Eval Status: Not needed    Dispo - Home    Discussed with patient and nursing. This looks to be metastatic RCC. Patient is aware. Will need tissue to confirm. Oncology can proceed with treatment as outpatient once we have tissue.     Arianne Jacob MD

## 2021-09-12 NOTE — PLAN OF CARE
Problem: Pain:  Goal: Pain level will decrease  Description: Pain level will decrease  Outcome: Ongoing  Goal: Control of acute pain  Description: Control of acute pain  Outcome: Ongoing  Goal: Control of chronic pain  Description: Control of chronic pain  Outcome: Ongoing     Problem: Falls - Risk of:  Goal: Will remain free from falls  Description: Will remain free from falls  Outcome: Ongoing  Goal: Absence of physical injury  Description: Absence of physical injury  Outcome: Ongoing     Problem: Tissue Perfusion - Renal, Altered:  Goal: Electrolytes within specified parameters  Description: Electrolytes within specified parameters  Outcome: Ongoing  Goal: Urine creatinine clearance will be within specified parameters  Description: Urine creatinine clearance will be within specified parameters  Outcome: Ongoing  Goal: Serum creatinine will be within specified parameters  Description: Serum creatinine will be within specified parameters  Outcome: Ongoing  Goal: Ability to achieve a balanced intake and output will improve  Description: Ability to achieve a balanced intake and output will improve  Outcome: Ongoing

## 2021-09-12 NOTE — PROGRESS NOTES
Physical/Occupational Therapy  Discharge Summary  Florecita Goldberg    Attempted to see pt for PT/OT evaluation. Explained role of acute PT/OT. Pt declined need for therapy service at this time, stating independence with mobility and ADLs. Discussed with nursing who confirmed independence. Will discharge acute PT/OT at this time.  Thanks, Nicki Orozco [place], DPT [id number]; Angel Feliciano OTR/L, MOT #[id number]

## 2021-09-12 NOTE — PROGRESS NOTES
Hospitalist Progress Note      PCP: Vero Arteaga MD    Date of Admission: 9/10/2021    Chief Complaint: R leg pain    Hospital Course:  42-year-old gentleman with a history of gout, PVD, HTN, tobacco abuse who presented for worsening right lower extremity intermittent claudication symptoms and had CT aortogram with runoff was done and incidentally found to have right renal mass concerning for renal cell cancer with possible local metastatic disease complicated by peripheral arterial disease with claudication and occlusion of the right popliteal artery. Admitted as inpatient for further management. Started on ASA and stain. Oncology and Vascular consulted to evaluate. CT Chest with:    1. Pulmonary, osseous, mediastinal and hilar prem metastasis.  If it will   change clinical management, recommend further evaluation with PET-CT. MRI Brain:    No acute intracranial abnormality.  No mass effect or abnormal intracranial   enhancement to suggest intracranial metastasis.       Minimal chronic microvascular disease.       Minimal parenchymal volume loss.       Moderate bilateral mastoid effusions. For Bone scan and MRI L spine. For CT guided kidney biopsy on 9/13. Started on IVF for SYL on 9/12.  ? JOSETTE. Subjective:  Patient with pain in R leg. No CP, SOB, HA or fevers. Works at [employer].       Medications:  Reviewed    Infusion Medications    heparin (PORCINE) Infusion 24 Units/kg/hr (09/12/21 1444)    sodium chloride       Scheduled Medications    LORazepam  1 mg IntraVENous Once    polyethylene glycol  17 g Oral BID    isosorbide mononitrate  30 mg Oral Daily    meloxicam  15 mg Oral Daily    sodium chloride flush  5-40 mL IntraVENous 2 times per day    atorvastatin  40 mg Oral Nightly     PRN Meds: HYDROmorphone, heparin (porcine), heparin (porcine), sodium chloride flush, HYDROcodone-acetaminophen, sodium chloride flush, sodium chloride, ondansetron **OR** ondansetron, polyethylene glycol, acetaminophen **OR** acetaminophen      Intake/Output Summary (Last 24 hours) at 9/12/2021 1758  Last data filed at 9/12/2021 0814  Gross per 24 hour   Intake --   Output 200 ml   Net -200 ml       Physical Exam Performed:    BP (!) 98/59   Pulse 67   Temp 98.6 °F (37 °C) (Oral)   Resp 16   Ht 6' 2\" (1.88 m)   Wt 168 lb 3.4 oz (76.3 kg)   SpO2 94%   BMI 21.60 kg/m²     General appearance: No apparent distress, appears stated age and cooperative. HEENT: Pupils equal, round, and reactive to light. Conjunctivae/corneas clear. Neck: Supple, with full range of motion. No jugular venous distention. Trachea midline. Respiratory:  Normal respiratory effort. Clear to auscultation, bilaterally without Rales/Wheezes/Rhonchi. Cardiovascular: Regular rate and rhythm with normal S1/S2 without murmurs, rubs or gallops. Abdomen: Soft, non-tender, non-distended with normal bowel sounds. Mass palpable in L abdomen. Musculoskeletal: No clubbing, cyanosis or edema bilaterally. Full range of motion without deformity. Skin: Skin color, texture, turgor normal.  No rashes or lesions. Neurologic:  Neurovascularly intact without any focal sensory/motor deficits.  Cranial nerves: II-XII intact, grossly non-focal.  Psychiatric: Alert and oriented, thought content appropriate, normal insight  Capillary Refill: Brisk,3 seconds, normal   Peripheral Pulses: +2 palpable, equal bilaterally       Labs:   Recent Labs     09/11/21  0523 09/11/21  1301 09/12/21  0702   WBC 5.1 6.9 5.1   HGB 8.0* 8.3* 7.7*   HCT 24.2* 25.6* 23.7*    442 393     Recent Labs     09/10/21  1821 09/11/21  0523 09/12/21  0702    137 132*   K 4.7 4.2 4.8   CL 99 101 97*   CO2 27 27 27   BUN 14 12 23*   CREATININE 0.7* 0.7* 1.3   CALCIUM 10.3 10.4 10.3     Recent Labs     09/10/21  1821   AST 7*   ALT <5*   BILITOT 0.3   ALKPHOS 85     Recent Labs     09/10/21  1821   INR 1.43*     No results for input(s): Brianna Comment in the last 72 hours. Urinalysis:      Lab Results   Component Value Date    NITRU NEGATIVE 03/18/2011    WBCUA 0-3 03/18/2011    RBCUA 0-2 03/18/2011    BLOODU NEGATIVE 03/18/2011    SPECGRAV 1.010 03/18/2011    GLUCOSEU NEGATIVE 03/18/2011       Radiology:  MRI BRAIN W WO CONTRAST   Final Result   No acute intracranial abnormality. No mass effect or abnormal intracranial   enhancement to suggest intracranial metastasis. Minimal chronic microvascular disease. Minimal parenchymal volume loss. Moderate bilateral mastoid effusions. CT CHEST W CONTRAST   Final Result   1. Pulmonary, osseous, mediastinal and hilar prem metastasis. If it will   change clinical management, recommend further evaluation with PET-CT. CTA ABDOMINAL AORTA W BILAT RUNOFF W CONTRAST   Final Result   Nonvascular-      Large, 15.2 cm left renal mass, typical of renal cell carcinoma. There is local metastasis with an adrenal nodule and perinephric nodes. Several left basilar pulmonary nodules measuring up to 9 mm, suspicious for   metastasis. Suspected osteolytic metastases, involving the left inferior pubic ramus,   left ilium and left femoral diaphysis. Vascular-      Aortoiliac inflow is preserved. There is no abdominal aortic aneurysm. Bilateral superficial femoral arteries are widely patent and ectatic. Bilateral fusiform aneurysms of the popliteal arteries. On the right, no   flow is noted in the lower popliteal or trifurcation vessels. On the left   there is a moderate stenosis of the inferior popliteal, although there is   distal runoff via a diseased peroneal vessel.          NM BONE SCAN WHOLE BODY    (Results Pending)   CT BIOPSY RENAL    (Results Pending)   VL DUP LOWER EXTREMITY ARTERIES BILATERAL    (Results Pending)   MRI LUMBAR SPINE W CONTRAST    (Results Pending)           Assessment/Plan:    Active Hospital Problems    Diagnosis     Anemia [D64.9]     HTN (hypertension) [I10]     Left renal mass [N28.89]     Peripheral arterial disease ([address] [I73.9]     Stenosis of left popliteal artery (HCC) [I70.202]     Right popliteal artery occlusion ([address] [I70.201]     Bilateral Popliteal artery aneurysm (HCC) [I72.4]      1. Hold Hep gtt at [place] for CT guided bx of kidney  2. On ASA and Lipitor for PAD  3. Oncology consult for renal mass appreciated  4. Vascular consult for PAD appreciated  5. Continue Imdur  6. F/U MRI L spine  7. IR CT guided bx of kidney tomorrow  8. F/U NM Bone scan  9. IVF for SYL, ? JOSETTE    DVT Prophylaxis: Lovenox  Diet: ADULT DIET; Regular  Diet NPO  Code Status: Full Code    PT/OT Eval Status: Not needed    Dispo - Home    Discussed with patient and nursing. I discussed findings with patient. Will complete work up and discharge to home. Unclear if all can be accomplished tomorrow. Monitor Cr closely. This may be JOSETTE.     Jonathan Mas MD

## 2021-09-12 NOTE — ACP (ADVANCE CARE PLANNING)
Advanced Care Planning Note. Purpose of Encounter: Advanced care planning in light of L renal mass  Parties In Attendance: Patient  Decisional Capacity: Yes  Subjective: Patient with pain in R leg  Objective: Cr 1.3  Goals of Care Determination: Patient wants full support (CPR, vent, surgery, HD, trach, PEG)  Plan:  CT guided kidney bx, NM Bone scan, MRI L spine, Oncology and Vascular consults  Code Status: Full code  Time spent on Advanced care Plannin minutes  Advanced Care Planning Documents: Completed advanced directives on chart, wife is the POA.     Debi Elliott MD  2021 6:03 PM

## 2021-09-12 NOTE — PROGRESS NOTES
ONCOLOGY HEMATOLOGY CARE  PROGRESS NOTE    SUBJECTIVE:       Still having pain in his right calf. PHYSICAL EXAM:       /62   Pulse 68   Temp 98 °F (36.7 °C) (Oral)   Resp 16   Ht 6' 2\" (1.88 m)   Wt 168 lb 3.4 oz (76.3 kg)   SpO2 94%   BMI 21.60 kg/m²     General appearance: Alert and cooperative. Appears stated age. Head: Normocephalic, without obvious abnormality, atraumatic  EENT:  No icterus. No mucositis. Abdomen: Palpable mass in left midabdomen  Extremities: without cyanosis, clubbing, edema  Neuro:  Alert and oriented. Speech is normal.  Gait is normal.  Cranial nerves are intact. Our conversation was appropriate.       MEDS:     Current Facility-Administered Medications   Medication Dose Route Frequency Provider Last Rate Last Admin    HYDROmorphone HCl PF (DILAUDID) injection 1 mg  1 mg IntraVENous Q4H PRN Osorio Mathur MD   1 mg at 09/12/21 0811    heparin (porcine) injection 4,000 Units  4,000 Units IntraVENous PRN Clarissa Muse MD   4,000 Units at 09/12/21 0805    heparin (porcine) injection 2,000 Units  2,000 Units IntraVENous PRN Clarissa Muse MD        heparin 25,000 units in dextrose 5% 250 mL (premix) infusion  5-30 Units/kg/hr IntraVENous Continuous Clarissa Muse MD 15.3 mL/hr at 09/12/21 0806 20 Units/kg/hr at 09/12/21 0806    sodium chloride flush 0.9 % injection 5-40 mL  5-40 mL IntraVENous PRN Tiffanie Alvarado MD   10 mL at 09/11/21 1254    HYDROcodone-acetaminophen (NORCO) 5-325 MG per tablet 1 tablet  1 tablet Oral Q6H PRN Osorio Mathur MD   1 tablet at 09/11/21 1005    isosorbide mononitrate (IMDUR) extended release tablet 30 mg  30 mg Oral Daily Osorio Mathur MD   30 mg at 09/12/21 0811    meloxicam (MOBIC) tablet 15 mg  15 mg Oral Daily Osorio Mathur MD   15 mg at 09/12/21 0811    sodium chloride flush 0.9 % injection 5-40 mL  5-40 mL IntraVENous 2 times per day Everlyn Stan, MD   10 mL at 09/11/21 0751    sodium chloride flush 0.9 % injection 5-40 mL  5-40 mL IntraVENous PRN Osorio Mathur MD        0.9 % sodium chloride infusion  25 mL IntraVENous PRN Osorio Mathur MD        ondansetron (ZOFRAN-ODT) disintegrating tablet 4 mg  4 mg Oral Q8H PRN Osorio Mathur MD        Or    ondansetron (ZOFRAN) injection 4 mg  4 mg IntraVENous Q6H PRN Osorio Mathur MD        polyethylene glycol (GLYCOLAX) packet 17 g  17 g Oral Daily PRN Osorio Mathur MD   17 g at 09/11/21 2333    acetaminophen (TYLENOL) tablet 650 mg  650 mg Oral Q6H PRN Osorio Mathur MD        Or    acetaminophen (TYLENOL) suppository 650 mg  650 mg Rectal Q6H PRN Osorio Mathur MD        atorvastatin (LIPITOR) tablet 40 mg  40 mg Oral Nightly Osorio Mathur MD   40 mg at 09/11/21 2332       LABS:     CBC:   Lab Results   Component Value Date    WBC 5.1 09/12/2021    HGB 7.7 (L) 09/12/2021    HCT 23.7 (L) 09/12/2021    MCV 69.7 (L) 09/12/2021     09/12/2021    LYMPHOPCT 14.2 09/10/2021    RBC 3.40 (L) 09/12/2021    MCH 22.7 (L) 09/12/2021    MCHC 32.5 09/12/2021    RDW 14.8 09/12/2021    NEUTOPHILPCT 74.1 09/10/2021    MONOPCT 9.8 09/10/2021    EOSPCT 0.7 03/24/2011    BASOPCT 0.7 09/10/2021    NEUTROABS 4.9 09/10/2021    LYMPHSABS 0.9 (L) 09/10/2021    MONOSABS 0.6 09/10/2021    EOSABS 0.1 09/10/2021    BASOSABS 0.0 09/10/2021       CMP:   Lab Results   Component Value Date     09/12/2021    K 4.8 09/12/2021    CL 97 09/12/2021    CO2 27 09/12/2021    BUN 23 09/12/2021    CREATININE 1.3 09/12/2021    GLUCOSE 103 09/12/2021    CALCIUM 10.3 09/12/2021    PROT 8.0 09/10/2021    PROT 5.8 03/24/2011    LABALBU 3.6 09/10/2021    BILITOT 0.3 09/10/2021    ALKPHOS 85 09/10/2021    AST 7 09/10/2021    ALT <5 09/10/2021      Results for [facility] (MRN [id number]) as of 9/12/2021 09:07   Ref.  Range 9/10/2021 18:21   Ferritin Latest Ref Range: 30.0 - 400.0 ng/mL 1,436.0 (H)   Iron Latest Ref Range: 59 - 158 ug/dL 14 (L) Iron Saturation Latest Ref Range: 20 - 50 % 8 (L)   TIBC Latest Ref Range: 260 - 445 ug/dL 179 (L)   Folate Latest Ref Range: 4.78 - 24.20 ng/mL 3.53 (L)   Vitamin B-12 Latest Ref Range: 211 - 911 pg/mL 698   Results for Jakob Loza (MRN [id number]) as of 9/12/2021 09:07   Ref. Range 9/12/2021 07:02   Retic Ct Abs Latest Units: M/uL 0.060               IMAGING:     MRI BRAIN W WO CONTRAST [[id number]] Collected: 09/11/21 1418      Order Status: Completed Updated: 09/11/21 1430     Narrative:       EXAMINATION:   MRI OF THE BRAIN WITHOUT AND WITH CONTRAST  9/11/2021 12:12 pm     TECHNIQUE:   Multiplanar multisequence MRI of the head/brain was performed without and   with the administration of intravenous contrast.     COMPARISON:   None. HISTORY:   ORDERING SYSTEM PROVIDED HISTORY: cancer staging   TECHNOLOGIST PROVIDED HISTORY:   Reason for exam:->cancer staging   Reason for Exam: Left renal mass; R/O mets   Acuity: Unknown   Type of Exam: Initial     FINDINGS:   INTRACRANIAL STRUCTURES/VENTRICLES: Linda Michelle is minimal parenchymal volume   loss.  There is mild T2/FLAIR hyperintensity in the periventricular and   subcortical white matter, likely related to minimal chronic microvascular   disease.  There is no acute infarct. No mass effect or midline shift. No   acute intracranial hemorrhage. There is no hydrocephalus. The   sellar/suprasellar regions appear unremarkable.  The normal signal voids   within the major intracranial vessels appear maintained.  No abnormal focus   of enhancement is seen within the brain. ORBITS: The visualized portion of the orbits demonstrate no acute abnormality. SINUSES: There is scattered minimal mucosal thickening in the paranasal   sinuses.  There are moderate bilateral mastoid effusions. BONES/SOFT TISSUES: The bone marrow signal intensity appears normal. The soft   tissues demonstrate no acute abnormality.      Impression:       No acute intracranial abnormality.  No mass effect or abnormal intracranial   enhancement to suggest intracranial metastasis. Minimal chronic microvascular disease. Minimal parenchymal volume loss. Moderate bilateral mastoid effusions.      VL DUP LOWER EXTREMITY ARTERIES BILATERAL [[id number]]      Order Status: Sent      CT CHEST W CONTRAST [[id number]] Collected: 09/11/21 1055     Order Status: Completed Updated: 09/11/21 1107     Narrative:       EXAMINATION:   CT OF THE CHEST WITH CONTRAST 9/11/2021 10:41 am     TECHNIQUE:   CT of the chest was performed with the administration of intravenous   contrast. Multiplanar reformatted images are provided for review. Dose   modulation, iterative reconstruction, and/or weight based adjustment of the   mA/kV was utilized to reduce the radiation dose to as low as reasonably   achievable. COMPARISON:   05/04/2007     HISTORY:   ORDERING SYSTEM PROVIDED HISTORY: kidney cancer   TECHNOLOGIST PROVIDED HISTORY:   Reason for exam:->kidney cancer   Reason for Exam: kidney cancer   Acuity: Acute   Type of Exam: Initial     FINDINGS:   Mediastinum: Coronary artery calcifications are a marker of atherosclerosis. However, there are multiple enlarged mediastinal and bilateral hilar lymph   nodes concerning for prem metastasis.  An index left infrahilar lymph node   measures 2.7 x 3.3 cm. Lungs/pleura: Mucous plugging is present within the subsegmental left lower   lobe bronchus.  There is no pneumothorax.  A trace left pleural effusion with   bibasilar atelectasis is noted. However, there are multiple solid bilateral pulmonary nodules consistent with   metastatic disease.  An index pulmonary nodule is present along the right   minor fissure measuring 2.2 x 2.4 cm.  In addition, there is subtle   ground-glass opacities within the bilateral lower lobe likely due to   pneumonitis.      Upper Abdomen: No change in the 3.7 x 3.7 cm left adrenal mass concerning for   metastatic disease.  The left renal mass is incompletely imaged. Soft Tissues/Bones: Degenerative changes involve the thoracic spine. However, there are multiple bony destructive lesions throughout the axial   skeleton including the bilateral ribs and thoracic spine.  An index lesion   within the right T2 vertebral body extending into the pedicle measures 1.8 x   1.9 cm.      Impression:       1. Pulmonary, osseous, mediastinal and hilar prem metastasis.  If it will   change clinical management, recommend further evaluation with PET-CT.      NM BONE SCAN WHOLE BODY [[id number]]      Order Status: No result          ASSESSMENT:         Patient Active Problem List   Diagnosis Code    Dislocation of hip prosthesis ([facility]) T84.029A, Z96.649    Hip pain M25.559    Hand pain M79.643    Swelling joint, finger, hand [id number]    De Quervain's disease (tenosynovitis) M65.4    Gout M10.9    Left renal mass N28.89    Peripheral arterial disease ([facility]) I73.9    Stenosis of left popliteal artery ([facility]) I70.202    Right popliteal artery occlusion ([facility]) I70.201    Bilateral Popliteal artery aneurysm ([facility]) I72.4    Anemia D64.9    HTN (hypertension) I10     Large palpable left renal mass with bone, lung, adenopathy is highly suspicious for primary metastatic kidney cancer. MRI brain shows no brain metastases. Anemia evaluation showed no iron/B12, folate deficiency. Low retic count is consistent with poor marrow production, likely with either bone marrow involvement or just anemia of chronic disease. PLAN:       Await kidney biopsy, pathology report. Await bone scan. Ordered MRI spine to better define if he has spinal disease that accounts for his constant right calf pain, since this is not completely consistent with ischemic claudication.     Gabriel Flynn MD

## 2021-09-13 NOTE — PROGRESS NOTES
Pt awake in bed at this time. VSS, assessment complete  and pt denies any needs at this time. Call light in reach.

## 2021-09-13 NOTE — PROGRESS NOTES
Oncology Hematology Care   Progress Note      SUBJECTIVE:      Events noted. Pain is okay  No external bleeding. OBJECTIVE    Physical  VITALS:  /69   Pulse 75   Temp 97.9 °F (36.6 °C) (Oral)   Resp 16   Ht 6' 2\" (1.88 m)   Wt 177 lb 7.5 oz (80.5 kg)   SpO2 94%   BMI 22.79 kg/m²   TEMPERATURE:  Current - Temp: 97.9 °F (36.6 °C); Max - Temp  Av °F (36.7 °C)  Min: 97.6 °F (36.4 °C)  Max: 98.6 °F (37 °C)  BLOOD PRESSURE RANGE:  Systolic (05GNC), BIN:398 , Min:98 , WYS:455   ; Diastolic (39TFE), IYR:33, Min:59, Max:69    24HR INTAKE/OUTPUT:    Intake/Output Summary (Last 24 hours) at 2021 0759  Last data filed at 2021 0528  Gross per 24 hour   Intake --   Output 1600 ml   Net -1600 ml       Conscious alert and oriented. Pallor is present. No icterus  Neck is supple. Lungs are clear to auscultation respiratory efforts are normal.  Abdomen is soft bowel sounds are heard. Extremities no edema.     Data  Labs:  General Labs:  CBC with Differential:    Lab Results   Component Value Date    WBC 5.1 2021    RBC 3.38 2021    HGB 7.6 2021    HCT 23.5 2021     2021    MCV 69.5 2021    MCH 22.5 2021    MCHC 32.4 2021    RDW 14.9 2021    SEGSPCT 77.8 2011    LYMPHOPCT 14.2 09/10/2021    MONOPCT 9.8 09/10/2021    EOSPCT 0.7 2011    BASOPCT 0.7 09/10/2021    MONOSABS 0.6 09/10/2021    LYMPHSABS 0.9 09/10/2021    EOSABS 0.1 09/10/2021    BASOSABS 0.0 09/10/2021    DIFFTYPE Auto 2011     BMP:    Lab Results   Component Value Date     2021    K 4.4 2021    CL 95 2021    CO2 26 2021    BUN 25 2021    LABALBU 3.6 09/10/2021    CREATININE 1.2 2021    CALCIUM 9.9 2021    GFRAA >60 2021    GFRAA >60 2011    LABGLOM 60 2021    GLUCOSE 114 2021     Hepatic Function Panel:    Lab Results   Component Value Date    ALKPHOS 85 09/10/2021    ALT <5 09/10/2021    AST 7 09/10/2021    PROT 8.0 09/10/2021    PROT 5.8 03/24/2011    BILITOT 0.3 09/10/2021    LABALBU 3.6 09/10/2021     LDH:    Lab Results   Component Value Date     09/12/2021     PT/INR:    Lab Results   Component Value Date    PROTIME 13.8 09/13/2021    INR 1.21 09/13/2021     PTT:    Lab Results   Component Value Date    APTT 35.7 09/13/2021   [APTT    Current Medications  Current Facility-Administered Medications: LORazepam (ATIVAN) injection 1 mg, 1 mg, IntraVENous, Once  polyethylene glycol (GLYCOLAX) packet 17 g, 17 g, Oral, BID  0.9 % sodium chloride infusion, , IntraVENous, Continuous  HYDROmorphone HCl PF (DILAUDID) injection 1 mg, 1 mg, IntraVENous, Q4H PRN  heparin (porcine) injection 4,000 Units, 4,000 Units, IntraVENous, PRN  heparin (porcine) injection 2,000 Units, 2,000 Units, IntraVENous, PRN  heparin 25,000 units in dextrose 5% 250 mL (premix) infusion, 5-30 Units/kg/hr, IntraVENous, Continuous  sodium chloride flush 0.9 % injection 5-40 mL, 5-40 mL, IntraVENous, PRN  HYDROcodone-acetaminophen (NORCO) 5-325 MG per tablet 1 tablet, 1 tablet, Oral, Q6H PRN  isosorbide mononitrate (IMDUR) extended release tablet 30 mg, 30 mg, Oral, Daily  meloxicam (MOBIC) tablet 15 mg, 15 mg, Oral, Daily  sodium chloride flush 0.9 % injection 5-40 mL, 5-40 mL, IntraVENous, 2 times per day  sodium chloride flush 0.9 % injection 5-40 mL, 5-40 mL, IntraVENous, PRN  0.9 % sodium chloride infusion, 25 mL, IntraVENous, PRN  ondansetron (ZOFRAN-ODT) disintegrating tablet 4 mg, 4 mg, Oral, Q8H PRN **OR** ondansetron (ZOFRAN) injection 4 mg, 4 mg, IntraVENous, Q6H PRN  polyethylene glycol (GLYCOLAX) packet 17 g, 17 g, Oral, Daily PRN  acetaminophen (TYLENOL) tablet 650 mg, 650 mg, Oral, Q6H PRN **OR** acetaminophen (TYLENOL) suppository 650 mg, 650 mg, Rectal, Q6H PRN  atorvastatin (LIPITOR) tablet 40 mg, 40 mg, Oral, Nightly    ASSESSMENT AND PLAN    30-year-old gentleman has    1.   Left kidney mass, bony static lesions and mediastinal lymphadenopathy:    -Clinical picture is suspicious for metastatic renal cell carcinoma.  -Talk to Dr. David Jean would be to do CT-guided biopsy of the lesion on the iliac bone. This will be safer and would have decreased bleeding risk. -LDH is 173    2. Anemia:    -Anemia of chronic disease and iron deficiency.  -Check soluble transferrin receptor  -Hemoglobin is 7.6.  -Transfuse PRBCs if hemoglobin is less than 7    3.   Peripheral arterial disease:    -Vascular is following.  -Has been started on IV heparin-now on hold for biopsy          Wil Lewis MD

## 2021-09-13 NOTE — PRE SEDATION
Sedation Pre-Procedure Note    Patient Name: Ivana Chowdary   YOB: 1950  Room/Bed: [facility]-3319/3319-01  Medical Record Number: [id number]  Date: 9/13/2021   Time: 10:28 AM       Indication:  CT guided left iliac bone biopsy, suspect metastatic renal ca. Consent: I have discussed with the patient and/or the patient representative the indication, alternatives, and the possible risks and/or complications of the planned procedure and the anesthesia methods. The patient and/or patient representative appear to understand and agree to proceed. Vital Signs:   Vitals:    09/13/21 0800   BP: 131/73   Pulse: 76   Resp: 17   Temp: 97.8 °F (36.6 °C)   SpO2: 93%       Past Medical History:   has a past medical history of Arthritis, De Quervain's tenosynovitis, Diverticulitis, Gout, and HTN (hypertension). Past Surgical History:   has a past surgical history that includes hip surgery; Liver surgery; hernia repair; and hip surgery (3/22/11). Medications:   Scheduled Meds:    LORazepam  1 mg IntraVENous Once    polyethylene glycol  17 g Oral BID    isosorbide mononitrate  30 mg Oral Daily    sodium chloride flush  5-40 mL IntraVENous 2 times per day    atorvastatin  40 mg Oral Nightly     Continuous Infusions:    sodium chloride 100 mL/hr at 09/13/21 0855    heparin (PORCINE) Infusion Stopped (09/13/21 0054)    sodium chloride       PRN Meds: sodium chloride flush, HYDROmorphone, heparin (porcine), heparin (porcine), sodium chloride flush, HYDROcodone-acetaminophen, sodium chloride flush, sodium chloride, ondansetron **OR** ondansetron, polyethylene glycol, acetaminophen **OR** acetaminophen  Home Meds:   Prior to Admission medications    Medication Sig Start Date End Date Taking?  Authorizing Provider   nabumetone (RELAFEN) 500 MG tablet Take 500 mg by mouth 2 times daily   Yes Historical Provider, MD   isosorbide mononitrate (IMDUR) 30 MG extended release tablet Take 30 mg by mouth daily   Yes Historical Provider, MD   MULTIPLE VITAMIN PO Take 1 tablet by mouth daily. 3/18/11  Yes Historical Provider, MD     Coumadin Use Last 7 Days:  no  Antiplatelet drug therapy use last 7 days: no  Other anticoagulant use last 7 days: no  Additional Medication Information:  na      Pre-Sedation Documentation and Exam:   I have reviewed the patient's history and review of systems.     Mallampati Airway Assessment:  Mallampati Class II - (soft palate, fauces & uvula are visible)    Prior History of Anesthesia Complications:   none    ASA Classification:  Class 2 - A normal healthy patient with mild systemic disease    Sedation/ Anesthesia Plan:   intravenous sedation    Medications Planned:   midazolam (Versed) intravenously and fentanyl intravenously    Patient is an appropriate candidate for plan of sedation: yes    Electronically signed by Dari Goodson MD on 9/13/2021 at 10:28 AM

## 2021-09-13 NOTE — PROGRESS NOTES
Hospitalist Progress Note      PCP: Kirit Bhatti MD    Date of Admission: 9/10/2021    Chief Complaint: R leg pain    Hospital Course:  26-year-old gentleman with a history of gout, PVD, HTN, tobacco abuse who presented for worsening right lower extremity intermittent claudication symptoms and had CT aortogram with runoff was done and incidentally found to have right renal mass concerning for renal cell cancer with possible local metastatic disease complicated by peripheral arterial disease with claudication and occlusion of the right popliteal artery. Admitted as inpatient for further management. Started on ASA and stain. Oncology and Vascular consulted to evaluate. CT Chest with:    1. Pulmonary, osseous, mediastinal and hilar prem metastasis.  If it will   change clinical management, recommend further evaluation with PET-CT. MRI Brain:    No acute intracranial abnormality.  No mass effect or abnormal intracranial   enhancement to suggest intracranial metastasis.       Minimal chronic microvascular disease.       Minimal parenchymal volume loss.       Moderate bilateral mastoid effusions. For Bone scan and MRI L spine. For IR biopsy on 9/13; ? Bone lesion. Dr Humphries Ohs to discuss with IR. Started on IVF for SYL on 9/12.  ? JOSETTE. Renal consulted for SYL. Vascular has ordered for IRAJ to evaluate PAD. Vascular to decide on anticoagulation with Oncology. Subjective:  Patient slightly anxious about everything happening. Denies pain in R leg this morning. Bula Dayhoff No CP, SOB, HA or fevers.         Medications:  Reviewed    Infusion Medications    sodium chloride 100 mL/hr at 09/12/21 2202    heparin (PORCINE) Infusion Stopped (09/13/21 0054)    sodium chloride       Scheduled Medications    LORazepam  1 mg IntraVENous Once    polyethylene glycol  17 g Oral BID    isosorbide mononitrate  30 mg Oral Daily    meloxicam  15 mg Oral Daily    sodium chloride flush  5-40 mL IntraVENous 2 times per day    atorvastatin  40 mg Oral Nightly     PRN Meds: HYDROmorphone, heparin (porcine), heparin (porcine), sodium chloride flush, HYDROcodone-acetaminophen, sodium chloride flush, sodium chloride, ondansetron **OR** ondansetron, polyethylene glycol, acetaminophen **OR** acetaminophen      Intake/Output Summary (Last 24 hours) at 9/13/2021 0837  Last data filed at 9/13/2021 0528  Gross per 24 hour   Intake --   Output 1400 ml   Net -1400 ml       Physical Exam Performed:    /73   Pulse 76   Temp 97.8 °F (36.6 °C) (Oral)   Resp 17   Ht 6' 2\" (1.88 m)   Wt 177 lb 7.5 oz (80.5 kg)   SpO2 93%   BMI 22.79 kg/m²     General appearance: No apparent distress, appears stated age and cooperative. HEENT: Pupils equal, round, and reactive to light. Conjunctivae/corneas clear. Neck: Supple, with full range of motion. No jugular venous distention. Trachea midline. Respiratory:  Normal respiratory effort. Clear to auscultation, bilaterally without Rales/Wheezes/Rhonchi. Cardiovascular: Regular rate and rhythm with normal S1/S2 without murmurs, rubs or gallops. Abdomen: Soft, non-tender, non-distended with normal bowel sounds. Mass palpable in L abdomen. Musculoskeletal: No clubbing, cyanosis or edema bilaterally. Full range of motion without deformity. Skin: Skin color, texture, turgor normal.  No rashes or lesions. Neurologic:  Neurovascularly intact without any focal sensory/motor deficits.  Cranial nerves: II-XII intact, grossly non-focal.  Psychiatric: Alert and oriented, thought content appropriate, normal insight  Capillary Refill: Brisk,3 seconds, normal   Peripheral Pulses: +2 palpable, equal bilaterally       Labs:   Recent Labs     09/11/21  1301 09/12/21  0702 09/13/21  0314   WBC 6.9 5.1 5.1   HGB 8.3* 7.7* 7.6*   HCT 25.6* 23.7* 23.5*    393 385     Recent Labs     09/11/21  0523 09/12/21  0702 09/13/21  0314    132* 130*   K 4.2 4.8 4.4    97* 95*   CO2 27 27 26 BUN 12 23* 25*   CREATININE 0.7* 1.3 1.2   CALCIUM 10.4 10.3 9.9     Recent Labs     09/10/21  1821   AST 7*   ALT <5*   BILITOT 0.3   ALKPHOS 85     Recent Labs     09/10/21  1821 09/13/21  0314   INR 1.43* 1.21*     No results for input(s): Sera [place] in the last 72 hours. Urinalysis:      Lab Results   Component Value Date    NITRU NEGATIVE 03/18/2011    WBCUA 0-3 03/18/2011    RBCUA 0-2 03/18/2011    BLOODU NEGATIVE 03/18/2011    SPECGRAV 1.010 03/18/2011    GLUCOSEU NEGATIVE 03/18/2011       Radiology:  MRI BRAIN W WO CONTRAST   Final Result   No acute intracranial abnormality. No mass effect or abnormal intracranial   enhancement to suggest intracranial metastasis. Minimal chronic microvascular disease. Minimal parenchymal volume loss. Moderate bilateral mastoid effusions. CT CHEST W CONTRAST   Final Result   1. Pulmonary, osseous, mediastinal and hilar prem metastasis. If it will   change clinical management, recommend further evaluation with PET-CT. CTA ABDOMINAL AORTA W BILAT RUNOFF W CONTRAST   Final Result   Nonvascular-      Large, 15.2 cm left renal mass, typical of renal cell carcinoma. There is local metastasis with an adrenal nodule and perinephric nodes. Several left basilar pulmonary nodules measuring up to 9 mm, suspicious for   metastasis. Suspected osteolytic metastases, involving the left inferior pubic ramus,   left ilium and left femoral diaphysis. Vascular-      Aortoiliac inflow is preserved. There is no abdominal aortic aneurysm. Bilateral superficial femoral arteries are widely patent and ectatic. Bilateral fusiform aneurysms of the popliteal arteries. On the right, no   flow is noted in the lower popliteal or trifurcation vessels. On the left   there is a moderate stenosis of the inferior popliteal, although there is   distal runoff via a diseased peroneal vessel.          NM BONE SCAN WHOLE BODY    (Results Pending)   CT BIOPSY RENAL    (Results Pending)   VL DUP LOWER EXTREMITY ARTERIES BILATERAL    (Results Pending)   MRI LUMBAR SPINE W CONTRAST    (Results Pending)           Assessment/Plan:    Active Hospital Problems    Diagnosis     Anemia [D64.9]     HTN (hypertension) [I10]     Left renal mass [N28.89]     Peripheral arterial disease (HCC) [I73.9]     Stenosis of left popliteal artery (HCC) [I70.202]     Right popliteal artery occlusion ([address] [I70.201]     Bilateral Popliteal artery aneurysm (HCC) [I72.4]      1. Hold Hep gtt at [place] for IR bx today  2. On ASA and Lipitor for PAD  3. Oncology consult for renal mass appreciated  4. Vascular consult for PAD appreciated  5. Continue Imdur  6. Await MRI L spine  7. IR bx today per Onc  8. Await NM Bone scan  9. Continue IVF for SYL, ? JOSETTE  10. Renal consult for SYL  11. Stop Mobic    DVT Prophylaxis: SCD  Diet: Diet NPO  Code Status: Full Code    PT/OT Eval Status: Not needed    Dispo - Home    Discussed with patient, Dr Swati Quesada (Onc), Dr Lars Oakley (Renal), Nicolas Peña (Vasc NP) and nursing. Needs IR bx, NM Bone scan, MRI L spine and IRAJ. ? Port while here, defer to Onc and Vascular. Anticoagulation to be decided by Vascular and Oncology. Hopefully home by Wednesday.     Jonathan Mas MD

## 2021-09-13 NOTE — CONSULTS
Nephrology Consult Note  [phone]  [phone]   [email or web address]        Reason for Consult:  SYL     HISTORY OF PRESENT ILLNESS:      The patient is a 73 y.o. male with significant past medical history of hypertension, gout, diverticulitis, who presented to the ER on 9/10/2021 with complaints of right leg pain. Apparently has had the right leg pain for about a week prior to the ER visit and had been seen by his PCP who ordered an ultrasound. The ultrasound revealed bilateral popliteal artery aneurysms. On 910 he underwent a CTA abdominal aortogram with runoff which noticed a large left renal mass with meta stasis to the adrenal gland lymph nodes. He again underwent a CT chest with contrast on 9/11. His serum creatinine on 910 was 0.7 mg/dL yesterday it was 1.3 and today is 1.2. He has no prior history of kidney disease kidney stone disease. There is no history of diabetes. . He has never had proteinuria or gross hematuria. He does have history of hypertension. Has been taking NSAID - naproxen       Past Medical History:        Diagnosis Date    Arthritis     De Quervain's tenosynovitis     Diverticulitis     Gout     HTN (hypertension) 9/11/2021       Past Surgical History:        Procedure Laterality Date    HERNIA REPAIR      bilateral inguinal    HIP SURGERY      right hip replacement    HIP SURGERY  3/22/11    REMOVAL OF HARDWARE, RIGHT HIP REVISION, CEMENTED    LIVER SURGERY         Current Medications:    No current facility-administered medications on file prior to encounter. Current Outpatient Medications on File Prior to Encounter   Medication Sig Dispense Refill    nabumetone (RELAFEN) 500 MG tablet Take 500 mg by mouth 2 times daily      isosorbide mononitrate (IMDUR) 30 MG extended release tablet Take 30 mg by mouth daily      MULTIPLE VITAMIN PO Take 1 tablet by mouth daily. Allergies:  Patient has no known allergies.     Social History:    Social History Socioeconomic History    Marital status:      Spouse name: Not on file    Number of children: Not on file    Years of education: Not on file    Highest education level: Not on file   Occupational History    Occupation:    Tobacco Use    Smoking status: Current Every Day Smoker     Years: 20.00     Types: Cigars    Smokeless tobacco: Never Used    Tobacco comment: smokes 5 cigars/day   Substance and Sexual Activity    Alcohol use: No     Comment: former    Drug use: No    Sexual activity: Not on file   Other Topics Concern    Not on file   Social History Narrative    Not on file     Social Determinants of Health     Financial Resource Strain:     Difficulty of Paying Living Expenses:    Food Insecurity:     Worried About [address] [employer] in the Last Year:     [employer] in the Last Year:    Transportation Needs:     Lack of Transportation (Medical):      Lack of Transportation (Non-Medical):    Physical Activity:     Days of Exercise per Week:     Minutes of Exercise per Session:    Stress:     Feeling of Stress :    Social Connections:     Frequency of Communication with Friends and Family:     Frequency of Social Gatherings with Friends and Family:     Attends Muslim Services:     Active Member of Clubs or Organizations:     Attends Club or Organization Meetings:     Marital Status:    Intimate Partner Violence:     Fear of Current or Ex-Partner:     Emotionally Abused:     Physically Abused:     Sexually Abused:        Family History:       Problem Relation Age of Onset    Cancer Mother         lung    Heart Disease Maternal Grandmother          REVIEW OF SYSTEMS:      10 pt ROS done, relevant features as in [place], rest negative       PHYSICAL EXAM:    Vitals:    /73   Pulse 76   Temp 97.8 °F (36.6 °C) (Oral)   Resp 17   Ht 6' 2\" (1.88 m)   Wt 177 lb 7.5 oz (80.5 kg)   SpO2 93%   BMI 22.79 kg/m²   I/O last 3 completed shifts:  In: - Out: 1600 [Urine:1600]  No intake/output data recorded. Physical Exam:  Gen:  alert, oriented x 3( in pain )   PERRL , EOM +  Pallor +, No icterus  JVP not raised   CV: RRR no murmur or rub . Lungs:B/ L air entry, Normal breath sounds   Abd: soft, bowel sounds + , No organomegaly , Rt subcostal scar ( liver abscess) , Inguinal area scar   Ext: No edema, no cyanosis,  Skin: Warm.   No rash  Neuro: no focal deficit       DATA:    CBC with Differential:    Lab Results   Component Value Date    WBC 5.1 09/13/2021    RBC 3.38 09/13/2021    HGB 7.6 09/13/2021    HCT 23.5 09/13/2021     09/13/2021    MCV 69.5 09/13/2021    MCH 22.5 09/13/2021    MCHC 32.4 09/13/2021    RDW 14.9 09/13/2021    SEGSPCT 77.8 03/24/2011    LYMPHOPCT 14.2 09/10/2021    MONOPCT 9.8 09/10/2021    EOSPCT 0.7 03/24/2011    BASOPCT 0.7 09/10/2021    MONOSABS 0.6 09/10/2021    LYMPHSABS 0.9 09/10/2021    EOSABS 0.1 09/10/2021    BASOSABS 0.0 09/10/2021    DIFFTYPE Auto 03/24/2011     CMP:    Lab Results   Component Value Date     09/13/2021    K 4.4 09/13/2021    CL 95 09/13/2021    CO2 26 09/13/2021    BUN 25 09/13/2021    CREATININE 1.2 09/13/2021    GFRAA >60 09/13/2021    GFRAA >60 03/24/2011    AGRATIO 0.8 09/10/2021    LABGLOM 60 09/13/2021    GLUCOSE 114 09/13/2021    PROT 8.0 09/10/2021    PROT 5.8 03/24/2011    LABALBU 3.6 09/10/2021    CALCIUM 9.9 09/13/2021    BILITOT 0.3 09/10/2021    ALKPHOS 85 09/10/2021    AST 7 09/10/2021    ALT <5 09/10/2021     Magnesium:  No results found for: MG  Phosphorus:  No results found for: PHOS  Uric Acid:    Lab Results   Component Value Date    LABURIC 5.8 07/05/2014     U/A:    Lab Results   Component Value Date    COLORU YELLOW 03/18/2011    PROTEINU NEGATIVE 03/18/2011    PHUR 7.0 03/18/2011    WBCUA 0-3 03/18/2011    RBCUA 0-2 03/18/2011    CLARITYU CLEAR 03/18/2011    SPECGRAV 1.010 03/18/2011    LEUKOCYTESUR TRACE 03/18/2011    UROBILINOGEN 0.2 03/18/2011    BILIRUBINUR NEGATIVE

## 2021-09-13 NOTE — ACP (ADVANCE CARE PLANNING)
Advanced Care Planning Note. Purpose of Encounter: Advanced care planning in light of L renal mass  Parties In Attendance: Patient  Decisional Capacity: Yes  Subjective: Patient is anxious  Objective: Cr 1.2  Goals of Care Determination: Patient wants full support (CPR, vent, surgery, HD, trach, PEG)  Plan:  CT guided kidney bx, NM Bone scan, MRI L spine, Renal, Oncology and Vascular consults, IRAJ  Code Status: Full code  Time spent on Advanced care Plannin minutes  Advanced Care Planning Documents: Completed advanced directives on chart, wife is the POA.     Jannie Sifuentes MD  2021 9:24 AM

## 2021-09-13 NOTE — PROGRESS NOTES
Vascular Progress Note    9/13/2021 9:04 AM    Chief complaint / Reason for visit : right leg claudication, popliteal artery aneurysm     Subjective:  Patient resting in bed. He is complaining of right calf and leg pain at rest.  Appears hemodynamically stable, afebrile. Heparin dripped off for biopsy today.       Vital Signs: /73   Pulse 76   Temp 97.8 °F (36.6 °C) (Oral)   Resp 17   Ht 6' 2\" (1.88 m)   Wt 177 lb 7.5 oz (80.5 kg)   SpO2 93%   BMI 22.79 kg/m²      I/O:      Intake/Output Summary (Last 24 hours) at 9/13/2021 0904  Last data filed at 9/13/2021 0528  Gross per 24 hour   Intake --   Output 1400 ml   Net -1400 ml       Physical Exam:   General: no apparent distress, appears stated age  Chest/Lungs: clear to auscultation bilaterally, no accessory muscle use  Cardiac:  regular rate and rhythm, S1, S2 normal, no murmur, click, rub or gallop  Abdomen:  abdomen is soft without significant tenderness, masses, organomegaly or guarding  Vascular: palpable DP/PT pulses bilaterally with strong doppler signal, prominent palpable popliteal pulses bilaterally   Extremities: warm and well perfused, no signs of cyanosis or ischemia, no significant edema, bilateral upper and lower extremity motorsensory intact    Labs:   Lab Results   Component Value Date     09/13/2021    K 4.4 09/13/2021    CL 95 09/13/2021    CO2 26 09/13/2021    BUN 25 09/13/2021    CREATININE 1.2 09/13/2021    GFRAA >60 09/13/2021    GFRAA >60 03/24/2011    LABGLOM 60 09/13/2021    GLUCOSE 114 09/13/2021    CALCIUM 9.9 09/13/2021     Lab Results   Component Value Date    WBC 5.1 09/13/2021    RBC 3.38 09/13/2021    HGB 7.6 09/13/2021    HCT 23.5 09/13/2021    MCV 69.5 09/13/2021    RDW 14.9 09/13/2021     09/13/2021     Lab Results   Component Value Date    INR 1.21 (H) 09/13/2021    PROTIME 13.8 (H) 09/13/2021        Imaging:    CTA abd/aorta w/ runoff 9/10/21:  Impression   Nonvascular-       Large, 15.2 cm left renal mass, typical of renal cell carcinoma.       There is local metastasis with an adrenal nodule and perinephric nodes.       Several left basilar pulmonary nodules measuring up to 9 mm, suspicious for   metastasis.       Suspected osteolytic metastases, involving the left inferior pubic ramus,   left ilium and left femoral diaphysis.       Vascular-       Aortoiliac inflow is preserved.  There is no abdominal aortic aneurysm.       Bilateral superficial femoral arteries are widely patent and ectatic.       Bilateral fusiform aneurysms of the popliteal arteries.  On the right, no   flow is noted in the lower popliteal or trifurcation vessels.  On the left   there is a moderate stenosis of the inferior popliteal, although there is   distal runoff via a diseased peroneal vessel. CT chest 9/11/21:     Impression   1. Pulmonary, osseous, mediastinal and hilar prem metastasis.  If it will   change clinical management, recommend further evaluation with PET-CT. MRI brain 9/11/21:     Impression   No acute intracranial abnormality.  No mass effect or abnormal intracranial   enhancement to suggest intracranial metastasis.       Minimal chronic microvascular disease.       Minimal parenchymal volume loss.       Moderate bilateral mastoid effusions.             Scheduled Meds:    LORazepam  1 mg IntraVENous Once    polyethylene glycol  17 g Oral BID    isosorbide mononitrate  30 mg Oral Daily    meloxicam  15 mg Oral Daily    sodium chloride flush  5-40 mL IntraVENous 2 times per day    atorvastatin  40 mg Oral Nightly     Continuous Infusions:    sodium chloride 100 mL/hr at 09/13/21 0855    heparin (PORCINE) Infusion Stopped (09/13/21 0054)    sodium chloride           Assessment:   Bilateral popliteal artery aneurysm with right leg claudication - feet warm with palpable pedal pulses  Left renal mass - suspect renal cell carcinoma with metastasis - biopsy planned for today  Anemia  Tobacco use    Plan:   Will get arterial duplex of right leg to further evaluate. Patient likely will need right leg angiogram in the future. Continue Heparin drip for now. Renal biopsy planned for today. Pending results of biopsy and patient's prognosis will help guide further vascular surgery plans. Smoking cessation. Patient educated on plan of care and disease process. All questions answered. Electronically signed by GELY De Los Santos CNP on 9/13/2021 at 9:04 AM     Vascular Staff    [facility] I have reviewed the above documentation completed by Justyn Moe CNP. Please see my additional contributions to the HPI, physical exam, assessment, and medical decision making. No acute changes. Patient resting following percutaneous biopsy for metastatic work-up. Has a 1+ palpable right DP and strong biphasic right PT. Agree with Dr. Antwan Madrid assessment. No significant ischemia of the right lower extremity given physical exam findings. Will obtain right lower extremity duplex for further evaluation and consideration for peripheral angiography in light of metastatic work-up. We will continue to follow  Please contact me with any questions      Fortino Walker M.D., FACS.   9/13/2021  11:58 AM

## 2021-09-14 NOTE — CARE COORDINATION
Discharge Planning Note;  Chart reviewed for d/c planning needs. Patient from home , A&O , independent and it appears that patient has minimal needs for discharge at this time with readmission score of 10% . Discussed with patients nurse and requested that case management be notified if discharge needs are identified. Patient has active insurance with [facility]    Dr Madyson Grider is listed as the PCP    Case management will continue to follow progress and update discharge plan as needed.

## 2021-09-14 NOTE — PLAN OF CARE
Problem: Falls - Risk of:  Goal: Will remain free from falls  Description: Will remain free from falls  Outcome: Met This Shift  Note: Pt is wearing the fall bracelet and yellow nonskid socks. Bed is in lowest position, locked, side rails up 2/4, and call light is within reach. Pt informed of fall risks, verbalizes understanding. Will monitor. Goal: Absence of physical injury  Description: Absence of physical injury  Outcome: Met This Shift     Problem: Tissue Perfusion - Renal, Altered:  Goal: Serum creatinine will be within specified parameters  Description: Serum creatinine will be within specified parameters  Outcome: Met This Shift     Problem: Pain:  Goal: Pain level will decrease  Description: Pain level will decrease  Outcome: Ongoing  Note:    Pt c/o pain. Pt assessed for breathing and BP. Pt educated on pain scale. Medication administered, see MAR. Pt repositioned. Stimuli reduced. Rest promoted. Pain reassessed. Will continue to monitor.        Problem: Tissue Perfusion - Renal, Altered:  Goal: Electrolytes within specified parameters  Description: Electrolytes within specified parameters  Outcome: Ongoing

## 2021-09-14 NOTE — PROGRESS NOTES
Shift assessment completed. Pt is a/o X 4. VSS. Medications administered, see MAR. POC discussed and all questions answered. Pt provided with fresh ice water. Pt has belongings and call light in reach. Bed is locked and in lowest position, bed alarm is refused by pt and pt is a medium fall risk and has a steady gait. Pt denies further needs, will continue to monitor.

## 2021-09-14 NOTE — PROGRESS NOTES
Vascular Progress Note    9/14/2021 9:19 AM    Chief complaint / Reason for visit : right leg claudication, popliteal artery aneurysm     Subjective:  Patient resting in bed. He reports his right foot and leg pain is stable, worse with standing and walking. He had iliac bone biopsy yesterday. Appears hemodynamically stable, afebrile. Remains on heparin drip. Vital Signs: /69   Pulse 74   Temp 97.6 °F (36.4 °C) (Oral)   Resp 16   Ht 6' 2\" (1.88 m)   Wt 177 lb 7.5 oz (80.5 kg)   SpO2 95%   BMI 22.79 kg/m²  O2 Flow Rate (L/min): 2 L/min   I/O:      Intake/Output Summary (Last 24 hours) at 9/14/2021 0919  Last data filed at 9/14/2021 0811  Gross per 24 hour   Intake 4208.33 ml   Output 1750 ml   Net 2458. 33 ml       Physical Exam:   General: no apparent distress, appears stated age  Chest/Lungs: no accessory muscle use  Cardiac:  regular rate and rhythm  Vascular: strong right DP/PT biphasic doppler signal   Extremities: feet warm, no signs of cyanosis or ischemia, no significant edema, bilateral upper and lower extremity motorsensory intact    Labs:   Lab Results   Component Value Date     09/14/2021    K 4.4 09/14/2021     09/14/2021    CO2 25 09/14/2021    BUN 17 09/14/2021    CREATININE 0.9 09/14/2021    GFRAA >60 09/14/2021    GFRAA >60 03/24/2011    LABGLOM >60 09/14/2021    GLUCOSE 120 09/14/2021    CALCIUM 9.9 09/14/2021     Lab Results   Component Value Date    WBC 5.1 09/14/2021    RBC 3.22 09/14/2021    HGB 7.2 09/14/2021    HCT 22.3 09/14/2021    MCV 69.2 09/14/2021    RDW 14.7 09/14/2021     09/14/2021     Lab Results   Component Value Date    INR 1.21 (H) 09/13/2021    PROTIME 13.8 (H) 09/13/2021        Imaging:    Arterial duplex right leg 9/13/21:  Tech Comments   Right   Right IRAJ: 1.39. This is consistent with non-compressible vessels due to   arterial calcification.    Ultrasound images of the right lower extremity reveal mild to moderate   calcific plaque formation, calcified vessel walls and shadowing throughout. A dilatation is noted from the mid-distal to distal femoral artery measuring   1.26 cm pre dilatation to 2.07 cm at the largest diameter. A dilatation is noted in the proximal to mid popliteal artery measuring 0.9 cm   pre dilatation to 1.32 cm at the largest diameter. The mid PTA appears to be occluded with reconstitution of multiphasic flow via   collateralization at the distal segment. The mid JAMIL appears to be occluded with reconstitution of multiphasic flow via   collateralization at the ankle. The proximal to mid JAMIL was not visualized. Multiphasic flow is noted in the peroneal at the ankle. Left   Left IRAJ: 1.39. This is consistent with non-compressible vessels due to   arterial calcification. CTA abd/aorta w/ runoff 9/10/21:  Impression   Nonvascular-       Large, 15.2 cm left renal mass, typical of renal cell carcinoma.       There is local metastasis with an adrenal nodule and perinephric nodes.       Several left basilar pulmonary nodules measuring up to 9 mm, suspicious for   metastasis.       Suspected osteolytic metastases, involving the left inferior pubic ramus,   left ilium and left femoral diaphysis.       Vascular-       Aortoiliac inflow is preserved.  There is no abdominal aortic aneurysm.       Bilateral superficial femoral arteries are widely patent and ectatic.       Bilateral fusiform aneurysms of the popliteal arteries.  On the right, no   flow is noted in the lower popliteal or trifurcation vessels.  On the left   there is a moderate stenosis of the inferior popliteal, although there is   distal runoff via a diseased peroneal vessel. CT chest 9/11/21:     Impression   1. Pulmonary, osseous, mediastinal and hilar prem metastasis.  If it will   change clinical management, recommend further evaluation with PET-CT.      MRI brain 9/11/21:     Impression   No acute intracranial abnormality.  No mass effect or abnormal intracranial   enhancement to suggest intracranial metastasis.       Minimal chronic microvascular disease.       Minimal parenchymal volume loss.       Moderate bilateral mastoid effusions.             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 Could tentatively plan for peripheral angiogram tomorrow. NPO after midnight. Continue IVFs, creatinine 0.9, nephrology following. Continue Heparin drip for now. Smoking cessation. Patient educated on plan of care and disease process. All questions answered.         Electronically signed by GELY Ware CNP on 9/14/2021 at 9:19 AM

## 2021-09-14 NOTE — PROGRESS NOTES
Nephrology Progress  Note  [phone]  [phone]   [email or web address]        Reason for Consult:  SYL   The patient is a 73 y.o. male with significant past medical history of hypertension, gout, diverticulitis, who presented to the ER on 9/10/2021 with complaints of right leg pain. Apparently has had the right leg pain for about a week prior to the ER visit and had been seen by his PCP who ordered an ultrasound. The ultrasound revealed bilateral popliteal artery aneurysms. On 910 he underwent a CTA abdominal aortogram with runoff which noticed a large left renal mass with meta stasis to the adrenal gland lymph nodes. He again underwent a CT chest with contrast on 9/11. His serum creatinine on 910 was 0.7 mg/dL yesterday it was 1.3 and today is 1.2. He has no prior history of kidney disease kidney stone disease. There is no history of diabetes. . He has never had proteinuria or gross hematuria. He does have history of hypertension. Has been taking NSAID - naproxen     Interval History:    Pain under control   No dysuria/gross hematuria     PHYSICAL EXAM:    Vitals:    /69   Pulse 74   Temp 97.6 °F (36.4 °C) (Oral)   Resp 16   Ht 6' 2\" (1.88 m)   Wt 177 lb 7.5 oz (80.5 kg)   SpO2 95%   BMI 22.79 kg/m²   I/O last 3 completed shifts: In: 4208.3 [P.O.:600; I.V.:3608.3]  Out: 1350 [Urine:1350]  I/O this shift:  In: -   Out: 400 [Urine:400]    Physical Exam:  Gen:  alert, oriented x 3  PERRL , EOM +  Pallor +, No icterus  JVP not raised   CV: RRR no murmur or rub . Lungs:B/ L air entry, Normal breath sounds   Abd: soft, bowel sounds + , No organomegaly , Rt subcostal scar ( liver abscess) , Inguinal area scar , bulge left side( did not palpate on left )  Ext: No edema, no cyanosis,  Skin: Warm.   No rash  Neuro: no focal deficit       DATA:    CBC with Differential:    Lab Results   Component Value Date    WBC 5.1 09/14/2021    RBC 3.22 09/14/2021    HGB 7.2 09/14/2021    HCT 22.3 09/14/2021    PLT MD  9/14/2021  [facility]

## 2021-09-14 NOTE — PROGRESS NOTES
(ZOFRAN-ODT) disintegrating tablet 4 mg, Q8H PRN   Or  ondansetron (ZOFRAN) injection 4 mg, Q6H PRN  polyethylene glycol (GLYCOLAX) packet 17 g, Daily PRN  acetaminophen (TYLENOL) suppository 650 mg, Q6H PRN  atorvastatin (LIPITOR) tablet 40 mg, Nightly        Objective:  BP (!) 106/57   Pulse 84   Temp 98 °F (36.7 °C) (Temporal)   Resp 18   Ht 6' 2\" (1.88 m)   Wt 177 lb 7.5 oz (80.5 kg)   SpO2 91%   BMI 22.79 kg/m²     Intake/Output Summary (Last 24 hours) at 9/14/2021 0068  Last data filed at 9/14/2021 9044  Gross per 24 hour   Intake 4208.33 ml   Output 1350 ml   Net 2858.33 ml      Wt Readings from Last 3 Encounters:   09/13/21 177 lb 7.5 oz (80.5 kg)   09/06/21 174 lb 6.1 oz (79.1 kg)   04/21/17 195 lb (88.5 kg)       General appearance:  elderly gentleman, appears comfortable  Eyes: Sclera clear. Pupils equal.  ENT: Moist oral mucosa. Trachea midline, no adenopathy, neck supple. Cardiovascular: Regular rhythm, normal S1, S2. No murmur. No edema in lower extremities  Respiratory: Not using accessory muscles. Good inspiratory effort. Clear to auscultation bilaterally, no wheeze or crackles. GI: Abdomen soft, no tenderness, not distended, normal bowel sounds  Musculoskeletal: No deformity, ROM WNL. Neurology: CN 2-12 grossly intact. No speech or motor deficits  Psych: Normal affect. Alert and oriented in time, place and person  Skin: Warm, dry, normal turgor    Labs and Tests:  CBC:   Recent Labs     09/12/21  0702 09/13/21 0314 09/14/21 0315   WBC 5.1 5.1 5.1   HGB 7.7* 7.6* 7.2*   HCT 23.7* 23.5* 22.3*   MCV 69.7* 69.5* 69.2*    385 339     BMP:    Recent Labs     09/12/21  0702 09/13/21 0314 09/14/21 0315   * 130* 133*   K 4.8 4.4 4.4   CL 97* 95* 101   CO2 27 26 25   BUN 23* 25* 17   CREATININE 1.3 1.2 0.9   GLUCOSE 103* 114* 120*     Hepatic: No results for input(s): AST, ALT, ALB, BILITOT, ALKPHOS in the last 72 hours.       Problem List  Principal Problem:    Left renal mass  Active Problems:    Peripheral arterial disease (HCC)    Stenosis of left popliteal artery (HCC)    Right popliteal artery occlusion (HCC)    Bilateral Popliteal artery aneurysm (HCC)    Anemia    HTN (hypertension)  Resolved Problems:    * No resolved hospital problems. *       Assessment & Plan:   1. Left renal mass: Probably metastatic renal cell carcinoma, CT-guided iliac bone biopsy done, oncology following, bone scan showed metastasis. 2. Iron deficiency anemia: Hemoglobin 8.0--->7.2, will continue to monitor we'll check iron and TIBC  3. Bilateral popliteal artery aneurysm: Patient complained about right lateral leg pain, continue heparin drip, possible angiogram in the future, vascular surgery following  4. Hyperlipidemia: Continue atorvastatin      Diet: ADULT DIET;  Regular  Code:Full Code  DVT PPX lovenox       Tyesha Ferro MD   9/14/2021 6:27 AM

## 2021-09-14 NOTE — PROGRESS NOTES
APTT 58.0. Half bolus given and increased the infusion by 2 units/kg/hr. Heparin infusing at 33 units/kg/hr. PTT 45-59. 9               Half bolus                                          Increase infusion by 2 units/kg/hr

## 2021-09-15 NOTE — OP NOTE
Brief Postoperative Note    Tennis Mile  YOB: 1950  [id number]    Pre-operative Diagnosis: RLE claudication    Post-operative Diagnosis: Same    Procedure: 1. Ultrasound guided left CFA access  2.   Aortogram with 3rd order RLE angiogram    Anesthesia: Local    Surgeons/Assistants: Lisandro Alonzo    Estimated Blood Loss: less than 50     Complications: None    Specimens: Was Not Obtained    Findings: right popliteal artery aneurysm with distal tibial embolism, single vessel peroneal runoff    Electronically signed by Rona Singh MD on 9/15/2021 at 10:54 AM

## 2021-09-15 NOTE — OP NOTE
[address]                     [address] [facility], [address] [facility]                                OPERATIVE REPORT    PATIENT NAME: Lorena Robles                    :        1950  MED REC NO:   [id number]                          ROOM:       5633  ACCOUNT NO:   [de-identified]                           ADMIT DATE: 09/10/2021  PROVIDER:     Jordyn Berry MD    DATE OF PROCEDURE:  09/15/2021    PREPROCEDURE DIAGNOSIS:  Right lower extremity claudication. POSTPROCEDURE DIAGNOSIS:  Right lower extremity claudication. PROCEDURE:  1. Ultrasound-guided left common femoral artery access. 2.  Abdominal aortogram with third order selective right lower extremity  angiogram.    SURGEON:  Jordyn Berry MD    ANESTHESIA:  Local/IV. ESTIMATED BLOOD LOSS:  Minimal.    COMPLICATIONS:  None. INDICATIONS:  The patient is a 62-year-old male with new onset of right  calf claudication. His workup revealed a right popliteal aneurysm with  occlusion of his tibial vessels and he presents for angiographic  evaluation. All risks, benefits, and alternatives were discussed in  detail. All questions were answered. PROCEDURE:  After witnessed informed consent was obtained, the patient  was brought to the cath lab where under my supervision Versed and  fentanyl were administered intravenously for moderate sedation. Pulse  oximetry, heart rate and blood pressure were monitored by an independent  trained observer that was present. I spent 24 minutes of face to face  sedation time with the patient. His left groin was carefully prepped  and draped. Ultrasound was used to identify the left common femoral  artery which was noted to be patent. An image was saved to the  patient's permanent medical record. Under direct visualization, it was  accessed with a 4-German micropuncture needle. Bentson wire was  introduced and a 5-German sheath was placed.   An Omni Flush catheter was  inserted to the level of the renal arteries and an abdominal aortogram  was performed. Given his recent history of renal insufficiency, Omni  Flush was then used to hook the aortic bifurcation. A stiff Glidewire  was placed up and over the bifurcation into the right SFA. The Omni  Flush was removed and a Graham Linger was placed up and over the bifurcation  into the right SFA and a right lower extremity angiogram was then  performed. The procedure was terminated at this point given the  angiographic findings in favor of open surgical revascularization. Manual pressure was held. He tolerated the procedure well and was  transferred to recovery room in stable condition. FINDINGS:  1. Abdominal aortogram:  Right and left renal patent. Infrarenal  abdominal aorta, bilateral common, external and internal iliac arteries  are widely patent. 2.  Right lower extremity runoff:  Right common femoral and profunda are  patent. SFA is patent. There is a popliteal aneurysm with occlusion of  the anterior tibial and posterior tibial and a dominant peroneal runoff  with good collateralization around the ankle. PLAN:  Discussion will be had with the patient and in conjunction with  his ongoing workup for his renal mass in consideration for a right  above-knee to below-knee popliteal bypass for symptom management. His  renal mass workup and possible treatment will take priority over his  claudication as he does not have limb threatening ischemia.         Jazz Causey MD    D: 09/15/2021 11:01:28       T: 09/15/2021 11:03:44     [email or web address]  Job#: [id number]     Doc#: [id number]    CC:

## 2021-09-15 NOTE — PROGRESS NOTES
[facility] PROGRESS NOTE    9/15/2021 6:14 AM        Name: Caitlyn Thompson . Admitted: 9/10/2021  Primary Care Provider: See Banks MD (Tel: [phone])                        Hospital course:      72-year-old gentleman with a history of gout, PVD, HTN, tobacco abuse who presented for worsening right lower extremity intermittent claudication symptoms and had CT aortogram with runoff was done and incidentally found to have right renal mass concerning for renal cell cancer with possible local metastatic disease complicated by peripheral arterial disease with claudication and occlusion of the right popliteal artery.  Admitted as inpatient for further management.  Started on ASA and stain.  Oncology and Vascular consulted to evaluate. CT-guided iliac biopsy done on 9/13/2021, vascular surgery recommended right leg angiogram.      Subjective: No complaints resting the bed  No acute events overnight. Resting well. Pain control. Diet ok. Labs reviewed  Denies any chest pain sob.      Reviewed interval ancillary notes    Current Medications  aspirin EC tablet 81 mg, Daily  sodium chloride flush 0.9 % injection 5-40 mL, PRN  acetaminophen (TYLENOL) tablet 650 mg, Q4H PRN  LORazepam (ATIVAN) injection 1 mg, Once  polyethylene glycol (GLYCOLAX) packet 17 g, BID  0.9 % sodium chloride infusion, Continuous  HYDROmorphone HCl PF (DILAUDID) injection 1 mg, Q4H PRN  heparin (porcine) injection 4,000 Units, PRN  heparin (porcine) injection 2,000 Units, PRN  sodium chloride flush 0.9 % injection 5-40 mL, PRN  HYDROcodone-acetaminophen (NORCO) 5-325 MG per tablet 1 tablet, Q6H PRN  isosorbide mononitrate (IMDUR) extended release tablet 30 mg, Daily  sodium chloride flush 0.9 % injection 5-40 mL, 2 times per day  sodium chloride flush 0.9 % injection 5-40 mL, PRN  0.9 % sodium chloride infusion, PRN  ondansetron (ZOFRAN-ODT) disintegrating tablet 4 mg, Q8H PRN   Or  ondansetron (ZOFRAN) injection 4 mg, Q6H PRN  polyethylene glycol (GLYCOLAX) packet 17 g, Daily PRN  acetaminophen (TYLENOL) suppository 650 mg, Q6H PRN  atorvastatin (LIPITOR) tablet 40 mg, Nightly        Objective:  /67   Pulse 72   Temp 97.3 °F (36.3 °C) (Temporal)   Resp 16   Ht 6' 2\" (1.88 m)   Wt 177 lb 7.5 oz (80.5 kg)   SpO2 96%   BMI 22.79 kg/m²     Intake/Output Summary (Last 24 hours) at 9/15/2021 9289  Last data filed at 9/15/2021 0420  Gross per 24 hour   Intake 411.84 ml   Output 1300 ml   Net -888.16 ml      Wt Readings from Last 3 Encounters:   09/13/21 177 lb 7.5 oz (80.5 kg)   09/06/21 174 lb 6.1 oz (79.1 kg)   04/21/17 195 lb (88.5 kg)       General appearance:  elderly gentleman, appears comfortable  Eyes: Sclera clear. Pupils equal.  ENT: Moist oral mucosa. Trachea midline, no adenopathy, neck supple. Cardiovascular: Regular rhythm, normal S1, S2. No murmur. No edema in lower extremities  Respiratory: Not using accessory muscles. Good inspiratory effort. Clear to auscultation bilaterally, no wheeze or crackles. GI: Abdomen soft, no tenderness, not distended, normal bowel sounds  Musculoskeletal: No deformity, ROM WNL. Neurology: CN 2-12 grossly intact. No speech or motor deficits  Psych: Normal affect. Alert and oriented in time, place and person  Skin: Warm, dry, normal turgor    Labs and Tests:  CBC:   Recent Labs     09/13/21  0314 09/14/21  0315 09/15/21  0547   WBC 5.1 5.1 5.4   HGB 7.6* 7.2* 7.3*   HCT 23.5* 22.3* 22.5*   MCV 69.5* 69.2* 69.3*    339 328     BMP:    Recent Labs     09/13/21  0314 09/14/21  0315 09/15/21  0547   * 133* 134*   K 4.4 4.4 4.8   CL 95* 101 101   CO2 26 25 26   BUN 25* 17 14   CREATININE 1.2 0.9 1.0   GLUCOSE 114* 120* 112*     Hepatic: No results for input(s): AST, ALT, ALB, BILITOT, ALKPHOS in the last 72 hours.       Problem List  Principal Problem:    Left renal mass  Active Problems:    Peripheral arterial disease (HCC)    Stenosis of left popliteal artery (HCC)    Right popliteal artery occlusion (HCC)    Bilateral Popliteal artery aneurysm (HCC)    Anemia    HTN (hypertension)  Resolved Problems:    * No resolved hospital problems. *       Assessment & Plan:   1. Left renal mass: Probably metastatic renal cell carcinoma, CT-guided iliac bone biopsy done, oncology following, bone scan showed metastasis. 2. Iron deficiency anemia: Hemoglobin 8.0--->7.2---7.3, will continue to monitor we'll check iron and TIBC  3. Bilateral popliteal artery aneurysm: Patient complained about right lateral leg pain, continue heparin drip, possible angiogram in the future, vascular surgery planning right leg angiogram today.   4. Hyperlipidemia: Continue atorvastatin         Diet: Diet NPO Exceptions are: Sips of Water with Meds  Code:Full Code  DVT PPX lovenox       Amada Spence MD   9/15/2021 6:14 AM

## 2021-09-15 NOTE — PROGRESS NOTES
[facility]   Progress Note      SUBJECTIVE:      Events noted. The patient had right lower extremity arterial angiogram done. No headaches or dizziness. No chest pain  No abdominal pain. OBJECTIVE    Physical  VITALS:  /64   Pulse 80   Temp 97.5 °F (36.4 °C) (Oral)   Resp 16   Ht 6' 2\" (1.88 m)   Wt 177 lb 7.5 oz (80.5 kg)   SpO2 94%   BMI 22.79 kg/m²   TEMPERATURE:  Current - Temp: 97.5 °F (36.4 °C); Max - Temp  Av.5 °F (36.4 °C)  Min: 97.3 °F (36.3 °C)  Max: 97.9 °F (36.6 °C)  BLOOD PRESSURE RANGE:  Systolic (05WXX), WTA:401 , Min:104 , ORU:247   ; Diastolic (02UBT), BOQ:21, Min:60, Max:74    24HR INTAKE/OUTPUT:      Intake/Output Summary (Last 24 hours) at 9/15/2021 1919  Last data filed at 9/15/2021 1843  Gross per 24 hour   Intake 250 ml   Output 1400 ml   Net -1150 ml       Conscious alert and oriented. Pallor is present. No icterus  Neck is supple. Lungs are clear to auscultation respiratory efforts are normal.  Abdomen is soft bowel sounds are heard. Extremities no edema.     Data  Labs:  General Labs:  CBC with Differential:    Lab Results   Component Value Date    WBC 5.4 09/15/2021    RBC 3.25 09/15/2021    HGB 7.3 09/15/2021    HCT 22.5 09/15/2021     09/15/2021    MCV 69.3 09/15/2021    MCH 22.4 09/15/2021    MCHC 32.2 09/15/2021    RDW 15.1 09/15/2021    SEGSPCT 77.8 2011    LYMPHOPCT 14.2 09/10/2021    MONOPCT 9.8 09/10/2021    EOSPCT 0.7 2011    BASOPCT 0.7 09/10/2021    MONOSABS 0.6 09/10/2021    LYMPHSABS 0.9 09/10/2021    EOSABS 0.1 09/10/2021    BASOSABS 0.0 09/10/2021    DIFFTYPE Auto 2011     BMP:    Lab Results   Component Value Date     09/15/2021    K 4.8 09/15/2021    K 4.4 2021     09/15/2021    CO2 26 09/15/2021    BUN 14 09/15/2021    LABALBU 2.9 09/15/2021    CREATININE 1.0 09/15/2021    CALCIUM 10.3 09/15/2021    GFRAA >60 09/15/2021    GFRAA >60 2011    LABGLOM >60 09/15/2021    GLUCOSE 112 09/15/2021     Hepatic Function Panel:    Lab Results   Component Value Date    ALKPHOS 85 09/10/2021    ALT <5 09/10/2021    AST 7 09/10/2021    PROT 8.0 09/10/2021    PROT 5.8 03/24/2011    BILITOT 0.3 09/10/2021    LABALBU 2.9 09/15/2021     LDH:    Lab Results   Component Value Date     09/12/2021     PT/INR:    Lab Results   Component Value Date    PROTIME 13.8 09/13/2021    INR 1.21 09/13/2021     PTT:    Lab Results   Component Value Date    APTT 49.2 09/14/2021   [APTT    Current Medications  Current Facility-Administered Medications: sodium chloride flush 0.9 % injection 5-40 mL, 5-40 mL, IntraVENous, 2 times per day  sodium chloride flush 0.9 % injection 5-40 mL, 5-40 mL, IntraVENous, PRN  0.9 % sodium chloride infusion, 25 mL, IntraVENous, PRN  aspirin EC tablet 81 mg, 81 mg, Oral, Daily  sodium chloride flush 0.9 % injection 5-40 mL, 5-40 mL, IntraVENous, PRN  acetaminophen (TYLENOL) tablet 650 mg, 650 mg, Oral, Q4H PRN  LORazepam (ATIVAN) injection 1 mg, 1 mg, IntraVENous, Once  polyethylene glycol (GLYCOLAX) packet 17 g, 17 g, Oral, BID  0.9 % sodium chloride infusion, , IntraVENous, Continuous  HYDROmorphone HCl PF (DILAUDID) injection 1 mg, 1 mg, IntraVENous, Q4H PRN  sodium chloride flush 0.9 % injection 5-40 mL, 5-40 mL, IntraVENous, PRN  HYDROcodone-acetaminophen (NORCO) 5-325 MG per tablet 1 tablet, 1 tablet, Oral, Q6H PRN  isosorbide mononitrate (IMDUR) extended release tablet 30 mg, 30 mg, Oral, Daily  sodium chloride flush 0.9 % injection 5-40 mL, 5-40 mL, IntraVENous, 2 times per day  sodium chloride flush 0.9 % injection 5-40 mL, 5-40 mL, IntraVENous, PRN  0.9 % sodium chloride infusion, 25 mL, IntraVENous, PRN  ondansetron (ZOFRAN-ODT) disintegrating tablet 4 mg, 4 mg, Oral, Q8H PRN **OR** ondansetron (ZOFRAN) injection 4 mg, 4 mg, IntraVENous, Q6H PRN  polyethylene glycol (GLYCOLAX) packet 17 g, 17 g, Oral, Daily PRN  [DISCONTINUED] acetaminophen (TYLENOL) tablet 650 mg, 650 mg, Oral, Q6H PRN **OR** acetaminophen (TYLENOL) suppository 650 mg, 650 mg, Rectal, Q6H PRN  atorvastatin (LIPITOR) tablet 40 mg, 40 mg, Oral, Nightly    ASSESSMENT AND PLAN    82-year-old gentleman has    1. Left kidney mass, bony static lesions and mediastinal lymphadenopathy:    -Clinical picture is suspicious for metastatic renal cell carcinoma. - Biopsy from the right iliac wing showed poorly differentiated carcinoma. Pathology was discussed with the pathologist.  -Overall clinical picture is consistent with metastatic renal cell carcinoma.    -Had lengthy discussion with the patient. Told him about the diagnosis of metastatic renal cell carcinoma  -Treatment would be combination immunotherapy done as outpatient.  -Life expectancy in metastatic renal cell carcinoma now can be measured in 12 to 24 months.  -Will have family meeting tomorrow afternoon after 5 PM if patient is still hospitalized. Otherwise will make arrangements for outpatient follow-up. 2.  Anemia:    -Anemia of chronic disease and iron deficiency.  -Check soluble transferrin receptor  -Hemoglobin is 7.6.  -Transfuse PRBCs if hemoglobin is less than 7    3. Peripheral arterial disease:    -Vascular is following.  - Arteriogram done today showed popliteal aneurysm with occlusion of the popliteal artery with collaterals. Discussed with her Dr. Marylee Din.  - Patient will need popliteal bypass but it is not urgent.   - Will start treatment for metastatic RCC as outpatient and reassessment will be done by Dr. Marylee Din as outpatient in 10 to 16 weeks          Jens Amado MD

## 2021-09-15 NOTE — PROGRESS NOTES
Vascular Progress Note    9/15/2021 8:55 AM    Chief complaint / Reason for visit : right leg claudication, popliteal artery aneurysm     Subjective:  Patient resting in bed. He reports his right foot pain is the same. No new complaints. NPO since midnight. Heparin drip off since 5 am.  Appears hemodynamically stable, afebrile. Vital Signs: /70   Pulse 74   Temp 97.5 °F (36.4 °C) (Oral)   Resp 16   Ht 6' 2\" (1.88 m)   Wt 177 lb 7.5 oz (80.5 kg)   SpO2 93%   BMI 22.79 kg/m²  O2 Flow Rate (L/min): 2 L/min   I/O:      Intake/Output Summary (Last 24 hours) at 9/15/2021 0855  Last data filed at 9/15/2021 0853  Gross per 24 hour   Intake 250 ml   Output 1250 ml   Net -1000 ml       Physical Exam:   ASA 3 - Patient with moderate systemic disease with functional limitations    Mallampati Airway Assessment:  Mallampati Class II - (soft palate, fauces & uvula are visible)    General: no apparent distress, appears stated age  Chest/Lungs: no accessory muscle use  Cardiac:  regular rate and rhythm  Vascular: 1+ right DP pulse  Extremities: feet warm, no signs of cyanosis or ischemia, no significant edema, bilateral upper and lower extremity motorsensory intact    Labs:   Lab Results   Component Value Date     09/15/2021    K 4.8 09/15/2021    K 4.4 09/14/2021     09/15/2021    CO2 26 09/15/2021    BUN 14 09/15/2021    CREATININE 1.0 09/15/2021    GFRAA >60 09/15/2021    GFRAA >60 03/24/2011    LABGLOM >60 09/15/2021    GLUCOSE 112 09/15/2021    PHOS 3.7 09/15/2021    CALCIUM 10.3 09/15/2021     Lab Results   Component Value Date    WBC 5.4 09/15/2021    RBC 3.25 09/15/2021    HGB 7.3 09/15/2021    HCT 22.5 09/15/2021    MCV 69.3 09/15/2021    RDW 15.1 09/15/2021     09/15/2021     Lab Results   Component Value Date    INR 1.21 (H) 09/13/2021    PROTIME 13.8 (H) 09/13/2021        Imaging:    Arterial duplex right leg 9/13/21:  Tech Comments   Right   Right IRAJ: 1.39.  This is consistent with non-compressible vessels due to   arterial calcification. Ultrasound images of the right lower extremity reveal mild to moderate   calcific plaque formation, calcified vessel walls and shadowing throughout. A dilatation is noted from the mid-distal to distal femoral artery measuring   1.26 cm pre dilatation to 2.07 cm at the largest diameter. A dilatation is noted in the proximal to mid popliteal artery measuring 0.9 cm   pre dilatation to 1.32 cm at the largest diameter. The mid PTA appears to be occluded with reconstitution of multiphasic flow via   collateralization at the distal segment. The mid JAMIL appears to be occluded with reconstitution of multiphasic flow via   collateralization at the ankle. The proximal to mid JAMIL was not visualized. Multiphasic flow is noted in the peroneal at the ankle. Left   Left IRAJ: 1.39. This is consistent with non-compressible vessels due to   arterial calcification. CTA abd/aorta w/ runoff 9/10/21:  Impression   Nonvascular-       Large, 15.2 cm left renal mass, typical of renal cell carcinoma.       There is local metastasis with an adrenal nodule and perinephric nodes.       Several left basilar pulmonary nodules measuring up to 9 mm, suspicious for   metastasis.       Suspected osteolytic metastases, involving the left inferior pubic ramus,   left ilium and left femoral diaphysis.       Vascular-       Aortoiliac inflow is preserved.  There is no abdominal aortic aneurysm.       Bilateral superficial femoral arteries are widely patent and ectatic.       Bilateral fusiform aneurysms of the popliteal arteries.  On the right, no   flow is noted in the lower popliteal or trifurcation vessels.  On the left   there is a moderate stenosis of the inferior popliteal, although there is   distal runoff via a diseased peroneal vessel. CT chest 9/11/21:     Impression   1.  Pulmonary, osseous, mediastinal and hilar prem metastasis.  If it will   change clinical management, recommend further evaluation with PET-CT. MRI brain 9/11/21:     Impression   No acute intracranial abnormality.  No mass effect or abnormal intracranial   enhancement to suggest intracranial metastasis.       Minimal chronic microvascular disease.       Minimal parenchymal volume loss.       Moderate bilateral mastoid effusions.             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 Mitzi Fischer. NPO since midnight. Heparin drip stopped at 5 am.   Continue IVFs, creatinine 1.0, nephrology following. Will limit contrast exposure. Smoking cessation. D/w patient and agreeable to above plan. Patient educated on plan of care and disease process. All questions answered.         Electronically signed by GELY Lieberman CNP on 9/15/2021 at 8:55 AM

## 2021-09-15 NOTE — PROGRESS NOTES
Nephrology Progress  Note  [phone]  [phone]   [email or web address].        Reason for Consult:  SYL   The patient is a 73 y.o. male with significant past medical history of hypertension, gout, diverticulitis, who presented to the ER on 9/10/2021 with complaints of right leg pain. Apparently has had the right leg pain for about a week prior to the ER visit and had been seen by his PCP who ordered an ultrasound. The ultrasound revealed bilateral popliteal artery aneurysms. On 910 he underwent a CTA abdominal aortogram with runoff which noticed a large left renal mass with meta stasis to the adrenal gland lymph nodes. He again underwent a CT chest with contrast on 9/11. His serum creatinine on 910 was 0.7 mg/dL yesterday it was 1.3 and today is 1.2. He has no prior history of kidney disease kidney stone disease. There is no history of diabetes. . He has never had proteinuria or gross hematuria. He does have history of hypertension. Has been taking NSAID - naproxen     Interval History:    Pain under control   No dysuria/gross hematuria   For Lower limbs angiogram     PHYSICAL EXAM:    Vitals:    /70   Pulse 74   Temp 97.5 °F (36.4 °C) (Oral)   Resp 16   Ht 6' 2\" (1.88 m)   Wt 177 lb 7.5 oz (80.5 kg)   SpO2 93%   BMI 22.79 kg/m²   I/O last 3 completed shifts: In: 250 [P.O.:240; I.V.:10]  Out: 1300 [Urine:1300]  I/O this shift:  In: -   Out: 350 [Urine:350]    Physical Exam:  Gen:  alert, oriented x 3  PERRL , EOM +  Pallor +, No icterus  JVP not raised   CV: RRR no murmur or rub . Lungs:B/ L air entry, Normal breath sounds   Abd: soft, bowel sounds + , No organomegaly , Rt subcostal scar ( liver abscess) , Inguinal area scar , bulge left side( did not palpate on left )  Ext: No edema, no cyanosis,  Skin: Warm.   No rash  Neuro: no focal deficit       DATA:    CBC with Differential:    Lab Results   Component Value Date    WBC 5.4 09/15/2021    RBC 3.25 09/15/2021    HGB 7.3 09/15/2021    HCT 22.5 09/15/2021     09/15/2021    MCV 69.3 09/15/2021    MCH 22.4 09/15/2021    MCHC 32.2 09/15/2021    RDW 15.1 09/15/2021    SEGSPCT 77.8 03/24/2011    LYMPHOPCT 14.2 09/10/2021    MONOPCT 9.8 09/10/2021    EOSPCT 0.7 03/24/2011    BASOPCT 0.7 09/10/2021    MONOSABS 0.6 09/10/2021    LYMPHSABS 0.9 09/10/2021    EOSABS 0.1 09/10/2021    BASOSABS 0.0 09/10/2021    DIFFTYPE Auto 03/24/2011     CMP:    Lab Results   Component Value Date     09/15/2021    K 4.8 09/15/2021    K 4.4 09/14/2021     09/15/2021    CO2 26 09/15/2021    BUN 14 09/15/2021    CREATININE 1.0 09/15/2021    GFRAA >60 09/15/2021    GFRAA >60 03/24/2011    AGRATIO 0.8 09/10/2021    LABGLOM >60 09/15/2021    GLUCOSE 112 09/15/2021    PROT 8.0 09/10/2021    PROT 5.8 03/24/2011    LABALBU 2.9 09/15/2021    CALCIUM 10.3 09/15/2021    BILITOT 0.3 09/10/2021    ALKPHOS 85 09/10/2021    AST 7 09/10/2021    ALT <5 09/10/2021     Magnesium:  No results found for: MG  Phosphorus:    Lab Results   Component Value Date    PHOS 3.7 09/15/2021     Uric Acid:    Lab Results   Component Value Date    LABURIC 5.8 07/05/2014     U/A:    Lab Results   Component Value Date    COLORU YELLOW 09/13/2021    PROTEINU Negative 09/13/2021    PHUR 6.0 09/13/2021    WBCUA 26 09/13/2021    RBCUA 3 09/13/2021    CLARITYU CLOUDY 09/13/2021    SPECGRAV 1.010 09/13/2021    LEUKOCYTESUR SMALL 09/13/2021    UROBILINOGEN 0.2 09/13/2021    BILIRUBINUR Negative 09/13/2021    BILIRUBINUR NEGATIVE 03/18/2011    BLOODU Negative 09/13/2021    GLUCOSEU Negative 09/13/2021    GLUCOSEU NEGATIVE 03/18/2011           IMPRESSION/RECOMMENDATIONS:      1. SYL: likely contrast nephropathy   UA: no proteinuria, no hematuria      FeNa < 1  , No Obstruction    Creatinine recovered 1   Will need to watch for contrast nephropathy post LE angiogram   Maintain IV fluids   Renal 9/16  -    2. Renal carcinoma with metastatic disease , anemia     3.  HTN    Controlled, Monitor     Thank you for allowing me to participate in the care of this patient. I will continue to follow along. Please call with questions.     Augusto Colon MD, MD  9/15/2021  [facility]

## 2021-09-15 NOTE — PLAN OF CARE
Problem: Falls - Risk of:  Goal: Will remain free from falls  Description: Will remain free from falls  Outcome: Met This Shift  Note: Pt is wearing the fall bracelet and yellow nonskid socks. Bed is in lowest position, locked, side rails up 2/4, and call light is within reach. Pt informed of fall risks, verbalizes understanding. Will monitor. Goal: Absence of physical injury  Description: Absence of physical injury  Outcome: Met This Shift     Problem: Tissue Perfusion - Renal, Altered:  Goal: Serum creatinine will be within specified parameters  Description: Serum creatinine will be within specified parameters  Outcome: Met This Shift  Goal: Ability to achieve a balanced intake and output will improve  Description: Ability to achieve a balanced intake and output will improve  Outcome: Met This Shift     Problem: Pain:  Goal: Control of acute pain  Description: Control of acute pain  Outcome: Ongoing  Note:    Pt c/o pain. Pt assessed for breathing and BP. Pt educated on pain scale. Medication administered, see MAR. Pt repositioned. Stimuli reduced. Rest promoted. Pain reassessed. Will continue to monitor.

## 2021-09-16 NOTE — PROGRESS NOTES
Data- discharge order received, pt verbalized agreement to discharge, disposition to previous residence, no needs for HHC/DME. Action- discharge instructions prepared and given to patient and daughter, pt verbalized understanding. Medication information packet given r/t NEW and/or CHANGED prescriptions emphasizing name/purpose/side effects, pt verbalized understanding. Discharge instruction summary: Diet- general, Activity- as tolerated, Primary Care Physician as followsNahum Clark -[phone] f/u appointment within 1 week, immunizations reviewed and updated, prescription medications filled by patient. Inpatient surgical procedure precautions reviewed: angiogram.     1. WEIGHT: Admit Weight: 179 lb (81.2 kg) (09/10/21 1757)        Today  Weight: 177 lb 7.5 oz (80.5 kg) (09/13/21 0515)       2. O2 SAT.: SpO2: 95 % (09/16/21 1207)    Response- Pt belongings gathered, IV removed. Disposition is home (no HHC/DME needs), transported with daughter, taken to lobby via w/c w/ PCA, no complications.

## 2021-09-16 NOTE — PROGRESS NOTES
Vascular Progress Note    9/16/2021 9:01 AM    Chief complaint / Reason for visit : right leg claudication, popliteal artery aneurysm     Subjective:  Patient sitting up in bed. He is doing okay with no new complaints. He does complain of right leg pain when standing or ambulating. Appears hemodynamically stable, afebrile.      Vital Signs: /61   Pulse 85   Temp 97.9 °F (36.6 °C) (Oral)   Resp 16   Ht 6' 2\" (1.88 m)   Wt 177 lb 7.5 oz (80.5 kg)   SpO2 95%   BMI 22.79 kg/m²  O2 Flow Rate (L/min): 1.5 L/min   I/O:      Intake/Output Summary (Last 24 hours) at 9/16/2021 0901  Last data filed at 9/15/2021 2340  Gross per 24 hour   Intake 240 ml   Output 900 ml   Net -660 ml       Physical Exam:   General: no apparent distress, appears stated age  Chest/Lungs: no accessory muscle use  Cardiac:  regular rate and rhythm  Vascular: 1+ right DP pulse  Extremities: feet warm, no signs of cyanosis or ischemia, no significant edema, bilateral upper and lower extremity motorsensory intact  Skin: left groin site soft with no hematoma or drainage     Labs:   Lab Results   Component Value Date     09/16/2021     09/16/2021    K 5.0 09/16/2021    K 5.0 09/16/2021    K 4.4 09/14/2021     09/16/2021     09/16/2021    CO2 25 09/16/2021    CO2 26 09/16/2021    BUN 12 09/16/2021    BUN 11 09/16/2021    CREATININE 0.9 09/16/2021    CREATININE 0.8 09/16/2021    GFRAA >60 09/16/2021    GFRAA >60 09/16/2021    GFRAA >60 03/24/2011    LABGLOM >60 09/16/2021    LABGLOM >60 09/16/2021    GLUCOSE 97 09/16/2021    GLUCOSE 92 09/16/2021    PHOS 3.8 09/16/2021    CALCIUM 10.4 09/16/2021    CALCIUM 10.3 09/16/2021     Lab Results   Component Value Date    WBC 4.9 09/16/2021    RBC 3.18 09/16/2021    HGB 7.1 09/16/2021    HCT 22.6 09/16/2021    MCV 70.9 09/16/2021    RDW 15.2 09/16/2021     09/16/2021     Lab Results   Component Value Date    INR 1.21 (H) 09/13/2021    PROTIME 13.8 (H) 09/13/2021 Imaging:    Arterial duplex right leg 9/13/21:  Tech Comments   Right   Right IRAJ: 1.39. This is consistent with non-compressible vessels due to   arterial calcification. Ultrasound images of the right lower extremity reveal mild to moderate   calcific plaque formation, calcified vessel walls and shadowing throughout. A dilatation is noted from the mid-distal to distal femoral artery measuring   1.26 cm pre dilatation to 2.07 cm at the largest diameter. A dilatation is noted in the proximal to mid popliteal artery measuring 0.9 cm   pre dilatation to 1.32 cm at the largest diameter. The mid PTA appears to be occluded with reconstitution of multiphasic flow via   collateralization at the distal segment. The mid JAMIL appears to be occluded with reconstitution of multiphasic flow via   collateralization at the ankle. The proximal to mid JAMIL was not visualized. Multiphasic flow is noted in the peroneal at the ankle. Left   Left IRAJ: 1.39. This is consistent with non-compressible vessels due to   arterial calcification. CTA abd/aorta w/ runoff 9/10/21:  Impression   Nonvascular-       Large, 15.2 cm left renal mass, typical of renal cell carcinoma.       There is local metastasis with an adrenal nodule and perinephric nodes.       Several left basilar pulmonary nodules measuring up to 9 mm, suspicious for   metastasis.       Suspected osteolytic metastases, involving the left inferior pubic ramus,   left ilium and left femoral diaphysis.       Vascular-       Aortoiliac inflow is preserved.  There is no abdominal aortic aneurysm.       Bilateral superficial femoral arteries are widely patent and ectatic.       Bilateral fusiform aneurysms of the popliteal arteries.  On the right, no   flow is noted in the lower popliteal or trifurcation vessels.  On the left   there is a moderate stenosis of the inferior popliteal, although there is   distal runoff via a diseased peroneal vessel.        CT chest 9/11/21:     Impression   1. Pulmonary, osseous, mediastinal and hilar prem metastasis.  If it will   change clinical management, recommend further evaluation with PET-CT. MRI brain 9/11/21:     Impression   No acute intracranial abnormality.  No mass effect or abnormal intracranial   enhancement to suggest intracranial metastasis.       Minimal chronic microvascular disease.       Minimal parenchymal volume loss.       Moderate bilateral mastoid effusions.             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 Would recommend patient start treatment for metastatic renal cell carcinoma and we will follow up with patient in 8 weeks as outpatient to see his progress. Continue with Aspirin 81 mg daily and statin therapy. Smoking cessation. Okay to discharge from vascular standpoint. Follow up with Dr. Waylon Chamorro on 11/9 at 9:45 am or sooner if symptoms worsen which were discussed with patient. Patient is stable from vascular standpoint.   We will sign off for now, but please do not hesitate to contact us with any questions. Thank you for the consultation. Patient educated on plan of care and disease process. All questions answered.         Electronically signed by GELY Sargent CNP on 9/16/2021 at 9:01 AM

## 2021-09-16 NOTE — PROGRESS NOTES
Nephrology Progress  Note  [phone]   [email or web address].        Reason for Consult:  SYL   The patient is a 73 y.o. male with significant past medical history of hypertension, gout, diverticulitis, who presented to the ER on 9/10/2021 with complaints of right leg pain. Apparently has had the right leg pain for about a week prior to the ER visit and had been seen by his PCP who ordered an ultrasound. The ultrasound revealed bilateral popliteal artery aneurysms. 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 He again underwent a CT chest with contrast on 9/11. His serum creatinine on 9/10 was 0.7 mg/dL  He has no prior history of kidney disease kidney stone disease. Has been taking NSAID - naproxen     Subjective/Interval History    Pt seen and examined  Creatinine stable and less than 1  Possible discharge today  BPs controlled  Has been afebrile  On room air    PHYSICAL EXAM:    Vitals:    /61   Pulse 85   Temp 97.9 °F (36.6 °C) (Oral)   Resp 16   Ht 6' 2\" (1.88 m)   Wt 177 lb 7.5 oz (80.5 kg)   SpO2 95%   BMI 22.79 kg/m²   I/O last 3 completed shifts: In: 240 [P.O.:240]  Out: 6422 [VIEOV:9345]  I/O this shift:  In: 2753.5 [P.O.:120; I.V.:2633.5]  Out: -     Physical Exam:  Gen:  alert, oriented x 3  PERRL , EOM +  Pallor +, No icterus  JVP not raised   CV: RRR no murmur or rub . Lungs:B/ L air entry, Normal breath sounds   Abd: soft, bowel sounds + , No organomegaly , Rt subcostal scar ( liver abscess) , Inguinal area scar , bulge left side( did not palpate on left )  Ext: No edema, no cyanosis,  Skin: Warm.   No rash    DATA:    CBC with Differential:    Lab Results   Component Value Date    WBC 4.9 09/16/2021    RBC 3.18 09/16/2021    HGB 7.1 09/16/2021    HCT 22.6 09/16/2021     09/16/2021    MCV 70.9 09/16/2021    MCH 22.3 09/16/2021    MCHC 31.5 09/16/2021    RDW 15.2 Renal mass left  With bone lesions and mediastinal LAD  Suspicious for RCC metastatic  Biopsy from right iliac bone showed poorly differentiated carcinoma  Plans for combination immunotherapy as out-pt  Needs follow-up with Dr. Astrid Lieberman    3. HTN    Controlled, Monitor   4. Bilateral popliteal artery aneurysm   With right leg claudication s/p angiogram showing right popliteal artery aneurysm with distal tibial embolism  Plans for further intervention per vascular surgery    Plan  Ok to discharge from renal perspective  Can follow-up as out-pt with Dr. Jenniffer Rubinstein. Thank you for allowing me to participate in the care of this patient. I will continue to follow along. Please call with questions.     Alejo East MD, MD  9/16/2021  [facility]

## 2021-09-16 NOTE — DISCHARGE SUMMARY
condition today.       Discharge Medications:   Current Discharge Medication List      START taking these medications    Details   atorvastatin (LIPITOR) 40 MG tablet Take 1 tablet by mouth nightly  Qty: 30 tablet, Refills: 3      aspirin 81 MG EC tablet Take 1 tablet by mouth daily  Qty: 30 tablet, Refills: 3           Current Discharge Medication List      CONTINUE these medications which have CHANGED    Details   HYDROcodone-acetaminophen (NORCO) 5-325 MG per tablet Take 1 tablet by mouth every 6 hours as needed for Pain for up to 7 days. Qty: 20 tablet, Refills: 0    Comments: Reduce doses taken as pain becomes manageable  Associated Diagnoses: Popliteal artery aneurysm (HCC)           Current Discharge Medication List      CONTINUE these medications which have NOT CHANGED    Details   nabumetone (RELAFEN) 500 MG tablet Take 500 mg by mouth 2 times daily      isosorbide mononitrate (IMDUR) 30 MG extended release tablet Take 30 mg by mouth daily      MULTIPLE VITAMIN PO Take 1 tablet by mouth daily. Current Discharge Medication List      STOP taking these medications       meloxicam (MOBIC) 15 MG tablet Comments:   Reason for Stopping:               Discharge ROS:  A complete review of systems was asked and negative     Discharge Exam:    /66   Pulse 70   Temp 98 °F (36.7 °C) (Tympanic)   Resp 16   Ht 6' 2\" (1.88 m)   Wt 177 lb 7.5 oz (80.5 kg)   SpO2 94%   BMI 22.79 kg/m²   General appearance:  NAD  HEENT:   Normal cephalic, atraumatic, moist mucous membranes, no oropharyngeal erythema or exudate  Neck: Supple, trachea midline, no anterior cervical or SC LAD  Heart[de-identified] Normal S1/S2, no S3 or S4 RRR, no murmurs or rubs. Lungs:  Clear to auscultation bilaterally, No wheeze, rales or rhonchi noted.   Abdomen: Soft, non-tender, non-distended,no organomegaly noted,  bowel sounds present, no masses  Musculoskeletal: Grossly intact,muscle strength equal and moves all four extremities 5/5 strength  Extremities: No clubbing, no cyanosis,no peripheral edema noted  Skin: No lesion or masses  Neurologic:  Neurovascularly intact without any focal sensory/motor deficits. Cranial nerves: II-XII intact, grossly non-focal.  Psychiatric:  A & O x3        Labs: For convenience and continuity at follow-up the following most recent labs are provided:    Lab Results   Component Value Date    WBC 5.4 09/15/2021    HGB 7.3 09/15/2021    HCT 22.5 09/15/2021    MCV 69.3 09/15/2021     09/15/2021     09/15/2021    K 4.8 09/15/2021    K 4.4 09/14/2021     09/15/2021    CO2 26 09/15/2021    BUN 14 09/15/2021    CREATININE 1.0 09/15/2021    CALCIUM 10.3 09/15/2021    PHOS 3.7 09/15/2021    ALKPHOS 85 09/10/2021    ALT <5 09/10/2021    AST 7 09/10/2021    BILITOT 0.3 09/10/2021    LABALBU 2.9 09/15/2021    LDLCALC 48 09/11/2021    TRIG 95 09/11/2021     Lab Results   Component Value Date    INR 1.21 (H) 09/13/2021    INR 1.43 (H) 09/10/2021    INR 1.10 03/18/2011       Radiology:  XR TIBIA FIBULA RIGHT (2 VIEWS)    Result Date: 9/6/2021  EXAMINATION: 2 XRAY VIEWS OF THE RIGHT TIBIA AND FIBULA 9/6/2021 4:30 pm COMPARISON: None. HISTORY: ORDERING SYSTEM PROVIDED HISTORY: leg pain since yesterday TECHNOLOGIST PROVIDED HISTORY: Reason for exam:->leg pain since yesterday Reason for Exam: leg pain Acuity: Acute Type of Exam: Initial FINDINGS: No acute osseous abnormality noted. Mild soft tissue swelling noted anteriorly over the lower leg. Peripheral vascular calcifications noted. Spurring at the plantar fascial attachment to the calcaneus noted. Soft tissue swelling. No acute osseous abnormality. CT CHEST W CONTRAST    Result Date: 9/11/2021  EXAMINATION: CT OF THE CHEST WITH CONTRAST 9/11/2021 10:41 am TECHNIQUE: CT of the chest was performed with the administration of intravenous contrast. Multiplanar reformatted images are provided for review.  Dose modulation, iterative reconstruction, and/or weight based adjustment of the mA/kV was utilized to reduce the radiation dose to as low as reasonably achievable. COMPARISON: 05/04/2007 HISTORY: ORDERING SYSTEM PROVIDED HISTORY: kidney cancer TECHNOLOGIST PROVIDED HISTORY: Reason for exam:->kidney cancer Reason for Exam: kidney cancer Acuity: Acute Type of Exam: Initial FINDINGS: Mediastinum: Coronary artery calcifications are a marker of atherosclerosis. However, there are multiple enlarged mediastinal and bilateral hilar lymph nodes concerning for prem metastasis. An index left infrahilar lymph node measures 2.7 x 3.3 cm. Lungs/pleura: Mucous plugging is present within the subsegmental left lower lobe bronchus. There is no pneumothorax. A trace left pleural effusion with bibasilar atelectasis is noted. However, there are multiple solid bilateral pulmonary nodules consistent with metastatic disease. An index pulmonary nodule is present along the right minor fissure measuring 2.2 x 2.4 cm. In addition, there is subtle ground-glass opacities within the bilateral lower lobe likely due to pneumonitis. Upper Abdomen: No change in the 3.7 x 3.7 cm left adrenal mass concerning for metastatic disease. The left renal mass is incompletely imaged. Soft Tissues/Bones: Degenerative changes involve the thoracic spine. However, there are multiple bony destructive lesions throughout the axial skeleton including the bilateral ribs and thoracic spine. An index lesion within the right T2 vertebral body extending into the pedicle measures 1.8 x 1.9 cm.     1. Pulmonary, osseous, mediastinal and hilar prem metastasis. If it will change clinical management, recommend further evaluation with PET-CT. CT GUIDED NEEDLE PLACEMENT    Result Date: 9/13/2021  PROCEDURE: CT GUIDED BONE MARROW ASPIRATION AND CORE NEEDLE BONE BIOPSY OF THE LEFT ILIAC BONE.  MODERATE CONSCIOUS SEDATION 9/13/2021 HISTORY: ORDERING SYSTEM PROVIDED HISTORY: VERY LARGE renal mass TECHNOLOGIST PROVIDED HISTORY: Reason for exam:->renal mass Reason for Exam: Renal Mass-Iliac Bone Biopsy Acuity: Unknown Type of Exam: Unknown SEDATION: Intravenous sedation was administered with continuous monitoring throughout the procedure. In measured doses, a total of intravenous , 3 mg intravenous Versed and 100 mcg intravenous fentanyl was administered. My total procedure time with moderate sedation was 15 minutes. ESTIMATED BLOOD LOSS: None. TECHNIQUE: CT imaging was utilized with dose modulation, iterative reconstruction, and/or weight based adjustment of the mA/kv was utilized to reduce the radiation dose to as low as reasonably achievable. The risks and benefits of the procedure were explained. Informed consent was obtained. The patient was continuously monitored. Mild intravenous sedation and analgesia was administered. The patient was scanned in the PRONE position. The preliminary axial images through the pelvis demonstrate subtle predominately lytic lesion involving the left ilium paralleling much of the SI joint with extension into the iliac wing. There is an apparent pathologic fracture focally as well. This entire area was targeted successfully. An appropriate location was chosen and the skin was sterilely prepared and infiltrated with 1% lidocaine. Deep lidocaine was administered via a 22-gauge spinal needle to the level of the periosteum. An 11-gauge Easy Vino drill operated bone biopsy needle was advanced through the cortex towards the lesion. The destructive lesion was entered. The OnControl guiding needle was used for a single 11 gauge 5 cm core biopsy specimens placed into formalin and sent to the pathology department. Localized hemostasis was maintained with compression and a sterile bandage placed. The patient was in quickly transferred to the cart lying on the biopsy site. Successful core biopsies of left iliac wing destructive metastatic lesion with CT guidance.      VL DUP LOWER EXTREMITY ARTERIES RIGHT    Result Date: 9/14/2021  Vascular Lower Extremities Arterial Duplex and Lower Arterial Plethysmography Procedure  Demographics   Patient Name       Mendez Temple   Date of Study      09/13/2021         Gender              Male   Patient Number     [id number]         Date of Birth       1950   Visit Number       [id number]          Age                 73 year(s)   Accession Number   [id number]         Room Number         1663   Corporate ID       [id number]           Apoorva Sour,                                                            [address]   Ordering Physician Ashok Goodman, Interpreting        Lauro Galeano MD                     CNP                Physician  Procedure Type of Study:   Extremities Arteries:Lower Extremities Arterial Duplex, VL LOWER EXTREMITY  ARTERIES DUPLEX RIGHT, Lower Arterial Plethysmography, VL ANKLE / BRACHIAL  INDICES EXTREMITY COMPLETE. Vascular Sonographer Report  Additional Indications:Symptomatic right popliteal aneurysm, right leg claudication Impressions Right Impression Right IRAJ: 1.39. This is consistent with non-compressible vessels due to arterial calcification. Ultrasound images of the right lower extremity reveal mild to moderate calcific plaque formation, calcified vessel walls and shadowing throughout. A dilatation is noted from the mid-distal to distal femoral artery measuring 1.26 cm pre dilatation to 2.07 cm at the largest diameter. A dilatation is noted in the proximal to mid popliteal artery measuring 0.9 cm pre dilatation to 1.32 cm at the largest diameter. The mid PTA appears to be occluded with reconstitution of multiphasic flow via collateralization at the distal segment. The mid JAMIL appears to be occluded with reconstitution of multiphasic flow via collateralization at the ankle. The proximal to mid JAMIL was not visualized. Multiphasic flow is noted in the peroneal at the ankle. Left Impression Left IRAJ: 1.39.  This is consistent with non-compressible vessels due to arterial calcification. Conclusions   Summary   Bilateral IRAJ 1.39  Right AT and PT are occluded with multiphasic reconstitution distally. 1.32 cm right popliteal aneurysm   Signature   ------------------------------------------------------------------  Electronically signed by Dennis Hoffman MD (Interpreting  physician) on 09/14/2021 at 12:21 PM  ------------------------------------------------------------------  Patient Status:Routine. [address] [place] [address] Vascular Lab. Technical Quality:Poor visualization. Velocities are measured in cm/s ; Diameters are measured in mm LE Duplex Measurements +-------------------++-----+-----+-----------++---+-----+----------+ ! ! !Right! ! Left       !!   !     !          ! +-------------------++-----+-----+-----------++---+-----+----------+ ! Location           ! !PSV  ! Ratio! Wave Desc. !!PSV! Ratio! Wave Desc.! +-------------------++-----+-----+-----------++---+-----+----------+ ! Prox Common Femoral!!99.6 ! ! Multiphasic!!   !     !          ! +-------------------++-----+-----+-----------++---+-----+----------+ ! Dist Common Femoral!!74.1 ! ! Multiphasic!!   !     !          ! +-------------------++-----+-----+-----------++---+-----+----------+ ! PFA                ! !54.9 ! ! Multiphasic!!   !     !          ! +-------------------++-----+-----+-----------++---+-----+----------+ ! Prox SFA           !!77.9 !1.05 ! Multiphasic!!   !     !          ! +-------------------++-----+-----+-----------++---+-----+----------+ ! Mid SFA            !!89.4 !1.15 ! Multiphasic!!   !     !          ! +-------------------++-----+-----+-----------++---+-----+----------+ ! Dist SFA           !!30.4 !0.34 ! Multiphasic!!   !     !          ! +-------------------++-----+-----+-----------++---+-----+----------+ ! Prox Popliteal     !!46.4 !1.53 ! Multiphasic!!   !     !          ! +-------------------++-----+-----+-----------++---+-----+----------+ ! Dist Popliteal     !!61.5 !1.33 ! Multiphasic!!   !     !          ! +-------------------++-----+-----+-----------++---+-----+----------+ ! Prox PTA           !!94.1 !1.53 ! Multiphasic!!   !     !          ! +-------------------++-----+-----+-----------++---+-----+----------+ ! Mid PTA            !!14.7 !0.16 !           !!   !     !          ! +-------------------++-----+-----+-----------++---+-----+----------+ ! Dist PTA           !!56   !3.81 ! Multiphasic!!   !     !          ! +-------------------++-----+-----+-----------++---+-----+----------+ ! Dist JAMIL           !!65.2 !1.06 !           !!   !     !          ! +-------------------++-----+-----+-----------++---+-----+----------+ ! Prox Peroneal      !!124  !1.32 ! Multiphasic!!   !     !          ! +-------------------++-----+-----+-----------++---+-----+----------+ ! Mid Peroneal       !!129  !1.04 ! Multiphasic!!   !     !          ! +-------------------++-----+-----+-----------++---+-----+----------+ ! Dist Peroneal      !!97.3 !0.75 ! Multiphasic!!   !     !          ! +-------------------++-----+-----+-----------++---+-----+----------+ Velocities are measured in cm/s ; Diameters are measured in mm Pressures +--------++--------+-----+----+--------+-----+ ! ! !Right   ! ! Left!        !     ! +--------++--------+-----+----+--------+-----+ ! Location! !Pressure! Ratio! !Pressure! Ratio! +--------++--------+-----+----+--------+-----+ ! DP      !!156     !1.39 !    !140     ! 1.25 ! +--------++--------+-----+----+--------+-----+ ! Ankle PT!!136     !1.21 !    !156     !1.39 ! +--------++--------+-----+----+--------+-----+   - Brachial Pressure:Right: 106. Left:112.   - IRAJ:Right: 1.39. Left: 1.39. Right Plethysmographic Results   - Right Brachial Pressure:106.   - Right IRAJ:1.39. Left Plethysmographic Results   - Left Brachial Pressure:112. - Left IRAJ:1.39.     VL Extremity Venous Right    Result Date: 9/8/2021  Lower Extremities DVT Study  Demographics   Patient Name       Joel Brunner   Date of Study      09/07/2021        Gender              Male   Patient Number     [id number]        Date of Birth       1950   Visit Number       [id number]         Age                 73 year(s)   Accession Number   [id number]        Room Number   Corporate ID       [id number]          Esvin Giordano, [facility]   Ordering Physician Oz Garcia, [address]  Interpreting        Physician DEVORA Horne  Procedure Type of Study:   Veins:Lower Extremities DVT Study, VL EXTREMITY VENOUS DUPLEX RIGHT. Vascular Sonographer Report  Indications for Study:Leg pain. Additional Indications:Patient states he woke up Saturday with severe leg pain. Elevated d-dimer 1500. Impressions Right Impression No evidence of deep vein or superficial vein thrombosis involving the right lower extremity and the left common femoral vein. Incidental finding on the right: proximal popliteal artery appears dilated at 2 cm by 2 cm. Conclusions   Summary   No evidence of deep vein or superficial vein thrombosis involving the right  lower extremity. Incidental finding on the right: popliteal aneurysm (2 cm in size)   Signature   ------------------------------------------------------------------  Electronically signed by Vero Garcia MD (Interpreting  physician) on 09/08/2021 at 04:52 PM  ------------------------------------------------------------------  Patient Status:Routine. Study [address] Vascular Lab. Technical Quality:Adequate visualization. Velocities are measured in cm/s ; Diameters are measured in mm Right Lower Extremities DVT Study Measurements Right 2D Measurements +------------------------+----------+---------------+----------+ ! Location                ! Visualized! Compressibility! Thrombosis! +------------------------+----------+---------------+----------+ ! Sapheno Femoral Junction! Yes       ! Yes            ! None      ! +------------------------+----------+---------------+----------+ ! GSV Thigh               ! Yes       ! Yes            ! None      ! +------------------------+----------+---------------+----------+ ! Common Femoral          !Yes       ! Yes            ! None      ! +------------------------+----------+---------------+----------+ ! Prox Femoral            !Yes       ! Yes            ! None      ! +------------------------+----------+---------------+----------+ ! Mid Femoral             !Yes       ! Yes            ! None      ! +------------------------+----------+---------------+----------+ ! Dist Femoral            !Yes       ! Yes            ! None      ! +------------------------+----------+---------------+----------+ ! Deep Femoral            !Yes       ! Yes            ! None      ! +------------------------+----------+---------------+----------+ ! Popliteal               !Yes       ! Yes            ! None      ! +------------------------+----------+---------------+----------+ ! GSV Below Knee          ! Yes       ! Yes            ! None      ! +------------------------+----------+---------------+----------+ ! Gastroc                 ! Yes       ! Yes            ! None      ! +------------------------+----------+---------------+----------+ ! Soleal                  !Yes       ! Yes            ! None      ! +------------------------+----------+---------------+----------+ ! PTV                     ! Yes       ! Yes            ! None      ! +------------------------+----------+---------------+----------+ ! ATV                     ! Yes       ! Yes            ! None      ! +------------------------+----------+---------------+----------+ ! Peroneal                !Yes       ! Yes            ! None      ! +------------------------+----------+---------------+----------+ ! GSV Calf                ! Yes       ! Yes            ! None      ! +------------------------+----------+---------------+----------+ ! SSV                     ! Yes       ! Yes            ! None      ! +------------------------+----------+---------------+----------+ Right Doppler Measurements +--------------+------+------+------------+ ! Location      ! Signal!Reflux! Reflux (sec)! +--------------+------+------+------------+ ! Common Femoral!Phasic! No    !            ! +--------------+------+------+------------+ ! Popliteal     !Phasic! No    !            ! +--------------+------+------+------------+ Left Lower Extremities DVT Study Measurements Left 2D Measurements +--------------+----------+---------------+----------+ ! Location      ! Visualized! Compressibility! Thrombosis! +--------------+----------+---------------+----------+ ! Common Femoral!Yes       ! Yes            ! None      ! +--------------+----------+---------------+----------+ Left Doppler Measurements +--------------+------+------+------------+ ! Location      ! Signal!Reflux! Reflux (sec)! +--------------+------+------+------------+ ! Common Femoral!Phasic! No    !            ! +--------------+------+------+------------+    CTA ABDOMINAL AORTA W BILAT RUNOFF W CONTRAST    Result Date: 9/10/2021  EXAMINATION: CTA OF THE AORTA WITH LOWER EXTREMITY RUNOFF 9/10/2021 7:03 pm TECHNIQUE: CTA of the pelvis and bilateral lower extremities was performed after the administration of intravenous contrast.   Multiplanar reformatted images are provided for review. MIP images are provided for review. Dose modulation, iterative reconstruction, and/or weight based adjustment of the mA/kV was utilized to reduce the radiation dose to as low as reasonably achievable.  COMPARISON: 05/07/2007 HISTORY: ORDERING SYSTEM PROVIDED HISTORY: ? popliteal anyeursym TECHNOLOGIST PROVIDED HISTORY: Reason for exam:->? popliteal anyeursym Decision Support Exception - unselect if not a suspected or confirmed emergency medical condition->Emergency Medical Condition (MA) Reason for Exam: ? p opliteal anyeursym Acuity: Acute Type of Exam: Initial Relevant Medical/Surgical History: Leg Pain (Pt diagnosed with popliteal artery aneurysm. Follow up with vascular surgery in 2 week, but \"pain is so bad\" ) FINDINGS: Nonvascular Lower Chest: There are several nodules in the left lower lobe, measuring up to 9 mm (image 11, 21 and 25). Organs: There is mild diffuse fatty infiltration of the liver. The gallbladder and pancreas are unremarkable. Right adrenal gland is unremarkable. Right renal cysts are simple and measure up to 2.9 cm. There is an interpolar, large, hypervascular mass of the left kidney measuring 15.2 x 14.7 x 10.1 cm. There is a left adrenal mass measuring 4.2 cm in diameter. GI/Bowel: The stomach, small bowel and colon are unremarkable. Pelvis: Urinary bladder is unremarkable. There is mild-to-moderate enlargement of the prostate gland. Peritoneum/Retroperitoneum: Left perinephric nodes measure up to 1.2 cm. Bones/Soft Tissues: There is an osteolytic lesion in the left inferior pubic ramus, with erosion of the cortex and hypervascular soft tissue component. There is a lytic focus in the central left ilium versus focal osteopenia (image 118). There is posterior attenuation of the cortex of the mid femoral shaft (image 230). Right hip replacement is in normal position. VASCULAR The visualized heart is unremarkable. The celiac trunk, superior mesenteric artery and inferior mesenteric artery are widely patent. Renal arteries are widely patent. There is mild aortoiliac calcification, without significant stenosis. The common femoral arteries are widely patent. The superficial femoral arteries are irregular and ectatic with moderate calcification. There are fusiform aneurysms of the popliteal arteries bilaterally, measuring in diameter 23 mm on the right and 21 mm on the left.  On the right side, contrast ceases at the infrageniculate popliteal.  There is no opacification of the trifurcation vessels which are moderately calcified. On the left side there is moderate stenosis of the infrageniculate popliteal, with contiguous flow. The proximal peroneal and anterior tibial arteries are formed, but taper at the proximal tibia. A posterior tibial is not opacified at all. There is primary runoff via the diseased peroneal.     Nonvascular- Large, 15.2 cm left renal mass, typical of renal cell carcinoma. There is local metastasis with an adrenal nodule and perinephric nodes. Several left basilar pulmonary nodules measuring up to 9 mm, suspicious for metastasis. Suspected osteolytic metastases, involving the left inferior pubic ramus, left ilium and left femoral diaphysis. Vascular- Aortoiliac inflow is preserved. There is no abdominal aortic aneurysm. Bilateral superficial femoral arteries are widely patent and ectatic. Bilateral fusiform aneurysms of the popliteal arteries. On the right, no flow is noted in the lower popliteal or trifurcation vessels. On the left there is a moderate stenosis of the inferior popliteal, although there is distal runoff via a diseased peroneal vessel. NM BONE SCAN WHOLE BODY    Result Date: 9/13/2021  EXAMINATION: WHOLE BODY BONE SCAN  9/13/2021 TECHNIQUE: The patient was injected intravenously with 26.99 mCi of 99 mTc MDP and scintigraphy of the entire skeleton was performed approximately three hours later. COMPARISON: CT dated 09/11/2021 HISTORY: ORDERING SYSTEM PROVIDED HISTORY: cancer TECHNOLOGIST PROVIDED HISTORY: Reason for exam:->cancer Reason for Exam: Renal Cancer; R/O mets Acuity: Unknown Type of Exam: Initial FINDINGS: Motion artifact is present. Increased activity is demonstrated within the femoral diaphyses, proximal left femur, anterior left iliac bone, and throughout the thoracic and lumbar spine. Increased activity is seen within the posterior left 10th rib where lytic lesion is seen on CT as well as within a lateral lower right rib. Multifocal activity is seen within the sternum. Asymmetric activity is seen along the left sacroiliac joint. Asymmetric activity is seen at the left sternoclavicular joint. Activity at the shoulders, left greater than right knee, feet, likely degenerative. Left kidney is poorly visualized, compatible with known left renal mass. Activity is seen within the right kidney. Bladder has been masked. Multifocal osseous metastatic disease within the axial and appendicular skeleton. CT BIOPSY DEEP BONE PERCUTANEOUS    Result Date: 9/13/2021  PROCEDURE: CT GUIDED BONE MARROW ASPIRATION AND CORE NEEDLE BONE BIOPSY OF THE LEFT ILIAC BONE. MODERATE CONSCIOUS SEDATION 9/13/2021 HISTORY: ORDERING SYSTEM PROVIDED HISTORY: VERY LARGE renal mass TECHNOLOGIST PROVIDED HISTORY: Reason for exam:->renal mass Reason for Exam: Renal Mass-Iliac Bone Biopsy Acuity: Unknown Type of Exam: Unknown SEDATION: Intravenous sedation was administered with continuous monitoring throughout the procedure. In measured doses, a total of intravenous , 3 mg intravenous Versed and 100 mcg intravenous fentanyl was administered. My total procedure time with moderate sedation was 15 minutes. ESTIMATED BLOOD LOSS: None. TECHNIQUE: CT imaging was utilized with dose modulation, iterative reconstruction, and/or weight based adjustment of the mA/kv was utilized to reduce the radiation dose to as low as reasonably achievable. The risks and benefits of the procedure were explained. Informed consent was obtained. The patient was continuously monitored. Mild intravenous sedation and analgesia was administered. The patient was scanned in the PRONE position. The preliminary axial images through the pelvis demonstrate subtle predominately lytic lesion involving the left ilium paralleling much of the SI joint with extension into the iliac wing. There is an apparent pathologic fracture focally as well. This entire area was targeted successfully.  An appropriate location was chosen and the skin was sterilely prepared and infiltrated with 1% lidocaine. Deep lidocaine was administered via a 22-gauge spinal needle to the level of the periosteum. An 11-gauge OnControl drill operated bone biopsy needle was advanced through the cortex towards the lesion. The destructive lesion was entered. The OnControl guiding needle was used for a single 11 gauge 5 cm core biopsy specimens placed into formalin and sent to the pathology department. Localized hemostasis was maintained with compression and a sterile bandage placed. The patient was in quickly transferred to the cart lying on the biopsy site. Successful core biopsies of left iliac wing destructive metastatic lesion with CT guidance. MRI BRAIN W WO CONTRAST    Result Date: 9/11/2021  EXAMINATION: MRI OF THE BRAIN WITHOUT AND WITH CONTRAST  9/11/2021 12:12 pm TECHNIQUE: Multiplanar multisequence MRI of the head/brain was performed without and with the administration of intravenous contrast. COMPARISON: None. HISTORY: ORDERING SYSTEM PROVIDED HISTORY: cancer staging TECHNOLOGIST PROVIDED HISTORY: Reason for exam:->cancer staging Reason for Exam: Left renal mass; R/O mets Acuity: Unknown Type of Exam: Initial FINDINGS: INTRACRANIAL STRUCTURES/VENTRICLES:  There is minimal parenchymal volume loss. There is mild T2/FLAIR hyperintensity in the periventricular and subcortical white matter, likely related to minimal chronic microvascular disease. There is no acute infarct. No mass effect or midline shift. No acute intracranial hemorrhage. There is no hydrocephalus. The sellar/suprasellar regions appear unremarkable. The normal signal voids within the major intracranial vessels appear maintained. No abnormal focus of enhancement is seen within the brain. ORBITS: The visualized portion of the orbits demonstrate no acute abnormality. SINUSES: There is scattered minimal mucosal thickening in the paranasal sinuses.   There are moderate bilateral mastoid effusions. BONES/SOFT TISSUES: The bone marrow signal intensity appears normal. The soft tissues demonstrate no acute abnormality. No acute intracranial abnormality. No mass effect or abnormal intracranial enhancement to suggest intracranial metastasis. Minimal chronic microvascular disease. Minimal parenchymal volume loss. Moderate bilateral mastoid effusions. The patient was seen and examined on day of discharge and this discharge summary is in conjunction with any daily progress note from day of discharge. Time Spent on discharge is 45 minutes  in the examination, evaluation, counseling and review of medications and discharge plan. Signed:    Jordan Alford MD   9/16/2021      Thank you Kori Cooper MD for the opportunity to be involved in this patient's care.  If you have any questions or concerns please feel free to contact me at

## 2021-09-16 NOTE — PLAN OF CARE
Problem: Falls - Risk of:  Goal: Will remain free from falls  Description: Will remain free from falls  Outcome: Met This Shift  Note: Pt is wearing the fall bracelet and yellow nonskid socks. Bed is in lowest position, locked, side rails up 2/4, and call light is within reach. Pt informed of fall risks, verbalizes understanding. Will monitor. Goal: Absence of physical injury  Description: Absence of physical injury  Outcome: Met This Shift     Problem: Pain:  Goal: Control of acute pain  Description: Control of acute pain  Outcome: Ongoing  Note:    Pt c/o pain. Pt assessed for breathing and BP. Pt educated on pain scale. Medication administered. Pt repositioned. Stimuli reduced. Rest promoted. Pain reassessed. Will continue to monitor.

## 2021-11-03 NOTE — PROGRESS NOTES
Name_______________________________________Printed:____________________  Date and time of surgery__11/8 0945______________________Arrival Time:__0815______________   1. The instructions given regarding when and if a patient needs to stop oral intake prior to surgery varies. Follow the specific instructions you were given                  _x__Nothing to eat or to drink after Midnight the night before.                   ____Carbo loading or ERAS instructions will be given to select patients-if you have been given those instructions -please do the following                           The evening before your surgery after dinner before midnight drink 40 ounces of gatorade. If you are diabetic use sugar free. The morning of surgery drink 40 ounces of water. This needs to be finished 3 hours prior to your surgery start time. 2. Take the following pills with a small sip of water on the morning of surgery_ pain med if needed  imdur __________________________________________________                  Do not take blood pressure medications ending in pril or sartan the juan prior to surgery or the morning of surgery_   3. Aspirin, Ibuprofen, Advil, Naproxen, Vitamin E and other Anti-inflammatory products and supplements should be stopped for 5 -7days before surgery or as directed by your physician. 4. Check with your Doctor regarding stopping Plavix, Coumadin,Eliquis, Lovenox,Effient,Pradaxa,Xarelto, Fragmin or other blood thinners and follow their instructions. 5. Do not smoke, and do not drink any alcoholic beverages 24 hours prior to surgery. This includes NA Beer. Refrain from the usage of any recreational drugs. 6. You may brush your teeth and gargle the morning of surgery. DO NOT SWALLOW WATER   7. You MUST make arrangements for a responsible adult to stay on site while you are here and take you home after your surgery. You will not be allowed to leave alone or drive yourself home.   It is strongly suggested someone stay with you the first 24 hrs. Your surgery will be cancelled if you do not have a ride home. 8. A parent/legal guardian must accompany a child scheduled for surgery and plan to stay at the hospital until the child is discharged. Please do not bring other children with you. 9. Please wear simple, loose fitting clothing to the hospital.  [facility] not bring valuables (money, credit cards, checkbooks, etc.) Do not wear any makeup (including no eye makeup) or nail polish on your fingers or toes. 10. DO NOT wear any jewelry or piercings on day of surgery. All body piercing jewelry must be removed. 11. If you have ___dentures, they will be removed before going to the OR; we will provide you a container. If you wear ___contact lenses or ___glasses, they will be removed; please bring a case for them. 12. Please see your family doctor/pediatrician for a history & physical and/or concerning medications. Bring any test results/reports from your physician's office. PCP__________________Phone___________H&P Appt. Date________             13 If you  have a Living Will and Durable Power of  for Healthcare, please bring in a copy. 15. Notify your Surgeon if you develop any illness between now and surgery  time, cough, cold, fever, sore throat, nausea, vomiting, etc.  Please notify your surgeon if you experience dizziness, shortness of breath or blurred vision between now & the time of your surgery             15. DO NOT shave your operative site 96 hours prior to surgery. For face & neck surgery, men may use an electric razor 48 hours prior to surgery. 16. Shower the night before or morning of surgery using an antibacterial soap or as you have been instructed. 17. To provide excellent care visitors will be limited to one in the room at any given time. 18.  Please bring picture ID and insurance card.              19.  Visit our web site for additional information:  [email or web address]              20.During flu season no children under the age of 15 are permitted in the hospital for the safety of all patients. 21. If you take a long acting insulin in the evening only  take half of your usual  dose the night  before your procedure              22. If you use a c-pap please bring DOS if staying overnight,             23.For your convenience [facility] has a pharmacy on site to fill your prescriptions. 24. If you use oxygen and have a portable tank please bring it  with you the DOS             25. Bring a complete list of all your medications with name and dose include any supplements. 26. Other__________________________________________   *Please call pre admission testing if you any further questions   [address] [place]  [phone]   [facility] [address] GENESIS MORALES   [phone]           COVID TESTING    __x_ Covid test to be done 3-5 days prior to scheduled surgery -patient aware they are REQUIRED to bring a copy of the negative result DOS-if they receive a positive result to notify their surgeon         If known - indicate where patient is getting covid test done _11/3 MOB I put in order__________________________________________________________    ___ Rapid - DOS    ___ Other___________________________________      [place] POLICY(subject to change)    There is a one visitor policy at [facility] for all surgeries and endoscopies. Whether the visitor can stay or will be asked to wait in the car will depend on the current policy and if social distancing can be maintained. The policy is subject to change at any time. Please make sure the visitor has a cell phone that is on,charged and able to accept calls, as this may be the way that the staff communicates with them. Pain management is NO VISITOR policyThe patients ride is expected to remain in the car with a cell phone for communication. If the ride is leaving the hospital grounds please make sure they are back in time for pickup. Have the patient inform the staff on arrival what their rides plans are while the patient is in the facility. At the MAIN there is one visitor allowed. Please note that the visitor policy is subject to change. All above information reviewed with patient in person or by phone. Patient verbalizes understanding. All questions and concerns addressed.                                                                                                  Patient/Rep___per phone/pt_________________                                                                                                                                    PRE OP INSTRUCTIONS

## 2021-11-08 NOTE — OP NOTE
were no kinks within the tubing and there was good position of the catheter tip in the superior vena cava. The subcutaneous layer was closed with interrupted 3-0 Vicryl sutures followed by a running 4-0 Vicryl suture in the skin. Dermabond was applied. The patient tolerated the procedure without difficulty and was transferred to the recovery room in stable condition.         Marvin Ashby MD       Electronically signed by Marvin Ashby MD on 11/8/2021 at 9:58 AM

## 2021-11-08 NOTE — ANESTHESIA POSTPROCEDURE EVALUATION
Department of Anesthesiology  Postprocedure Note    Patient: Chris Lea  MRN: [id number]  YOB: 1950  Date of evaluation: 11/8/2021  Time:  11:01 AM     Procedure Summary     Date: 11/08/21 Room / Location: [address]    Anesthesia Start: 4059 Anesthesia Stop: 7056    Procedure: PORT A CATHETER PLACEMENT LEFT SUBCLAVIAN (Left Neck) Diagnosis: (C64.2  KIDNEY CANCER)    Surgeons: Fariha Carpio MD Responsible Provider: Dinesh Camara MD    Anesthesia Type: MAC ASA Status: 3          Anesthesia Type: MAC    Katie Phase I: Katie Score: 8    Katie Phase II:      Last vitals: Reviewed and per EMR flowsheets.        Anesthesia Post Evaluation    Patient location during evaluation: PACU  Patient participation: complete - patient participated  Level of consciousness: awake and alert  Airway patency: patent  Nausea & Vomiting: no vomiting and no nausea  Complications: no  Cardiovascular status: hemodynamically stable  Respiratory status: acceptable  Hydration status: stable  Comments: New onset a-fib in OR, perfectly stable, HR controlled, BP stable, asymptomatic in pacu, EP MD will see patient in pacu prior to discharge

## 2021-11-08 NOTE — PROGRESS NOTES
Patient to PACU from OR. Very drowsy. VSS. Patient afib/flutter on tele. Rhythm change per OR. 12 EKG ordered. Surgical dressing to left chest c/d/i.

## 2021-11-08 NOTE — PROGRESS NOTES
Patient sitting up, comfortable. Remains in afib, controlled rate, BP stable. Cardiology to see patient, Dr. Cori Escamilla notified. Patient family to bedside. Updated on POC. Phase 1 recovery completed. Will transition to phase 2 and keep in PACU.

## 2021-11-08 NOTE — CONSULTS
[facility]   Electrophysiology Consultation   Date: 11/8/2021  Reason for Consultation: New diagnosis of A. fib  Consult Requesting Physician: No att. providers found     No chief complaint on file. HPI: Isaías Cardoso is a 73 y.o. male who was admitted in September for worsening right lower extremity intermittent claudication symptoms and had CT aortogram with runoff was done and incidentally found to have right renal mass concerning for renal cell cancer with possible local metastatic disease complicated by peripheral arterial disease with claudication and occlusion of the right popliteal artery. He has been diagnosed with a stage IV renal cell carcinoma. He received a port today for chemotherapy. He has received some radiation. He had anemia and had to have transfusion. His last hemoglobin was 7.7 and we repeated it today shows slightly improved. He has history of urinary tract bleeding likely due to his cancer. He was noted to be in atrial fibrillation today. Patient gives no recent history of leg swelling and fatigue. Past Medical History:   Diagnosis Date    Arthritis     Cancer ([address]     De Quervain's tenosynovitis     Diverticulitis     Gout     HTN (hypertension) 9/11/2021        Past Surgical History:   Procedure Laterality Date    CT BIOPSY PERCUTANEOUS DEEP BONE  9/13/2021    CT BIOPSY PERCUTANE[facility]    HERNIA REPAIR      bilateral inguinal    HIP SURGERY      right hip replacement    HIP SURGERY  3/22/11    REMOVAL OF HARDWARE, RIGHT HIP REVISION, CEMENTED    LIVER SURGERY      abcess    PORT SURGERY Left 11/8/2021    PORT A CATHETER PLACEMENT LEFT SUBCLAVIAN performed by Chriss Nelson MD at [address]       No Known Allergies    Social History:  Reviewed. reports that he has quit smoking. His smoking use included cigars. He quit after 20.00 years of use.  He has never used smokeless tobacco. He reports that he does not drink alcohol and does not use drugs. Family History:  Reviewed. family history includes Cancer in his mother; Heart Disease in his maternal grandmother. Review of System:  All other systems reviewed and are negative except for that noted above. Pertinent negatives are:     · General: negative for fever, chills   · Ophthalmic ROS: negative for - eye pain or loss of vision  · ENT ROS: negative for - headaches, sore throat   · Respiratory: negative for - cough, sputum  · Cardiovascular: Reviewed in HPI  · Gastrointestinal: negative for - abdominal pain, diarrhea, N/V  · Hematology: negative for - bleeding, blood clots, bruising or jaundice  · Genito-Urinary:  negative for - Dysuria or incontinence  · Musculoskeletal: negative for - Joint swelling, muscle pain  · Neurological: negative for - confusion, dizziness, headaches   · Psychiatric: No anxiety, no depression. · Dermatological: negative for - rash    Physical Examination:  Vitals:    21 1030   BP: 104/74   Pulse: 79   Resp: 12   Temp:    SpO2: 99%      In: 500 [I.V.:500]  Out: -    Wt Readings from Last 3 Encounters:   21 164 lb 11.2 oz (74.7 kg)   21 177 lb 7.5 oz (80.5 kg)   21 174 lb 6.1 oz (79.1 kg)     Temp  Av.3 °F (36.3 °C)  Min: 97 °F (36.1 °C)  Max: 97.7 °F (36.5 °C)  Pulse  Av.5  Min: 66  Max: 81  BP  Min: 91/49  Max: 134/64  SpO2  Av.6 %  Min: 94 %  Max: 100 %  FiO2   Av.8 %  Min: 20 %  Max: 100 %    Intake/Output Summary (Last 24 hours) at 2021 1446  Last data filed at 2021 1116  Gross per 24 hour   Intake 500 ml   Output --   Net 500 ml       · Telemetry: Atrial fibrillation  · Constitutional: Oriented. No distress. · Head: Normocephalic and atraumatic. · Mouth/Throat: Oropharynx is clear and moist.   · Eyes: Conjunctivae normal. EOM are normal.   · Neck: Neck supple. No rigidity. No JVD present. · Cardiovascular: Normal rate, irregular rhythm, S1&S2. · Pulmonary/Chest: Bilateral respiratory sounds. No wheezes, No rhonchi. Left chest dressing for port  · Abdominal: Soft. Bowel sounds present. No distension, No tenderness. · Musculoskeletal: No tenderness. + edema    · Lymphadenopathy: Has no cervical adenopathy. · Neurological: Alert and oriented. Cranial nerve appears intact, No Gross deficit   · Skin: Skin is warm and dry. No rash noted. · Psychiatric: Has a normal behavior     Labs, diagnostic and imaging results reviewed. Reviewed.    Recent Labs     11/08/21  1152   *   K 5.1      CO2 24   BUN 11   CREATININE 0.7*     Recent Labs     11/08/21  1152   WBC 6.8   HGB 8.8*   HCT 28.5*   MCV 71.7*        No results found for: CKTOTAL, CKMB, CKMBINDEX, TROPONINI  No results found for: BNP  Lab Results   Component Value Date    PROTIME 13.8 09/13/2021    PROTIME 16.4 09/10/2021    PROTIME 12.0 03/18/2011    INR 1.21 09/13/2021    INR 1.43 09/10/2021    INR 1.10 03/18/2011     Lab Results   Component Value Date    CHOL 94 09/11/2021    HDL 27 09/11/2021    HDL 33 03/24/2011    TRIG 95 09/11/2021       ECG: Atrial fibrillationLeft axis deviationAbnormal   Echo:   Cath:     Scheduled Meds:   sodium chloride flush  10 mL IntraVENous 2 times per day     Continuous Infusions:   lactated ringers 125 mL/hr at 11/08/21 0835    sodium chloride       PRN Meds:.meperidine, HYDROmorphone, oxyCODONE-acetaminophen, ondansetron, labetalol, hydrALAZINE, sodium chloride flush, sodium chloride, lidocaine PF     Patient Active Problem List    Diagnosis Date Noted    Preop testing     Renal cancer, left ([address]     Anemia 09/11/2021    HTN (hypertension) 09/11/2021    Left renal mass 09/10/2021    Peripheral arterial disease ([address] 09/10/2021    Stenosis of left popliteal artery ([address] 09/10/2021    Right popliteal artery occlusion ([address] 09/10/2021    Bilateral Popliteal artery aneurysm [address] [facility] 09/10/2021    Gout 01/12/2015    Hand pain 07/16/2014  Swelling joint, finger, hand 07/16/2014    De Quervain's disease (tenosynovitis) 07/16/2014    Hip pain 09/24/2012    Dislocation of hip prosthesis [address] 03/22/2011      Active Hospital Problems    Diagnosis Date Noted    Preop testing [Z01.818]     Renal cancer, left ([facility] [C64.2]        Assessment:       Plan:    -New diagnosis of atrial fibrillation  Patient has been having more fatigue and leg swelling which could be due to atrial fibrillation. His EKG from 9/10/2021 was sinus rhythm. Therefore he is either having paroxysmal atrial fibrillation or he has recent onset persistent of less than 2 months. His [id number] score is at least 2 due to peripheral vascular disease and age and therefore he requires to be anticoagulated. I have repeated his CBC to see what his hemoglobin is and is 8.8. I think it is reasonable to start him on anticoagulation and see how he responds with close monitoring of his blood counts. Management of his atrial fibrillation and also anticoagulation to some degree will depend on his prognosis and management of his cancer. His rate is controlled and therefore there is no need for any rate control medication at this point. We will order an echocardiogram and stress test as outpatient to assess for any structural disease that can be treated. We will also do an event monitor to see if he has persistent A. fib or paroxysmal.  If he tends to be persistent then we can proceed with cardioversion later on. However obviously the renal cancer takes precedence at this point and needs to be treated and prognosis be more clear before any aggressive surgery. I will start him on Eliquis 5 mg twice daily and have him follow-up in the office.    -Peripheral vascular disease. There is occlusion of right anterior tibial and posterior tibial artery. Vascular plan is to have a bypass graft.     This will await the outcome of his cancer treatment as per vascular note.        -Hyponatremia    Adequate intake     -Left renal cancer  Stage IV 4 with metastasis. Patient is being treated with chemotherapy and radiation. Thank you for allowing me to participate in the care of Jarret Rea     NOTE: This report was transcribed using voice recognition software. Every effort was made to ensure accuracy, however, inadvertent computerized transcription errors may be present.

## 2021-11-08 NOTE — H&P
Pamelia Blocker    HPI: 73year old male with metastatic cancer    Past Medical History:   Diagnosis Date    Arthritis     Cancer ([address]     De Quervain's tenosynovitis     Diverticulitis     Gout     HTN (hypertension) 9/11/2021       Past Surgical History:   Procedure Laterality Date    CT BIOPSY PERCUTANEOUS DEEP BONE  9/13/2021    CT BIOPSY PERCUTANEOUS DEEP BONE 9/13/2021 Maxine Right, MD [facility] CT SCAN    HERNIA REPAIR      bilateral inguinal    HIP SURGERY      right hip replacement    HIP SURGERY  3/22/11    REMOVAL OF HARDWARE, RIGHT HIP REVISION, CEMENTED    LIVER SURGERY      abcess       Social History     Socioeconomic History    Marital status:      Spouse name: Not on file    Number of children: Not on file    Years of education: Not on file    Highest education level: Not on file   Occupational History    Occupation: Stock clerk   Tobacco Use    Smoking status: Former Smoker     Years: 20.00     Types: Cigars    Smokeless tobacco: Never Used    Tobacco comment: quit [id number]   Substance and Sexual Activity    Alcohol use: No     Comment: former    Drug use: No    Sexual activity: Not on file   Other Topics Concern    Not on file   Social History Narrative    Not on file     Social Determinants of Health     Financial Resource Strain:     Difficulty of Paying Living Expenses: Not on file   Food Insecurity:     Worried About [address] in the Last Year: Not on file    [address] in the Last Year: Not on file   Transportation Needs:     Lack of Transportation (Medical): Not on file    Lack of Transportation (Non-Medical):  Not on file   Physical Activity:     Days of Exercise per Week: Not on file    Minutes of Exercise per Session: Not on file   Stress:     Feeling of Stress : Not on file   Social Connections:     Frequency of Communication with Friends and Family: Not on file    Frequency of Social Gatherings with Friends and Family: Not on file  Attends [employer] Services: Not on file    Active Member of Clubs or Organizations: Not on file    Attends Club or Organization Meetings: Not on file    Marital Status: Not on file   Intimate Partner Violence:     Fear of Current or Ex-Partner: Not on file    Emotionally Abused: Not on file    Physically Abused: Not on file    Sexually Abused: Not on file   Housing Stability:     Unable to Pay for Housing in the Last Year: Not on file    Number of [place] in the Last Year: Not on file    Unstable Housing in the Last Year: Not on file       Allergies: No Known Allergies    Prior to Admission medications    Medication Sig Start Date End Date Taking? Authorizing Provider   oxyCODONE 5 MG capsule Take 5 mg by mouth every 4 hours as needed for Pain. Yes Historical Provider, MD   dexamethasone (DECADRON) 2 MG tablet Take 1 tablet by mouth daily Patient states not taking 9/23/21  Yes Historical Provider, MD   morphine (MS CONTIN) 15 MG extended release tablet Take 15 mg by mouth every 12 hours. 9/23/21  Yes Historical Provider, MD   atorvastatin (LIPITOR) 40 MG tablet Take 1 tablet by mouth nightly 9/16/21  Yes Chinyere Mercer MD   aspirin 81 MG EC tablet Take 1 tablet by mouth daily 9/16/21  Yes Chinyere Mercer MD   isosorbide mononitrate (IMDUR) 30 MG extended release tablet Take 30 mg by mouth daily   Yes Historical Provider, MD   megestrol (MEGACE) 40 MG tablet Take 40 mg by mouth daily    Historical Provider, MD   nabumetone (RELAFEN) 500 MG tablet Take 500 mg by mouth 2 times daily    Historical Provider, MD   MULTIPLE VITAMIN PO Take 1 tablet by mouth daily. 3/18/11   Historical Provider, MD       Active Problems:    * No active hospital problems. *  Resolved Problems:    * No resolved hospital problems. *      Blood pressure 110/60, pulse 81, temperature 97.7 °F (36.5 °C), temperature source Temporal, resp. rate 16, height 6' 2\" (1.88 m), weight 164 lb 11.2 oz (74.7 kg), SpO2 100 %.     Review of Systems    Physical Exam  Cardiovascular:      Rate and Rhythm: Normal rate and regular rhythm. Pulmonary:      Effort: Pulmonary effort is normal.      Breath sounds: Normal breath sounds. Assessment:  Metastatic cancer      Plan:  Placement of port-a-cath. Patient was explained the risks,benefits and possible complications of port-a-cath placement including bleeding, infection, pneumothorax and port malfunction.               Tu Marcus MD  11/8/2021

## 2021-11-08 NOTE — PROGRESS NOTES
Dr. Cuong Brooks spoke with patient and family at bedside. Labs ordered and sent. Plan to discharge home on eliquis depending on labs.

## 2021-11-08 NOTE — ANESTHESIA PRE PROCEDURE
Department of Anesthesiology  Preprocedure Note       Name:  Nader Reyes   Age:  73 y.o.  :  1950                                          MRN:  [id number]         Date:  2021      Surgeon: Anibal Estrada):  Juan M Sanchez MD    Procedure: Procedure(s):  PORT A CATHETER PLACEMENT    Medications prior to admission:   Prior to Admission medications    Medication Sig Start Date End Date Taking? Authorizing Provider   megestrol (MEGACE) 40 MG tablet Take 40 mg by mouth daily   Yes Historical Provider, MD   dexamethasone (DECADRON) 2 MG tablet Take 1 tablet by mouth daily Patient states not taking 21   Historical Provider, MD   morphine (MS CONTIN) 15 MG extended release tablet Take 15 mg by mouth every 12 hours. 21   Historical Provider, MD   atorvastatin (LIPITOR) 40 MG tablet Take 1 tablet by mouth nightly 21   Lauren Terrazas MD   aspirin 81 MG EC tablet Take 1 tablet by mouth daily 21   Lauren Terrazas MD   nabumetone (RELAFEN) 500 MG tablet Take 500 mg by mouth 2 times daily    Historical Provider, MD   isosorbide mononitrate (IMDUR) 30 MG extended release tablet Take 30 mg by mouth daily    Historical Provider, MD   MULTIPLE VITAMIN PO Take 1 tablet by mouth daily.  3/18/11   Historical Provider, MD       Current medications:    Current Facility-Administered Medications   Medication Dose Route Frequency Provider Last Rate Last Admin    meperidine (DEMEROL) injection 12.5 mg  12.5 mg IntraVENous Q5 Min PRN Jordyn Fowler MD        HYDROmorphone (DILAUDID) injection 0.5 mg  0.5 mg IntraVENous Q5 Min PRN Jordyn Fowler MD        oxyCODONE-acetaminophen (PERCOCET) 5-325 MG per tablet 1 tablet  1 tablet Oral Once PRN Jordyn Fowler MD        [facility]dansetron [facility]) injection 4 mg  4 mg IntraVENous Once PRN Jordyn Fowler MD        labetalol (NORMODYNE;TRANDATE) injection 5 mg  5 mg IntraVENous Q10 Min PRN Jordyn Fowler MD        hydrALAZINE (APRESOLINE) injection 5 mg  5 mg IntraVENous Q10 Min PRN Ashley Almanzar MD           Allergies:  No Known Allergies    Problem List:    Patient Active Problem List   Diagnosis Code    Dislocation of hip prosthesis ([facility] T84.029A, Z96.649    Hip pain M25.559    Hand pain M79.643    Swelling joint, finger, hand [id number]    De Quervain's disease (tenosynovitis) M65.4    Gout M10.9    Left renal mass N28.89    Peripheral arterial disease (HCC) I73.9    [facility] I70.202    Right popliteal artery occlusion (HCC) I70.201    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    Anemia D64.9    HTN (hypertension) I10       Past Medical History:        Diagnosis Date    Arthritis     Cancer ([facility]     De Quervain's tenosynovitis     Diverticulitis     Gout     HTN (hypertension) 9/11/2021       Past Surgical History:        Procedure Laterality Date    CT BIOPSY PERCUTANEOUS DEEP BONE  9/13/2021    CT BIOPSY PERCUTANEOUS DEEP BONE 9/13/2021 Mata Ledbetter MD [facility] CT SCAN    HERNIA REPAIR      bilateral inguinal    HIP SURGERY      right hip replacement    HIP SURGERY  3/22/11    REMOVAL OF HARDWARE, RIGHT HIP REVISION, CEMENTED    LIVER SURGERY      abcess       Social History:    Social History     Tobacco Use    Smoking status: Former Smoker     Years: 20.00     Types: Cigars    Smokeless tobacco: Never Used    Tobacco comment: quit [id number]   Substance Use Topics    Alcohol use: No     Comment: former                                Counseling given: Not Answered  Comment: quit [id number]      Vital Signs (Current):   Vitals:    11/03/21 1049   Weight: 174 lb (78.9 kg)   Height: 6' 2\" (1.88 m)                                              BP Readings from Last 3 Encounters:   10/07/21 116/65   09/16/21 117/66   09/06/21 133/82       NPO Status: Time of last liquid consumption: 2359                        Time of last solid consumption: 2359 dentures      Pulmonary:normal exam  breath sounds clear to auscultation      (-) COPD, asthma, sleep apnea and not a current smoker (former)                           Cardiovascular:    (+) hypertension:, hyperlipidemia    (-) past MI, CAD, CABG/stent, dysrhythmias,  angina and  CHF      Rhythm: regular  Rate: normal                    Neuro/Psych:      (-) seizures, TIA and CVA           GI/Hepatic/Renal: Neg GI/Hepatic/Renal ROS       (-) GERD, liver disease and no renal disease       Endo/Other:    (+) : arthritis (OA and Gout): OA., malignancy/cancer (kidney mass and metastasis). (-) diabetes mellitus, hypothyroidism, hyperthyroidism               Abdominal:             Vascular:   + PVD, aortic or cerebral (PAD LE, Bilateral popliteal artery aneurysm), . Other Findings:             Anesthesia Plan      MAC     ASA 3       Induction: intravenous. Anesthetic plan and risks discussed with patient. Plan discussed with CRNA.                   Lady Laws MD   11/8/2021

## 2021-11-09 NOTE — TELEPHONE ENCOUNTER
Area of cytotoxic cerebral edema identified when reviewing brain imaging in the territory of the left middle cerebral artery. There is mass effect associated with it. We will continue to monitor the patients clinical exam for any worsening of symptoms which may indicate expansion of the stroke or the area of the edema resulting in the clinical change. The pattern is suggestive of cardio embolic etiology.       Ordered exercise stress, echo, eliquis. Spoke with daughter Camryn Pappas. She will  30 day free trial card.

## 2021-11-09 NOTE — PROGRESS NOTES
release tablet Take 30 mg by mouth daily      MULTIPLE VITAMIN PO Take 1 tablet by mouth daily. No current facility-administered medications for this visit. Allergies:  Patient has no known allergies. Review of Systems:   · Constitutional: there has been no unanticipated weight loss. There's been no change in energy level, sleep pattern, or activity level. · Eyes: No visual changes or diplopia. No scleral icterus. · ENT: No Headaches, hearing loss or vertigo. No mouth sores or sore throat. · Cardiovascular: Reviewed in HPI  · Respiratory: No cough or wheezing, no sputum production. No hematemesis. · Gastrointestinal: No abdominal pain, appetite loss, blood in stools. No change in bowel or bladder habits. · Genitourinary: No dysuria, trouble voiding, or hematuria. · Musculoskeletal:  No gait disturbance, weakness or joint complaints. · Integumentary: No rash or pruritis. · Neurological: No headache, diplopia, change in muscle strength, numbness or tingling. No change in gait, balance, coordination, mood, affect, memory, mentation, behavior. · Psychiatric: No anxiety, no depression. · Endocrine: No malaise, fatigue or temperature intolerance. No excessive thirst, fluid intake, or urination. No tremor. · Hematologic/Lymphatic: No abnormal bruising or bleeding, blood clots or swollen lymph nodes. · Allergic/Immunologic: No nasal congestion or hives. Physical Examination:    Vitals:    11/09/21 1000   BP: 118/68          General appearance: alert, appears stated age, cooperative and no distress  1+ pitting edema bilateral lower extremity. Feet are warm.     Assessment:     Patient Active Problem List   Diagnosis    Dislocation of hip prosthesis (HCC)    Hip pain    Hand pain    Swelling joint, finger, hand    De Quervain's disease (tenosynovitis)    Gout    Left renal mass    PAD (peripheral artery disease) ([facility]    Stenosis of left popliteal artery (HCC)    Right popliteal artery occlusion (HCC)    Bilateral Popliteal artery aneurysm (HCC)    Anemia    HTN (hypertension)    Preop testing    Renal cancer, left (HCC)    PAF (paroxysmal atrial fibrillation) (HCC)    Hyponatremia       Plan:  1. Atherosclerosis of native arteries of extremities with rest pain, right leg [facility])  65-year-old male with metastatic renal cell carcinoma undergoing chemotherapy. At this point his right foot is stable. Recommend follow-up duplex imaging in 3 months. Further discussions at that time regarding possibility of bypass given overall medical condition.  - VL DUP LOWER EXTREMITY ARTERIES RIGHT; Future        Thank you for allowing me to participate in the care of this individual.  Please do not hesitate to contact me with any questions. Ewa Sanchez M.D., FACS.   11/9/2021  10:04 AM

## 2021-11-22 NOTE — PROGRESS NOTES
Instructed on Lexiscan Stress Test Procedure including possible side effects/ adverse reactions. Patient verbalizes  understanding and denies having any questions . See [address] [place] [address] Cardiology.

## 2021-12-09 NOTE — TELEPHONE ENCOUNTER
Pt states he is out of elequis not sure if he is not sure if he is to continue taking it. If he is he needs an alternative because it is too expensive. Please call to advise.

## 2021-12-09 NOTE — TELEPHONE ENCOUNTER
He needs to continue this medication please provide him with samples and see if he qualifies for patient assistance

## 2021-12-09 NOTE — TELEPHONE ENCOUNTER
I spoke with pt and relayed message per [facility]. I provided 4 boxes of the 2.5 Eliqius and told the daughter that he will need to take 2 tabs BID. Sam leave a note for [employer] to start process for financial assistance. Daughter will  samples. She was told that we will need proof of income to process. She verbalized understanding.

## 2021-12-10 PROBLEM — J18.9 SEPSIS DUE TO PNEUMONIA (HCC): Status: ACTIVE | Noted: 2021-01-01

## 2021-12-10 PROBLEM — A41.9 SEPSIS DUE TO PNEUMONIA (HCC): Status: ACTIVE | Noted: 2021-01-01

## 2021-12-10 NOTE — ED PROVIDER NOTES
[facility]        Pt Name: Nader Reyes  MRN: [id number]  [place] 1950  Date of evaluation: 12/10/2021  Provider: Margarita Herring MD  PCP: Donita Salcido MD    This patient was seen and evaluated by the attending physician Margarita Herring MD.      [address] [place] [address]       Chief Complaint   Patient presents with    Shortness of Breath     pt in from home via [place] [facility] reporting tachycardia and hypoxia, 's hypoxic in the 80's       HISTORY OF PRESENT ILLNESS   (Location/Symptom, Timing/Onset, Context/Setting, Quality, Duration, Modifying Factors, Severity)  Note limiting factors. Nader Reyes is a 73 y.o. male here today with a chief complaint of diarrhea fatigue and weakness at home. Called EMS because he was too weak to get up and move pounds. Known underlying metastatic malignancy with recent permacath placement as well as prior history of A. fib on anticoagulation with Eliquis at baseline maintained on no beta-blockade or calcium channel blockade that I can ascertain. By EMS and A. fib with RVR with a rate of 150. Underlying stage IV renal cell carcinoma for localized metastatic disease. Hemoglobin 7.7 baseline    Nursing Notes were all reviewed and agreed with or any disagreements were addressed  in the HPI. REVIEW OF SYSTEMS    (2-9 systems for level 4, 10 or more for level 5)     Review of Systems    Positives and Pertinent negatives as per HPI. Except as noted abovein the ROS, all other systems were reviewed and negative.        PAST MEDICAL HISTORY     Past Medical History:   Diagnosis Date    Arthritis     Cancer ([address]     De Quervain's tenosynovitis     Diverticulitis     Gout     HTN (hypertension) 9/11/2021         SURGICAL HISTORY     Past Surgical History:   Procedure Laterality Date    CT BIOPSY PERCUTANEOUS DEEP BONE  9/13/2021    CT BIOPSY PERCUTANEOUS DEEP BONE [facility]    HERNIA REPAIR      bilateral inguinal    HIP SURGERY      right hip replacement    HIP SURGERY  3/22/11    REMOVAL OF HARDWARE, RIGHT HIP REVISION, CEMENTED    LIVER SURGERY      abcess    PORT SURGERY Left 11/8/2021    PORT A CATHETER PLACEMENT LEFT SUBCLAVIAN performed by Aisha Fernandez MD at [facility]       Previous Medications    APIXABAN (ELIQUIS) 5 MG TABS TABLET    Take 1 tablet by mouth 2 times daily    ASPIRIN 81 MG EC TABLET    Take 1 tablet by mouth daily    ATORVASTATIN (LIPITOR) 40 MG TABLET    Take 1 tablet by mouth nightly    DEXAMETHASONE (DECADRON) 2 MG TABLET    Take 1 tablet by mouth daily Patient states not taking    ISOSORBIDE MONONITRATE (IMDUR) 30 MG EXTENDED RELEASE TABLET    Take 30 mg by mouth daily    MEGESTROL (MEGACE) 40 MG TABLET    Take 40 mg by mouth daily    MORPHINE (MS CONTIN) 15 MG EXTENDED RELEASE TABLET    Take 15 mg by mouth every 12 hours. MULTIPLE VITAMIN PO    Take 1 tablet by mouth daily. OXYCODONE 5 MG CAPSULE    Take 5 mg by mouth every 4 hours as needed for Pain. ALLERGIES     Patient has no known allergies.     FAMILYHISTORY       Family History   Problem Relation Age of Onset    Cancer Mother         lung    Heart Disease Maternal Grandmother           SOCIAL HISTORY       Social History     Socioeconomic History    Marital status:      Spouse name: None    Number of children: None    Years of education: None    Highest education level: None   Occupational History    Occupation:    Tobacco Use    Smoking status: Former Smoker     Years: 20.00     Types: Cigars    Smokeless tobacco: Never Used    Tobacco comment: quit [id number]   Substance and Sexual Activity    Alcohol use: No     Comment: former    Drug use: No    Sexual activity: None   Other Topics Concern    None   Social History Narrative    None     Social Determinants of Health     Financial Resource Strain:     Difficulty of Paying Living Expenses: Not on file   Food Insecurity:     Worried About Running Out of Food in the Last Year: Not on file    Stan of Food in the Last Year: Not on file   Transportation Needs:     Lack of Transportation (Medical): Not on file    Lack of Transportation (Non-Medical): Not on file   Physical Activity:     Days of Exercise per Week: Not on file    Minutes of Exercise per Session: Not on file   Stress:     Feeling of Stress : Not on file   Social Connections:     Frequency of Communication with Friends and Family: Not on file    Frequency of Social Gatherings with Friends and Family: Not on file    Attends Rastafarian Services: Not on file    Active Member of [address] [place] [address] [employer] or Organizations: Not on file    Attends Club or Organization Meetings: Not on file    Marital Status: Not on file   Intimate Partner Violence:     Fear of Current or Ex-Partner: Not on file    Emotionally Abused: Not on file    Physically Abused: Not on file    Sexually Abused: Not on file   Housing Stability:     Unable to Pay for Housing in the Last Year: Not on file    Number of [place] in the Last Year: Not on file    Unstable Housing in the Last Year: Not on file       SCREENINGS             PHYSICAL EXAM    (up to 7 for level 4, 8 or more for level 5)     ED Triage Vitals   BP Temp Temp src Pulse Resp SpO2 Height Weight   -- -- -- -- -- -- -- --       Physical Exam     General Appearance:  Alert, cooperative, no distress, appears stated age. Head:  Normocephalic, without obvious abnormality, atraumatic. Eyes:  conjunctiva/corneas clear, EOM's intact. Sclera anicteric. ENT: Mucous membranes moist.   Neck: Supple, symmetrical, trachea midline, no adenopathy. No jugular venous distention. Lungs:   No Respiratory Distress. Chest Wall:  Atraumatic. [place] wall   Heart:  Rapid, irreg irred   Abdomen:   Soft, NT, ND   Extremities:  Full range of motion.    Pulses: Symmetric x4   Skin:  No rashes or lesions to exposed skin. Neurologic: Alert and oriented X 3. Motor grossly normal.  Speech clear.           DIAGNOSTIC RESULTS   LABS:    Labs Reviewed   CBC WITH AUTO DIFFERENTIAL - Abnormal; Notable for the following components:       Result Value    WBC 2.6 (*)     Hemoglobin 10.3 (*)     Hematocrit 32.5 (*)     MCV 74.5 (*)     MCH 23.6 (*)     RDW 19.7 (*)     Lymphocytes Absolute 0.3 (*)     All other components within normal limits    Narrative:     Performed at:  [facility] [address], [facility], [address]   Phone [phone]   COMPREHENSIVE METABOLIC PANEL W/ REFLEX TO MG FOR LOW K - Abnormal; Notable for the following components:    Sodium 133 (*)     CO2 14 (*)     Anion Gap 19 (*)     Glucose 140 (*)     BUN 27 (*)     GFR Non- 55 (*)     Albumin 3.3 (*)     Albumin/Globulin Ratio 0.8 (*)     ALT 8 (*)     AST 7 (*)     All other components within normal limits    Narrative:     Vickey Sutherland  [facility] [employer] tel. [phone],  Chemistry results called to and read back by UMA Wagner, 12/10/2021  19:59, by [facility]  Performed at:  [facility] [address], [facility], [address]   Phone [phone]   LIPASE - Abnormal; Notable for the following components:    Lipase 10.0 (*)     All other components within normal limits    Narrative:     Vickey Sutherland  [facility] [employer] tel. [phone],  Chemistry results called to and read back by UMA Wagner, 12/10/2021  19:59, by [facility]  Performed at:  [facility] [address], [facility], [address]   Phone [phone]   TROPONIN - Abnormal; Notable for the following components:    Troponin 0.02 (*)     All other components within normal limits    Narrative:     Vickey Sutherland  [facility] [employer] tel. [phone],  Chemistry results called to and read back by UMA Wagner, 12/10/2021  19:59, by [facility]  Performed at:  [facility]  [address] [facility], [facility] [address]   Phone [phone]   BRAIN NATRIURETIC PEPTIDE - Abnormal; Notable for the following components:    Pro-BNP 10,360 (*)     All other components within normal limits    Narrative:     Socrates Thompson  SFER[facility] tel. [phone],  Chemistry results called to and read back by UMA Hughes, 12/10/2021  19:59, by [facility]  Performed at:  [facility] [address], [place], [facility] [employer]   Phone [phone]   PROTIME-INR - Abnormal; Notable for the following components:    Protime 20.9 (*)     INR 1.80 (*)     All other components within normal limits    Narrative:     Performed at:  [facility] [address], [place], [facility] [employer]   Phone [phone]   C-REACTIVE PROTEIN - Abnormal; Notable for the following components:    .6 (*)     All other components within normal limits    Narrative:     Socrates Thompson  [facility] tel. [phone],  Chemistry results called to and read back by UMA Hughes, 12/10/2021  19:59, by [facility]  Performed at:  [facility] [address], [place], [facility] [employer]   Phone [phone]   LACTATE, SEPSIS - Abnormal; Notable for the following components:    Lactic Acid, Sepsis 4.2 (*)     All other components within normal limits    Narrative:     Socrates Thompson  SF[facility] tel. [phone],  Chemistry results called to and read back by Deborah Renae, 12/10/2021  19:51, by [facility]  Performed at:  [facility] [address], [place], [facility] [employer]   Phone [phone]   BLOOD GAS, VENOUS - Abnormal; Notable for the following components:    pH, Carmelo 7.301 (*)     pCO2, Carmelo 29.0 (*)     pO2, Carmelo 47.6 (*)     HCO3, Venous 14.3 (*)     Base Excess, Carmelo -10.9 (*)     Carboxyhemoglobin 4.2 (*)     All other components within normal limits    Narrative:     Performed at:  [facility] Vitals:    12/10/21 1915 12/10/21 2000 12/10/21 2003   BP: 90/65 104/60    Pulse: 152 135    Resp: (!) 33     Temp:   97.7 °F (36.5 °C)   TempSrc:   Oral   SpO2: 98% 97%    Weight: 184 lb (83.5 kg)         Patient was given thefollowing medications:  Medications   piperacillin-tazobactam (ZOSYN) 3,375 mg in dextrose 5 % 50 mL IVPB (mini-bag) (has no administration in time range)   levoFLOXacin (LEVAQUIN) 500 MG/100ML infusion 500 mg (has no administration in time range)   lactated ringers infusion 2,505 mL (2,505 mLs IntraVENous New Bag 12/10/21 1938)   dilTIAZem injection 10 mg (10 mg IntraVENous Given 12/10/21 1937)       68-year-old male here today in A. fib with RVR secondary to hypovolemia, from Ongoing diarrhea, with associated hypoxia. As to his A. fib will get some rate control fluid bolus and Cardizem. Diarrhea obtain CT to rule out colitis infectious diarrhea. Continue hydration. As to his malignancy requires recent chemotherapeutics. He is not sure, but records seem to have him on some chemotherapy and radiation. Noted to have hyponatremia in the past we will check electrolytes. I reviewed his work-up. Appears to have pneumonia and sepsis leading to A. fib with RVR. Received fluid boluses. Antibiotics. We will admit to hospitalist service. Clinical examination is improved. His repeat lactate is pending. 45 minutes of critical care time. Indication invention as above. No procedures. FINAL IMPRESSION      1. Pneumonia of left lower lobe due to infectious organism    2. Septicemia ([address]    3. Paroxysmal atrial fibrillation ([facility]    4. Atrial fibrillation with rapid ventricular response (HCC)          DISPOSITION/PLAN   DISPOSITION        PATIENT REFERREDTO:  No follow-up provider specified.     DISCHARGE MEDICATIONS:  New Prescriptions    No medications on file       DISCONTINUED MEDICATIONS:  Discontinued Medications    No medications on file              (Please note that portions ofthis note were completed with a voice recognition program.  Efforts were made to edit the dictations but occasionally words are mis-transcribed.)    Hilaria Diaz MD (electronically signed)      SEP-1 CORE MEASURE DATA      Sepsis Criteria   Severe Sepsis Criteria   Septic Shock Criteria     Must be confirmed or suspected to move forward with diagnosis of sepsis. Must meet 2:    [] Temperature > 100.9 F (38.3 C)        or < 96.8 F (36 C)  [x] HR > 90  [] RR > 20  [x] WBC > 12 or < 4 or 10% bands    AND:    [x] Infection Confirmed or        Suspected. OR:    [] Exclude from SEP-1 because:    [] No infection present or suspected  [] Does not have 2+ SIRS criteria but may have an incidental infection that requires treatment  [] May have sepsis, but does not meet criteria for severe sepsis or septic shock  [] Alternative explanation for abnormal labs and/or vitals (see MDM)  [] Viral etiology found or highly suspected (including COVID-19) without concomitant bacterial infection   Must meet 1:    [x] Lactate > 2       or   [x] Signs of Organ Dysfunction:    - SBP < 90 or MAP < 65  - Altered mental status  - Creatinine > 2 or increased from      baseline  - Urine Output < 0.5 ml/kg/hr  - Bilirubin > 2  - INR > 1.5 (not anticoagulated)  - Platelets < 461,528  - Acute Respiratory Failure as     evidenced by new need for NIPPV     or mechanical ventilation        [] No criteria met for Severe Sepsis. Must meet 1:    [x] Lactate > 4        or   [] SBP < 90 or MAP < 65 for at        least two readings in the first        hour after fluid bolus        administration      [] Vasopressors initiated (if hypotension persists after fluid resuscitation)                [] No criteria met for Septic Shock.    Patient Vitals for the past 6 hrs:   BP Temp Pulse Resp SpO2 Weight Weight Method Percent Weight Change   12/10/21 1915 90/65 -- 152 (!) 33 98 % 184 lb (83.5 kg) -- 0   12/10/21 2000 104/60 -- 135 -- 97 % -- -- --   12/10/21 2003 -- 97.7 °F (36.5 °C) -- -- -- -- Actual --      Recent Labs     12/10/21  1917   WBC 2.6*   CREATININE 1.3   BILITOT 0.6   INR 1.80*            Sepsis Time Identified: 1953    Fluid Resuscitation Rational: at least 30mL/kg based on entered actual weight at time of triage    Infection Source: Unknown    Repeat lactate level: pending    Reassessment Exam:   I have reassessed tissue perfusion and hemodynamic status after fluid bolus       Kate Paul MD  12/10/21 2032

## 2021-12-10 NOTE — TELEPHONE ENCOUNTER
Daughter stopped by to  samples today . Spoke with her about the financial assistance paperwork and highlighted what is needed . She is aware and will get completed then return to office.  Physician portion is at my desk in Patient Assistance Folder

## 2021-12-11 NOTE — CONSULTS
[address]   Electrophysiology Consultation   Date: 12/11/2021  Reason for Consultation: Atrial fibrillation    Consult Requesting Physician: Gita Bonilla MD   Chief Complaint   Patient presents with    Shortness of Breath     pt in from home via [place] [facility] reporting tachycardia and hypoxia, 's hypoxic in the 80's       CC: Atrial fibrillation    HPI: Tiera Hancock is a 73 y.o. male history of stage IVrenal cancer, HTN, PAF on anticoagulation who has presented to the hospital with acute respiratory failure, hypoxemia, lactic acidosis and CXR concerning for pneumonia. Further evaluation also found enlargement of left renal mass, compression fracture of L4 and L5, lytic lesions from underlying metastatic disease and liver lesions. Patient was found to be tachycardic and cardiology has been consulted. Patient was diagnosed with atrial fibrillation in 11/2021. His rate was controlled. He was seen by EP and rate control and anticoagulation recommended. Patient has been treated with IV amiodarone on admission. Assessment and plan:   - Atrial fibrillation with RVR   Tachycardia is related to severe underlying medical condition with hypoxemia, pneumonia, acidosis, etc    Patient admitted with pneumonia, hypoxia, lactic acidosis, and diarrhea     He is not a candidate for any EP/cardiac intervetion due to advanced metastatic cancer and multiple co morbidities. Continue with anticoagulation if no contraindication. Amiodarone can be used short term if needed during hospitalization. Rate control/anticoagulation and treatment of underlying medical condition is main therapy      No further EP/cardiac testing recommended. - Pneumonia, sepsis, lactic acidosis:    On antibiotic therapy. - Stage IV metastatic renal cancer   Hospitalist team has consulted palliative care team.     No further EP recommendation. Will sign off. Discussed with nursing staff.      Active Hospital Problems    Diagnosis Date Noted    Sepsis due to pneumonia ([facility] [J18.9, A41.9] 12/10/2021       Diagnostic studies:     ECG: atrial fibrillation     CXR:   Mild cardiomegaly and mild central pulmonary congestion or pulmonary artery   hypertension which is more prominent.       Hazy left basilar atelectasis or infiltrate which is more prominent.       No change in left Port-A-Cath. I independently reviewed the cardiac diagnostic studies, ECG and relevant imaging studies. Lab Results   Component Value Date    LVEF 60 2021     No results found for: TSHFT4, TSH    Physical Examination:  Vitals:    21 1300   BP: (!) 83/58   Pulse: 89   Resp: 26   Temp:    SpO2: 100%      No intake/output data recorded. Wt Readings from Last 3 Encounters:   21 167 lb (75.8 kg)   21 184 lb (83.5 kg)   21 164 lb 11.2 oz (74.7 kg)     Temp  Av °F (36.7 °C)  Min: 97.1 °F (36.2 °C)  Max: 98.6 °F (37 °C)  Pulse  Av.2  Min: 0  Max: 152  BP  Min: 75/51  Max: 113/62  SpO2  Av.5 %  Min: 67 %  Max: 100 %  No intake or output data in the 24 hours ending 21 1326      I independently reviewed all cardiac tracing from cardiac telemetry. · Constitutional: Oriented. Moderate distress    · Head: Normocephalic and atraumatic. · Mouth/Throat: Oropharynx is dry   · Eyes: Conjunctivae normal. EOM are normal.   · Neck: Neck supple. No JVD present. · Cardiovascular: Normal rate, regular rhythm, S1&S2. · Pulmonary/Chest: Bilateral coarse BS   · Abdominal: distended   · Musculoskeletal: No tenderness. · Lymphadenopathy: Has no cervical adenopathy. · Neurological: Alert and oriented.  Follows command,   · Skin: Skin is warm,   · Psychiatric: Has a normal behavior       Scheduled Meds:   apixaban  5 mg Oral BID    atorvastatin  40 mg Oral Nightly    megestrol  40 mg Oral Daily    morphine  15 mg Oral Q12H    sodium chloride flush  5-40 mL IntraVENous 2 times per day    hydrocortisone sodium succinate PF  50 mg IntraVENous Q6H    albuterol sulfate HFA  2 puff Inhalation 4x daily    ipratropium  2 puff Inhalation 4x daily    piperacillin-tazobactam  3,375 mg IntraVENous Q12H    linezolid  600 mg IntraVENous Q12H     Continuous Infusions:   sodium chloride      amiodarone 0.5 mg/min (12/11/21 1239)    sodium bicarbonate infusion 150 mL/hr at 12/11/21 0435     PRN Meds:.oxyCODONE, sodium chloride flush, sodium chloride, acetaminophen **OR** acetaminophen, LORazepam, albuterol sulfate HFA, loperamide     Review of System:  [x] Full ROS obtained and negative except as mentioned in HPI    Prior to Admission medications    Medication Sig Start Date End Date Taking? Authorizing Provider   apixaban (ELIQUIS) 5 MG TABS tablet Take 1 tablet by mouth 2 times daily 11/9/21   Nadege Alvarez MD   oxyCODONE 5 MG capsule Take 5 mg by mouth every 4 hours as needed for Pain. Historical Provider, MD   megestrol (MEGACE) 40 MG tablet Take 40 mg by mouth daily    Historical Provider, MD   dexamethasone (DECADRON) 2 MG tablet Take 1 tablet by mouth daily Patient states not taking 9/23/21   Historical Provider, MD   morphine (MS CONTIN) 15 MG extended release tablet Take 15 mg by mouth every 12 hours. 9/23/21   Historical Provider, MD   atorvastatin (LIPITOR) 40 MG tablet Take 1 tablet by mouth nightly 9/16/21   Verónica Burns MD   aspirin 81 MG EC tablet Take 1 tablet by mouth daily 9/16/21   Verónica Burns MD   isosorbide mononitrate (IMDUR) 30 MG extended release tablet Take 30 mg by mouth daily    Historical Provider, MD   MULTIPLE VITAMIN PO Take 1 tablet by mouth daily.  3/18/11   Historical Provider, MD       Past Medical History:   Diagnosis Date    Arthritis     Cancer ([address]     De Quervain's tenosynovitis     Diverticulitis     Gout     HTN (hypertension) 9/11/2021        Past Surgical History:   Procedure Laterality Date    CT BIOPSY PERCUTANEOUS DEEP BONE  9/13/2021    CT BIOPSY PERCUTANEOUS DEEP BONE 9/13/2021 Patrica Aguayo MD [facility] CT SCAN    HERNIA REPAIR      bilateral inguinal    HIP SURGERY      right hip replacement    HIP SURGERY  3/22/11    REMOVAL OF HARDWARE, RIGHT HIP REVISION, CEMENTED    LIVER SURGERY      abcess    PORT SURGERY Left 11/8/2021    PORT A CATHETER PLACEMENT LEFT SUBCLAVIAN performed by Freida Farley MD at [address]       No Known Allergies    Social History:  Reviewed. reports that he has quit smoking. His smoking use included cigars. He quit after 20.00 years of use. He has never used smokeless tobacco. He reports that he does not drink alcohol and does not use drugs. Family History:  Reviewed. Reviewed. No family history of SCD. Relevant and available labs, and cardiovascular diagnostics reviewed. Reviewed. Recent Labs     12/10/21  1917 12/11/21  0442   * 130*   K 4.2 3.5    98*   CO2 14* 14*   BUN 27* 30*   CREATININE 1.3 1.8*     Recent Labs     12/10/21  1917 12/11/21  0442   WBC 2.6* 4.5   HGB 10.3* 8.5*   HCT 32.5* 26.5*   MCV 74.5* 73.5*    372     Estimated Creatinine Clearance: 42 mL/min (A) (based on SCr of 1.8 mg/dL (H)). No results found for: BNP    I independently reviewed all cardiac tracing from cardiac telemetry. I independently reviewed relevant and available cardiac diagnostic tests ECG, CXR, Echo, Stress test, Device interrogation, Holter, CT scan. Complex medical condition with multiple medical problems affecting prognosis and outcome of EP interventions    All questions and concerns were addressed to the patient/family. Alternatives to my treatment were discussed. I have discussed the above stated plan and the patient verbalized understanding and agreed with the plan. NOTE: This report was transcribed using voice recognition software. Every effort was made to ensure accuracy, however, inadvertent computerized transcription errors may be present.      Viva Osgood, MD, [address]   Office: [phone]  Fax: [phone]

## 2021-12-11 NOTE — ED NOTES
Spoke with and updated pt daughter at this time.  Sherlyn would like to be updated if anything changes [id number] [facility], [address] [place] [address]  12/11/21 7459

## 2021-12-11 NOTE — PROGRESS NOTES
Reviewed finding from CT-abdomen/pelvis which reveals interval enlargement of left renal mass, pathology compression fractures of L4 and L5 vertebral bodies, possible pathologic fracture of L1 vertebral body, lytic lesions from underlying metastatic disease, liver lesions, postobstructive pneumonia and stable left adrenal mass. Oncology has been consulted. Will also initiate consult to palliative care. If deemed necessary, consider consult to neurosurgery for evaluation of compression fractures in the morning. Patient's overall prognosis is guarded. I think he may be better served with palliative care.

## 2021-12-11 NOTE — PROGRESS NOTES
12/11/21 0324   RT Protocol   History Pulmonary Disease 1   Respiratory pattern 6   Breath sounds 4   Cough 0   Indications for Bronchodilator Therapy Wheezing associated with pulm disorder;  Unable to speak in sentences   Bronchodilator Assessment Score 11

## 2021-12-11 NOTE — CONSULTS
[facility]   Progress Note      SUBJECTIVE:     Patient well-known to me from the office. He has stage IV kidney cancer with metastasis to the bones and mediastinal lymph nodes diagnosed in 2021.  -He has been on systemic immunotherapy consisting of ipilimumab and nivolumab and has received 3 cycles so far. C3 was done on 2021. Received Zometa  -He also has received palliative radiation to L3 spine and left pelvis from 2021 to 10/13/2021. Now he is hospitalized with shortness of breath and tachycardia. CT chest has shown left lower lobe airspace disease. He has been started on Zosyn and Zyvox. CT scans have shown significant progression of the disease with increase in the size of left renal mass, increase in the size of left hilar/mediastinal lymphadenopathy. No fever chills. His appetite has not been great  He has chronic low back pain. OBJECTIVE    Physical  VITALS:  BP (!) 94/58   Pulse 86   Temp 97.1 °F (36.2 °C) (Tympanic)   Resp 25   Wt 167 lb (75.8 kg)   SpO2 100%   BMI 21.44 kg/m²   TEMPERATURE:  Current - Temp: 97.1 °F (36.2 °C); Max - Temp  Av °F (36.7 °C)  Min: 97.1 °F (36.2 °C)  Max: 98.6 °F (37 °C)  BLOOD PRESSURE RANGE:  Systolic (92UYF), EUU:92 , Min:75 , ZUQ:121   ; Diastolic (86WIY), HW, Min:40, Max:87    24HR INTAKE/OUTPUT:  No intake or output data in the 24 hours ending 21 1554    Patient is drowsy but oriented. No mucositis  Neck is supple  Lungs basal crackles were heard. Abdomen is soft bowel sounds are heard. Extremities no edema.     Data  Labs:  General Labs:  CBC with Differential:    Lab Results   Component Value Date    WBC 4.5 2021    RBC 3.61 2021    HGB 8.5 2021    HCT 26.5 2021     2021    MCV 73.5 2021    MCH 23.6 2021    MCHC 32.2 2021    RDW 18.8 2021    SEGSPCT 77.8 2011    LYMPHOPCT 10.3 2021    MONOPCT 5.9 2021    EOSPCT 0.7 03/24/2011    BASOPCT 0.1 12/11/2021    MONOSABS 0.3 12/11/2021    LYMPHSABS 0.5 12/11/2021    EOSABS 0.1 12/11/2021    BASOSABS 0.0 12/11/2021    DIFFTYPE Auto 03/24/2011     BMP:    Lab Results   Component Value Date     12/11/2021    K 3.5 12/11/2021    CL 98 12/11/2021    CO2 14 12/11/2021    BUN 30 12/11/2021    LABALBU 3.3 12/10/2021    CREATININE 1.8 12/11/2021    CALCIUM 7.2 12/11/2021    GFRAA 45 12/11/2021    GFRAA >60 03/24/2011    LABGLOM 38 12/11/2021    GLUCOSE 233 12/11/2021     Hepatic Function Panel:    Lab Results   Component Value Date    ALKPHOS 69 12/10/2021    ALT 8 12/10/2021    AST 7 12/10/2021    PROT 7.7 12/10/2021    PROT 5.8 03/24/2011    BILITOT 0.6 12/10/2021    BILIDIR <0.2 10/07/2021    IBILI see below 10/07/2021    LABALBU 3.3 12/10/2021     LDH:    Lab Results   Component Value Date     09/12/2021     PT/INR:    Lab Results   Component Value Date    PROTIME 20.9 12/10/2021    INR 1.80 12/10/2021     PTT:    Lab Results   Component Value Date    APTT 36.5 12/10/2021   [APTT    Current Medications  Current Facility-Administered Medications: apixaban (ELIQUIS) tablet 5 mg, 5 mg, Oral, BID  atorvastatin (LIPITOR) tablet 40 mg, 40 mg, Oral, Nightly  megestrol (MEGACE) tablet 40 mg, 40 mg, Oral, Daily  oxyCODONE (ROXICODONE) immediate release tablet 5 mg, 5 mg, Oral, Q4H PRN  sodium chloride flush 0.9 % injection 5-40 mL, 5-40 mL, IntraVENous, 2 times per day  sodium chloride flush 0.9 % injection 5-40 mL, 5-40 mL, IntraVENous, PRN  0.9 % sodium chloride infusion, 25 mL, IntraVENous, PRN  acetaminophen (TYLENOL) tablet 650 mg, 650 mg, Oral, Q6H PRN **OR** acetaminophen (TYLENOL) suppository 650 mg, 650 mg, Rectal, Q6H PRN  hydrocortisone sodium succinate PF (SOLU-CORTEF) injection 50 mg, 50 mg, IntraVENous, Q6H  LORazepam (ATIVAN) tablet 0.5 mg, 0.5 mg, Oral, Q4H PRN  albuterol sulfate  (90 Base) MCG/ACT inhaler 2 puff, 2 puff, Inhalation, Q4H PRN  albuterol sulfate  (90 Base) MCG/ACT inhaler 2 puff, 2 puff, Inhalation, 4x daily  ipratropium (ATROVENT HFA) 17 MCG/ACT inhaler 2 puff, 2 puff, Inhalation, 4x daily  piperacillin-tazobactam (ZOSYN) 3,375 mg in dextrose 5 % 50 mL IVPB extended infusion (mini-bag), 3,375 mg, IntraVENous, Q12H  loperamide (IMODIUM) 1 MG/7.5ML solution 4 mg, 4 mg, Oral, 4x Daily PRN  magnesium sulfate 2000 mg in 50 mL IVPB premix, 2,000 mg, IntraVENous, Once  morphine (MS CONTIN) extended release tablet 15 mg, 15 mg, Oral, 2 times per day  potassium chloride (KLOR-CON M) extended release tablet 40 mEq, 40 mEq, Oral, Once  linezolid (ZYVOX) IVPB 600 mg, 600 mg, IntraVENous, Q12H  [COMPLETED] amiodarone (CORDARONE) 150 mg in dextrose 5 % 100 mL bolus, 150 mg, IntraVENous, Once **FOLLOWED BY** [] amiodarone (CORDARONE) 450 mg in dextrose 5 % 250 mL infusion, 1 mg/min, IntraVENous, Continuous **FOLLOWED BY** amiodarone (CORDARONE) 450 mg in dextrose 5 % 250 mL infusion, 0.5 mg/min, IntraVENous, Continuous  sodium bicarbonate 100 mEq in dextrose 5 % 1,000 mL infusion, , IntraVENous, Continuous    ASSESSMENT AND PLAN    72-year-old gentleman has    1. Metastatic kidney cancer to the bones and mediastinal lymph nodes:    -Diagnosed in 2021  -See above for treatments received. -Based on the imaging studies performed on 12/10/2021, he appears to have progressive disease.  -I will be talking to the patient and family regarding further steps of management-change of therapy to second line treatment versus best supportive care    2. Respiratory failure, hypoxia:    -He is on antibiotics for pneumonia  -We will request pulmonary consult-if lower lobe abnormality represents pneumonia versus pneumonitis. -If family wants to pursue treatment, I would prefer getting under the biopsy. 3.  Anemia:    -Anemia of chronic disease  -Transfuse PRBCs if hemoglobin is less than 7    4. Neoplasm related pain:    -MS Contin and oxycodone    5. A. fib with RVR:    -Evaluated by cardiology  -Started anticoagulation and amiodarone    -D/W Dr. Peetr Villegas MD

## 2021-12-11 NOTE — CONSULTS
Nephrology Consult Note  [phone]   [facility] [facility]           Reason for Consult:  Acute Kidney injury       HISTORY OF PRESENT ILLNESS:    Mr Stephanie Mcginnis is a 74 yo male with h/o renal cell carcinoma metastatic diagnosed in 9/2021, HTN, Gout, Atrial fibrillation on AC, Osteoarthritis came in to ER with generalized weakness, shortness of breath and diarrhea. He was found to be hypoxic and tachycardic with HRs upto 150s. Subsequently also became hypotensive to systolics of 45T. Labs revealed SYL with a BUN/Cr of 33/6.7 and metabolic acidosis/lactic acidosis. CXR on admission showed mild cardiomegaly and mild central pulm congestion. A CT of the chest with contrast ruled out PE, but revealed a 5.1 cm infrahilar mass left which may represent enlarged conglomerate of lymph nodes, multiple enlarged right hilar lymph nodes, small left sided pleural effusion, moderate sized consolidation in the LLL concerning for an ASD. He is being treated for a possible pneumonia. In regards to his renal cell cancer, he was diagnosed in 9/2019, metastatic to bones and mediastinal LNs, on systemic immunotherapy consisting of iplimumab and nivolumab and has received 3 cycles so far. C3 done on 12/2/2021. Also is s/p palliative XRT to L3 spine and left pelvis from 9/29/2021 to 10/13/2021. He is currently being treated with broad-spectrum Abx for a possible pneumonia. We are consulted for SYL  Baseline creatinine is less than 1  Creatinine on admission was 1.3 and is 1.8 at this time  SYL was presumed to be secondary to volume depletion and has been on isotonic fluids.    Pt seen and examined  Family at the bedside  Denies NSAID usage  Denies urinary issues  C/o sob  No chest pains      Past Medical History:        Diagnosis Date    Arthritis     Cancer ([address]     De Quervain's tenosynovitis     Diverticulitis     Gout     HTN (hypertension) 9/11/2021       Past Surgical History:        Procedure Laterality Date  CT BIOPSY PERCUTANEOUS DEEP BONE  9/13/2021    CT BIOPSY PERCUTANEOUS DEEP BONE 9/13/2021 Yuni Carrasco MD [facility] CT SCAN    HERNIA REPAIR      bilateral inguinal    HIP SURGERY      right hip replacement    HIP SURGERY  3/22/11    REMOVAL OF HARDWARE, RIGHT HIP REVISION, CEMENTED    LIVER SURGERY      abcess    PORT SURGERY Left 11/8/2021    PORT A CATHETER PLACEMENT LEFT SUBCLAVIAN performed by Chapito Duarte MD at [address] [place] [address]         Current Medications:    No current facility-administered medications on file prior to encounter. Current Outpatient Medications on File Prior to Encounter   Medication Sig Dispense Refill    apixaban (ELIQUIS) 5 MG TABS tablet Take 1 tablet by mouth 2 times daily 180 tablet 1    oxyCODONE 5 MG capsule Take 5 mg by mouth every 4 hours as needed for Pain.  megestrol (MEGACE) 40 MG tablet Take 40 mg by mouth daily      dexamethasone (DECADRON) 2 MG tablet Take 1 tablet by mouth daily Patient states not taking      morphine (MS CONTIN) 15 MG extended release tablet Take 15 mg by mouth every 12 hours.  atorvastatin (LIPITOR) 40 MG tablet Take 1 tablet by mouth nightly 30 tablet 3    aspirin 81 MG EC tablet Take 1 tablet by mouth daily 30 tablet 3    isosorbide mononitrate (IMDUR) 30 MG extended release tablet Take 30 mg by mouth daily      MULTIPLE VITAMIN PO Take 1 tablet by mouth daily. Allergies:  Patient has no known allergies.     Social History:    Social History     Socioeconomic History    Marital status:      Spouse name: Not on file    Number of children: Not on file    Years of education: Not on file    Highest education level: Not on file   Occupational History    Occupation:    Tobacco Use    Smoking status: Former Smoker     Years: 20.00     Types: Cigars    Smokeless tobacco: Never Used    Tobacco comment: quit [id number]   Substance and Sexual Activity    Alcohol use: No     Comment: former    Drug use: No    Sexual activity: Not on file   Other Topics Concern    Not on file   Social History Narrative    Not on file     Social Determinants of Health     Financial Resource Strain:     Difficulty of Paying Living Expenses: Not on file   Food Insecurity:     Worried About Running Out of Food in the Last Year: Not on file    Stan of Food in the Last Year: Not on file   Transportation Needs:     Lack of Transportation (Medical): Not on file    Lack of Transportation (Non-Medical): Not on file   Physical Activity:     Days of Exercise per Week: Not on file    Minutes of Exercise per Session: Not on file   Stress:     Feeling of Stress : Not on file   Social Connections:     Frequency of Communication with Friends and Family: Not on file    Frequency of Social Gatherings with Friends and Family: Not on file    Attends [employer]ology Services: Not on file    Active Member of [address] [place] [address] [employer] or Organizations: Not on file    Attends Club or Organization Meetings: Not on file    Marital Status: Not on file   Intimate Partner Violence:     Fear of Current or Ex-Partner: Not on file    Emotionally Abused: Not on file    Physically Abused: Not on file    Sexually Abused: Not on file   Housing Stability:     Unable to Pay for Housing in the Last Year: Not on file    Number of [place] in the Last Year: Not on file    Unstable Housing in the Last Year: Not on file       Family History:       Problem Relation Age of Onset    Cancer Mother         lung    Heart Disease Maternal Grandmother            Review of Systems   Constitutional: Positive for activity change, appetite change, fatigue and unexpected weight change. Negative for chills and fever. HENT: Negative for ear discharge, ear pain, rhinorrhea, sneezing and sore throat. Eyes: Negative for photophobia, pain and redness. Respiratory: Positive for cough and shortness of breath. Negative for chest tightness.     Cardiovascular: Negative for chest pain, palpitations and leg swelling. Gastrointestinal: Negative for abdominal pain, constipation, diarrhea, nausea and vomiting. Endocrine: Negative for polydipsia, polyphagia and polyuria. Genitourinary: Negative for difficulty urinating, dysuria, hematuria and urgency. Musculoskeletal: Negative for arthralgias, back pain and myalgias. Neurological: Negative for dizziness, tremors, seizures and headaches. Psychiatric/Behavioral: Negative for confusion and hallucinations. PHYSICAL EXAM:      Vitals:    BP (!) 94/58   Pulse 86   Temp 97.1 °F (36.2 °C) (Tympanic)   Resp 25   Wt 167 lb (75.8 kg)   SpO2 100%   BMI 21.44 kg/m²   No intake/output data recorded. No intake/output data recorded. Physical Exam:  General : Awake, alert, oriented x2,  appears frail, ill-looking, not in pain or respiratory distress, resting in bed  HEENT : normocephalic, atraumatic, mucosa dry,  no palor or icterus  CVS: S1 S2 normal, regular rhythm, no murmurs or rubs. Lungs: decreased breath sounds at the bases  Abd: Soft, bowel sounds normal, non-tender. Ext: No edema, no cyanosis  Skin: Warm. No rashes appreciated. : bladder non-distended, no tenderness over the bladder  Neuro: Alert and oriented x 2, nonfocal.  Joints: No erythema noted over joints.       DATA:    CBC with Differential:    Lab Results   Component Value Date    WBC 4.5 12/11/2021    RBC 3.61 12/11/2021    HGB 8.5 12/11/2021    HCT 26.5 12/11/2021     12/11/2021    MCV 73.5 12/11/2021    MCH 23.6 12/11/2021    MCHC 32.2 12/11/2021    RDW 18.8 12/11/2021    SEGSPCT 77.8 03/24/2011    LYMPHOPCT 10.3 12/11/2021    MONOPCT 5.9 12/11/2021    EOSPCT 0.7 03/24/2011    BASOPCT 0.1 12/11/2021    MONOSABS 0.3 12/11/2021    LYMPHSABS 0.5 12/11/2021    EOSABS 0.1 12/11/2021    BASOSABS 0.0 12/11/2021    DIFFTYPE Auto 03/24/2011     BMP:    Lab Results   Component Value Date     12/11/2021    K 3.5 12/11/2021    CL 98 12/11/2021    CO2 14 12/11/2021 patient. I will continue to follow along. Please call with questions.     Vickie Deal MD, MD.  Office Phone : [phone]  12/11/2021

## 2021-12-11 NOTE — H&P
Hospital Medicine History and Physical    12/10/2021    Date of Admission: 12/10/2021    Date of Service: Pt seen/examined on 12/10/2021 and admitted to inpatient. Assessment/plan:  1. Pneumonia. COVID-19 test, antigen studies (strep and Legionella) and blood cultures pending. Start Zyvox and Zosyn. Start breathing treatments. 2. Sepsis secondary to pneumonia. Continue intravenous fluid, antibiotics, steroid and trend lactic acid level. Follow pending culture results. 3. Acute respiratory failure with hypoxia. Secondary to pneumonia. Continue supplemental oxygen with plan to wean as tolerated. Monitor pulse oximeter closely. Check blood gas. 4. Atrial fibrillation with rapid ventricular response. Patient received 10 mg of IV Cardizem in the emergency room. Will start amiodarone infusion. Continue home dose of Eliquis. Consult cardiology to assist with evaluation. 5. Nonzero troponin. Likely secondary to rapid atrial fibrillation/tachycardia, demand ischemia. Already on full dose anticoagulation (for the indication). Trend troponin. Monitor on telemetry. 6. Lactic acidosis. Secondary to underlying infectious process. Management as noted above. 7. Other comorbidities: History of renal cancer, osteoarthritis, essential hypertension. 8. Patient currently has pending CT-chest and CT-abdomen/pelvis with IV contrast.  Will follow result. Activities: Up with assist  Prophylaxis: Eliquis  Code status: Full code    ==========================================================  Chief complaint:  Chief Complaint   Patient presents with    Shortness of Breath     pt in from home via [facility] reporting tachycardia and hypoxia, 's hypoxic in the 80's         History of Presenting Illness:   This is a pleasant 73 y.o. male with history of paroxysmal atrial fibrillation (on chronic anticoagulation with Eliquis), history of renal cancer, osteoarthritis, essential hypertension, who presents to the emergency room with complaints of shortness of breath, recent diarrhea, generalized weakness. Patient was also noted to be hypoxic with oxygen saturation in the 80s. He was tachycardic with heart rate in the 150s. On presentation to the emergency room, lactic acid is elevated, chest x-ray concerning for pneumonia. Past Medical History:      Diagnosis Date    Arthritis     Cancer ([address]     De Quervain's tenosynovitis     Diverticulitis     Gout     HTN (hypertension) 9/11/2021       Past Surgical History:      Procedure Laterality Date    CT BIOPSY PERCUTANEOUS DEEP BONE  9/13/2021    CT BIOPSY PERCUTANEOUS DEEP BONE 9/13/2021 Colin Alcaraz MD [facility] CT SCAN    HERNIA REPAIR      bilateral inguinal    HIP SURGERY      right hip replacement    HIP SURGERY  3/22/11    REMOVAL OF HARDWARE, RIGHT HIP REVISION, CEMENTED    LIVER SURGERY      abcess    PORT SURGERY Left 11/8/2021    PORT A CATHETER PLACEMENT LEFT SUBCLAVIAN performed by Shine Koenig MD at [address] [place] [address]       Medications (prior to admission):  Prior to Admission medications    Medication Sig Start Date End Date Taking? Authorizing Provider   apixaban (ELIQUIS) 5 MG TABS tablet Take 1 tablet by mouth 2 times daily 11/9/21   Charley Leigh MD   oxyCODONE 5 MG capsule Take 5 mg by mouth every 4 hours as needed for Pain. Historical Provider, MD   megestrol (MEGACE) 40 MG tablet Take 40 mg by mouth daily    Historical Provider, MD   dexamethasone (DECADRON) 2 MG tablet Take 1 tablet by mouth daily Patient states not taking 9/23/21   Historical Provider, MD   morphine (MS CONTIN) 15 MG extended release tablet Take 15 mg by mouth every 12 hours.  9/23/21   Historical Provider, MD   atorvastatin (LIPITOR) 40 MG tablet Take 1 tablet by mouth nightly 9/16/21   Grover Ingram MD   aspirin 81 MG EC tablet Take 1 tablet by mouth daily 9/16/21   Grover Ingram MD   isosorbide mononitrate (IMDUR) 30 MG extended release tablet Take 30 mg by mouth daily    Historical Provider, MD   MULTIPLE VITAMIN PO Take 1 tablet by mouth daily. 3/18/11   Historical Provider, MD       Allergy(ies):  Patient has no known allergies. Social History:  TOBACCO:  reports that he has quit smoking. His smoking use included cigars. He quit after 20.00 years of use. He has never used smokeless tobacco.  ETOH:  reports no history of alcohol use. Family History:      Problem Relation Age of Onset    Cancer Mother         lung    Heart Disease Maternal Grandmother        Review of Systems:  Pertinent positives are listed in HPI. At least 10-point ROS reviewed and were negative. Vitals and physical examination:  /60   Pulse 135   Temp 97.7 °F (36.5 °C) (Oral)   Resp (!) 33   Wt 184 lb (83.5 kg)   SpO2 97%   BMI 23.62 kg/m²   Gen/overall appearance: Not in acute distress. Alert. Oriented x3. Head: Normocephalic, atraumatic  Eyes: EOMI, good acuity  ENT: Oral mucosa moist  Neck: No JVD, thyromegaly  CVS: Nml S1S2, no MRG. Tachycardic. Pulm: Clear bilaterally. No crackles/wheezes  Gastrointestinal: Soft, NT/ND, +BS  Musculoskeletal: No edema. Warm  Neuro: No focal deficit. Moves extremity spontaneously. Psychiatry: Appropriate affect. Not agitated. Skin: Warm, dry with normal turgor.  No rash  Capillary refill: Brisk,< 3 seconds   Peripheral Pulses: +2 palpable, equal bilaterally       Labs/imaging/EKG:  CBC:   Recent Labs     12/10/21  1917   WBC 2.6*   HGB 10.3*        BMP:    Recent Labs     12/10/21  1917   *   K 4.2      CO2 14*   BUN 27*   CREATININE 1.3   GLUCOSE 140*     Hepatic:   Recent Labs     12/10/21  1917   AST 7*   ALT 8*   BILITOT 0.6   ALKPHOS 69         XR CHEST PORTABLE    Result Date: 12/10/2021  EXAMINATION: ONE XRAY VIEW OF THE CHEST 12/10/2021 7:38 pm COMPARISON: 11/08/2021 HISTORY: ORDERING SYSTEM PROVIDED HISTORY: Eval for infiltrate TECHNOLOGIST PROVIDED HISTORY: Reason for exam:->Eval for infiltrate Reason for Exam: Eval for infiltrate FINDINGS: The heart is mildly enlarged unchanged. The pulmonary vessels are engorged centrally and more prominent. There is is hazy left basilar opacity which is more prominent. There is a small calcified granuloma right lower lobe which is unchanged. There is a left Port-A-Cath in place which is unchanged. Mild cardiomegaly and mild central pulmonary congestion or pulmonary artery hypertension which is more prominent. Hazy left basilar atelectasis or infiltrate which is more prominent. No change in left Port-A-Cath. EKG: Fibrillation, rate 154 beats per minutes. No acute ST/T changes. No septal Q waves present. QTc 448. I reviewed EKG. Discussed with ER physician.       Thank you Skip Reyes MD for the opportunity to be involved in this patient's care.    -----------------------------  Sai Montiel MD  [facility] hospitalist

## 2021-12-11 NOTE — PROGRESS NOTES
HOSPITALISTS PROGRESS NOTE    12/11/2021 9:00 AM        Name: Kike Orozco . Admitted: 12/10/2021  Primary Care Provider: Maxine Farrar MD (Tel: [phone])      CC: Shortness of breath    Brief Course: This 73 y.o. male with history of paroxysmal atrial fibrillation (on chronic anticoagulation with Eliquis), history of renal cancer, osteoarthritis, essential hypertension, who presents to the emergency room with complaints of shortness of breath, recent diarrhea, generalized weakness. Patient was also noted to be hypoxic with oxygen saturation in the 80s. He was tachycardic with heart rate in the 150s. On presentation to the emergency room, lactic acid is elevated, chest x-ray concerning for pneumonia    Interval history:   Pt seen and examined today   Overnight events noted and interval ancillary notes  Soft pressures this morning: Remains in A. fib  Afebrile overnight, WBCs around 4.5, procalcitonin elevated 9.80  Creatinine trended up to 1.8 nephrology consulted  Wife updated about patient's worsening cancer and critical condition; stated that she will discuss with the family and will let us know about the CODE STATUS and goals of care  Palliative care consulted      Assessment & Plan:     Pneumonia. COVID-19 test, antigen studies (strep and Legionella) and blood cultures pending. CTA chest negative for PE but showed 5.1 cm left infrahilar mass, multiple enlarged right hilar lymph nodes and small left-sided pleural effusion. Moderate size consolidation in left lower lobe  Start Zyvox and Zosyn. Start breathing treatments. Sepsis secondary to pneumonia. Continue intravenous fluid, antibiotics, steroid and trend lactic acid level. Follow pending culture results. Acute respiratory failure with hypoxia. Secondary to pneumonia. Continue supplemental oxygen with plan to wean as tolerated.     Monitor pulse oximeter closely. Check blood gas. Atrial fibrillation with rapid ventricular response. Status post 10 mg of IV Cardizem in the emergency room. Started on amiodarone infusion. Continue home dose of Eliquis. Cardiology on board: Per the recs amiodarone can be used for short-term if needed during hospitalization    Nonzero troponin. Likely secondary to rapid atrial fibrillation/tachycardia, demand ischemia. Already on full dose anticoagulation (for the indication). Trend troponin and on telemetry. Hx of metastatic kidney cancer with mets to bone and mediastinal nodes  Oncology on board: Appreciate recs  Images showed progressive disease  Palliative care consulted for goals of care and code discussion    SYL: Likely prerenal in setting of hypertension   creatinine trended up to 1.8  Nephrology consulted: Appreciate recs  Avoid nephrotoxin  Daily weights and strict intake output  Monitor renal function closely    Lactic acidosis. Secondary to underlying infectious process/cancer  Trend lactate    Other comorbidities: History of renal cancer, osteoarthritis, essential hypertension.       DVT PPX: Eliquis  Code:Full Code  Diet: Diet NPO    Disposition:     Current Medications  apixaban (ELIQUIS) tablet 5 mg, BID  atorvastatin (LIPITOR) tablet 40 mg, Nightly  megestrol (MEGACE) tablet 40 mg, Daily  morphine (MS CONTIN) extended release tablet 15 mg, Q12H  oxyCODONE capsule 5 mg, Q4H PRN  sodium chloride flush 0.9 % injection 5-40 mL, 2 times per day  sodium chloride flush 0.9 % injection 5-40 mL, PRN  0.9 % sodium chloride infusion, PRN  acetaminophen (TYLENOL) tablet 650 mg, Q6H PRN   Or  acetaminophen (TYLENOL) suppository 650 mg, Q6H PRN  hydrocortisone sodium succinate PF (SOLU-CORTEF) injection 50 mg, Q6H  LORazepam (ATIVAN) tablet 0.5 mg, Q4H PRN  albuterol sulfate  (90 Base) MCG/ACT inhaler 2 puff, Q4H PRN  albuterol sulfate  (90 Base) MCG/ACT inhaler 2 puff, 4x daily  ipratropium disease involvement. 2. Pathologic compression fractures of the L4 and L5 vertebral bodies. Suspected pathologic fracture of the L1 vertebral body. Additional lytic   lesions within the visualized osseous structures, compatible with metastatic   disease. 3. Subcentimeter hypoattenuating hepatic lesions, indeterminate. However,   given findings elsewhere, this is concerning for hepatic metastatic disease. 4. Interval progression of partially visualized intrathoracic disease with   postobstructive atelectasis versus pneumonia. 5. Grossly stable left adrenal mass. CT CHEST PULMONARY EMBOLISM W CONTRAST   Final Result   No evidence of pulmonary embolism. There is a 5.1 cm left infrahilar mass which may represent an enlarged   conglomerate of lymph nodes. Multiple enlarged right hilar lymph nodes are   present. Small left-sided pleural effusion. Moderate size area of   consolidation in the left lower lobe, concerning for an airspace disease   process. XR CHEST PORTABLE   Final Result   Mild cardiomegaly and mild central pulmonary congestion or pulmonary artery   hypertension which is more prominent. Hazy left basilar atelectasis or infiltrate which is more prominent. No change in left Port-A-Cath. Problem List  Active Problems:    Sepsis due to pneumonia [facility])  Resolved Problems:    * No resolved hospital problems.  Millie English MD   12/11/2021 9:00 AM

## 2021-12-11 NOTE — ED NOTES
Attending notified that pt hypotensive at 87/63. IVF bolus infusing at this time.       Raza Foy RN  12/11/21 [id number]

## 2021-12-11 NOTE — ED NOTES
Pt cleaned up at this time. Large bowel movement noted of mucus, loose stool. Pt states he has been having these for the past week.  Linen chnaged and placed in a clean [address] , [address] [facility]  12/11/21 [id number]

## 2021-12-11 NOTE — RT PROTOCOL NOTE
RT Inhaler-Nebulizer Bronchodilator Protocol Note    There is a bronchodilator order in the chart from a provider indicating to follow the RT Bronchodilator Protocol and there is an Initiate RT Inhaler-Nebulizer Bronchodilator Protocol order as well (see protocol at bottom of note). CXR Findings:  XR CHEST PORTABLE    Result Date: 12/10/2021  Mild cardiomegaly and mild central pulmonary congestion or pulmonary artery hypertension which is more prominent. Hazy left basilar atelectasis or infiltrate which is more prominent. No change in left Port-A-Cath. The findings from the last RT Protocol Assessment were as follows:   History Pulmonary Disease: Smoker 15 pack years or more  Respiratory Pattern: Use of accessory muscles, prolonged exhalation, or RR 26-30 bpm  Breath Sounds: Intermittent or unilateral wheezes  Cough: Strong, spontaneous, non-productive  Indication for Bronchodilator Therapy: Wheezing associated with pulm disorder, Unable to speak in sentences  Bronchodilator Assessment Score: 11    Aerosolized bronchodilator medication orders have been revised according to the RT Inhaler-Nebulizer Bronchodilator Protocol below. Respiratory Therapist to perform RT Therapy Protocol Assessment initially then follow the protocol. Repeat RT Therapy Protocol Assessment PRN for score 0-3 or on second treatment, BID, and PRN for scores above 3. No Indications - adjust the frequency to every 6 hours PRN wheezing or bronchospasm, if no treatments needed after 48 hours then discontinue using Per Protocol order mode. If indication present, adjust the RT bronchodilator orders based on the Bronchodilator Assessment Score as indicated below.   Use Inhaler orders unless patient has one or more of the following: on home nebulizer, not able to hold breath for 10 seconds, is not alert and oriented, cannot activate and use MDI correctly, or respiratory rate 25 breaths per minute or more, then use the equivalent nebulizer order(s) with same Frequency and PRN reasons based on the score. If a patient is on this medication at home then do not decrease Frequency below that used at home. 0-3 - enter or revise RT bronchodilator order(s) to equivalent RT Bronchodilator order with Frequency of every 4 hours PRN for wheezing or increased work of breathing using Per Protocol order mode. 4-6 - enter or revise RT Bronchodilator order(s) to two equivalent RT bronchodilator orders with one order with BID Frequency and one order with Frequency of every 4 hours PRN wheezing or increased work of breathing using Per Protocol order mode. 7-10 - enter or revise RT Bronchodilator order(s) to two equivalent RT bronchodilator orders with one order with TID Frequency and one order with Frequency of every 4 hours PRN wheezing or increased work of breathing using Per Protocol order mode. 11-13 - enter or revise RT Bronchodilator order(s) to one equivalent RT bronchodilator order with QID Frequency and an Albuterol order with Frequency of every 4 hours PRN wheezing or increased work of breathing using Per Protocol order mode. Greater than 13 - enter or revise RT Bronchodilator order(s) to one equivalent RT bronchodilator order with every 4 hours Frequency and an Albuterol order with Frequency of every 2 hours PRN wheezing or increased work of breathing using Per Protocol order mode. RT to enter RT Home Evaluation for COPD & MDI Assessment order using Per Protocol order mode.     Electronically signed by Johnathan Ramos RCP on 12/11/2021 at 3:25 AM

## 2021-12-11 NOTE — PROGRESS NOTES
Facility/Department: [address]  Initial Assessment  DYSPHAGIA BEDSIDE SWALLOW EVALUATION     Patient: Bony Kulkarni   : 1950   MRN: [id number]      Evaluation Date: 2021   Admitting Diagnosis: Paroxysmal atrial fibrillation (HCC) [I48.0]  Septicemia ([address] [A41.9]  Atrial fibrillation with rapid ventricular response ([address] [I48.91]  Sepsis due to pneumonia ([address] [J18.9, A41.9]  Pneumonia of left lower lobe due to infectious organism [J18.9]  Pain: No reports of pain from pt                         H&P: This is a pleasant 73 y.o. male with history of paroxysmal atrial fibrillation (on chronic anticoagulation with Eliquis), history of renal cancer, osteoarthritis, essential hypertension, who presents to the emergency room with complaints of shortness of breath, recent diarrhea, generalized weakness. Patient was also noted to be hypoxic with oxygen saturation in the 80s. He was tachycardic with heart rate in the 150s. On presentation to the emergency room, lactic acid is elevated, chest x-ray concerning for pneumonia. Chest CT:   No evidence of pulmonary embolism.       There is a 5.1 cm left infrahilar mass which may represent an enlarged   conglomerate of lymph nodes.  Multiple enlarged right hilar lymph nodes are   present. Small left-sided pleural effusion. Moderate size area of   consolidation in the left lower lobe, concerning for an airspace disease   process. MRI Brain:  21  No acute intracranial abnormality.  No mass effect or abnormal intracranial   enhancement to suggest intracranial metastasis.       Minimal chronic microvascular disease.       Minimal parenchymal volume loss.       Moderate bilateral mastoid effusions.      Modified Barium Swallow Study: None reported in the pt's chart    History/Prior Level of Function:   Living Status: lives at home with his wife  Prior Dysphagia History: No prior history of dysphagia    Dysphagia Impressions/Diagnosis: Oropharyngeal Dysphagia Pt alert, laying upright in bed upon SLP's arrival. Pt is currently on 4 liters of O2 via nasal cannula with his O2 levels at 100%. The pt denies difficulty swallowing prior to his hospital admission but reports \"I haven't eaten nothing in a week. \" The pt demonstrates a soft, weak dry cough prior to oral intake. The pt demonstrates reduced lingual coordination during the oral mechanism exam and reduced laryngeal elevation/excursion, determined via palpation. Pt presented with the following trials: ice chips, thin via cup, moist puree, minced & moist and a regular texture. The pt demonstrates extensive manipulation of the ice chips but no overt signs/symptoms of penetration/aspiration. The pt demonstrates reduced labial seal while feeding himself thin liquids via cup with delayed throat clear x1 and dry, weak coughing x1 on 2/3 trials. The pt demonstrated a delayed throat clear on 1/3 trials of the moist puree. The pt took multiple bites at once of the minced & moist trial and had mild oral residue and extensive dry, weak coughing on 3/3 trials. The pt declined the regular textured trial or any additional offered items to trial. Throughout the evaluation, the pt demonstrated increasing overt signs/symptoms of penetration/aspiration with increased trials and increased effort to breathe. He reports no interest in oral intake at this time and requests to be NPO with re-evaluation from [facility] Heather Richardson tomorrow. Recommended Diet and Intervention 12/11/2021:  Diet Solids Recommendation:  NPO Liquid Consistency Recommendation:  NPO Recommended form of Meds:   NPO except for necessary medications     Compensatory Swallowing Strategies:  TBD with ongoing swallowing assessment    SHORT TERM DYSPHAGIA GOALS/PLAN OF CARE: Speech therapy for dysphagia tx 3-5 times per week during acute care stay.     Pt will functionally tolerate ongoing assessment of swallow function with diet to be determined as indicated   Pt will demonstrate understanding of aspiration risk and precautions via education/demonstration with occasional prompting  If clinical s/s of aspiration/penetration continue to be noted, Pt will participate in Modified Barium Swallow Study     Dysphagia Therapeutic Intervention:  Oral Care, Patient/Family Education , Therapeutic Trials with SLP , Instrumental assessment of swallow function (Modified Barium Swallow Study)     Discharge Recommendations: Recommend ongoing SLP for intensive dysphagia therapy upon discharge from hospital     Patient Positioning: Upright in bed    Current Diet Level (prior to evaluation): Regular texture diet with  Thin liquids      Respiratory Status:   []Room Air   [x]O2 via nasal cannula -4 liters   []Other:    Dentition:  []Adequate  [x]Dentures (top)  [x]Missing Many Teeth (bottom)  []Edentulous  []Other:    Baseline Vocal Quality:  []Normal  []Dysphonic   []Aphonic   []Hoarse  []Wet  [x]Weak  []Other:    Volitional Cough:  []Strong  [x]Weak  []Wet  []Absent  []Congested  []Other:    Volitional Swallow:   []Absent   []Delayed     [x]Adequate     [x]Required use of drink     Oral Mechanism Exam:  []WFL [x]Mild   [] Moderate  []Severe  []To be assessed  Impaired:   []Left side      []Right side    []Labial ROM/Coordination    []Labial Symmetry   [x]Lingual ROM/Coordination   []Lingual Symmetry  []Gag  []Other:     Oral Phase: []WFL [x]Mild   [] Moderate  []Severe  []To be assessed   [x]Impaired/Prolonged Mastication:   []Spillage Left:   []Spillage Right:  []Pocketing Left:   []Pocketing Right:   [x]Decreased Anterior to Posterior Transit:   [x]Suspected Premature Bolus Loss:   [x]Lingual/Palatal Residue:   []Other:     Pharyngeal Phase: []WFL [x]Mild   [x] Moderate  []Severe  []To be assessed   [x]Delayed Swallow:   []Suspected Pharyngeal Pooling:   [x]Decreased Laryngeal Elevation:   []Absent Swallow:  []Wet Vocal Quality:   []Throat Clearing-Immediate:   [x]Throat Clearing-Delayed:   [x]Cough-Immediate:   []Cough-Delayed:  []Change in Vital Signs:  [x]Suspected Delayed Pharyngeal Clearing:  []Other:       Eating Assistance:  []Independent  [x]Setup or clean-up assistance   [] Supervision or touching assistance   [] Partial or moderate assistance   [] Substantial or maximal assistance  [] Dependent       EDUCATION:   Provided education regarding role of SLP, results of assessment, recommendations and general speech pathology plan of care. [x] Pt verbalized understanding and agreement   [x] Pt requires ongoing learning   [] No evidence of comprehension     If patient discharges prior to next visit, this note will serve as discharge. Timed Code Minutes: 0  Total Treatment Minutes: 27 minutes    Electronically signed by:  Debra Dunne M.S. [id number] [facility]. [id number] [facility]

## 2021-12-12 NOTE — PROGRESS NOTES
HOSPITALISTS PROGRESS NOTE    12/12/2021 8:39 AM        Name: Kelin Luna . Admitted: 12/10/2021  Primary Care Provider: Michaela Emerson MD (Tel: [phone])      CC: Shortness of breath    Brief Course: This 73 y.o. male with history of paroxysmal atrial fibrillation (on chronic anticoagulation with Eliquis), history of renal cancer, osteoarthritis, essential hypertension, who presents to the emergency room with complaints of shortness of breath, recent diarrhea, generalized weakness. Patient was also noted to be hypoxic with oxygen saturation in the 80s. He was tachycardic with heart rate in the 150s. On presentation to the emergency room, lactic acid is elevated, chest x-ray concerning for pneumonia    Interval history: Pt seen and examined today. Overnight events noted, interval ancillary notes and labs reviewed. On 3L N/C sat around 100%   On pressors for BP support  Afebrile overnight; WBCs: 4.5 , procal: 9.80  Loose BM overnight ; c diff -ve ; on loperamide   Low pot; recplacement ordered   Cr trended up to 3.1   denied cough, SOB, chest pain, nausea, vomiting or abdominal pain  Palliative care consulted for clarification of goals of care and CODE STATUS discussion      Assessment & Plan:     Pneumonia. COVID-19 test, antigen studies (strep and Legionella) and blood cultures pending. CTA chest negative for PE but showed 5.1 cm left infrahilar mass, multiple enlarged right hilar lymph nodes and small left-sided pleural effusion. Moderate size consolidation in left lower lobe  Start Zyvox and Zosyn. Start breathing treatments. Sepsis secondary to pneumonia. Continue intravenous fluid, antibiotics, steroid and trend lactic acid level. Follow pending culture results. Acute respiratory failure with hypoxia. Secondary to pneumonia. Continue supplemental oxygen with plan to wean as tolerated.     Monitor pulse oximeter closely. Check blood gas. Atrial fibrillation with rapid ventricular response. Status post 10 mg of IV Cardizem in the emergency room. Started on amiodarone infusion. Continue home dose of Eliquis. Cardiology on board: Per the recs amiodarone can be used for short-term if needed during hospitalization    Nonzero troponin. Likely secondary to rapid atrial fibrillation/tachycardia, demand ischemia. Already on full dose anticoagulation (for the indication). Trend troponin and on telemetry. Hx of metastatic kidney cancer with mets to bone and mediastinal nodes  Oncology on board: Appreciate recs  CT scan showed showed significant progression of disease with increase in size of left renal mass, increase in size of left hilar/mediastinal lymphadenopathy  Palliative care consulted for goals of care and code discussion    SYL: Likely prerenal in setting of hypertension   creatinine trended up to 1.8  Nephrology consulted: Appreciate recs  Avoid nephrotoxin  Daily weights and strict intake output  Monitor renal function closely    Lactic acidosis. Secondary to underlying infectious process/cancer  Trend lactate    Other comorbidities: History of renal cancer, osteoarthritis, essential hypertension.       DVT PPX: Eliquis  Code:Full Code  Diet: Diet NPO    Disposition:     Current Medications  diphenoxylate-atropine (LOMOTIL) 2.5-0.025 MG per tablet 2 tablet, 4x Daily PRN  apixaban (ELIQUIS) tablet 5 mg, BID  atorvastatin (LIPITOR) tablet 40 mg, Nightly  megestrol (MEGACE) tablet 40 mg, Daily  oxyCODONE (ROXICODONE) immediate release tablet 5 mg, Q4H PRN  sodium chloride flush 0.9 % injection 5-40 mL, 2 times per day  sodium chloride flush 0.9 % injection 5-40 mL, PRN  0.9 % sodium chloride infusion, PRN  acetaminophen (TYLENOL) tablet 650 mg, Q6H PRN   Or  acetaminophen (TYLENOL) suppository 650 mg, Q6H PRN  hydrocortisone sodium succinate PF (SOLU-CORTEF) injection 50 mg, Q6H  LORazepam

## 2021-12-12 NOTE — PROGRESS NOTES
[facility]   Progress Note      HPI:     Patient well-known to me from the office. He has stage IV kidney cancer with metastasis to the bones and mediastinal lymph nodes diagnosed in 2021.  -He has been on systemic immunotherapy consisting of ipilimumab and nivolumab and has received 3 cycles so far. C3 was done on 2021. Received Zometa  -He also has received palliative radiation to L3 spine and left pelvis from 2021 to 10/13/2021. Now he is hospitalized with shortness of breath and tachycardia. CT chest has shown left lower lobe airspace disease. He has been started on Zosyn and Zyvox. CT scans have shown significant progression of the disease with increase in the size of left renal mass, increase in the size of left hilar/mediastinal lymphadenopathy. No fever chills. His appetite has not been great  He has chronic low back pain. SUBJECTIVE:    Patient is much more alert. Patient sisters and older son is by bedside. No headaches or dizziness. No chest pain  He does have some shortness of breath  He has back pain. Has diarrhea    OBJECTIVE    Physical  VITALS:  /64   Pulse 87   Temp 96.7 °F (35.9 °C) (Temporal)   Resp 23   Wt 173 lb 14.4 oz (78.9 kg)   SpO2 100%   BMI 22.33 kg/m²   TEMPERATURE:  Current - Temp: 96.7 °F (35.9 °C); Max - Temp  Av.4 °F (36.3 °C)  Min: 96.7 °F (35.9 °C)  Max: 98.4 °F (36.9 °C)  BLOOD PRESSURE RANGE:  Systolic (40XZP), WMT:77 , Min:78 , BOV:613   ; Diastolic (33QCE), OAJ:99, Min:49, Max:79    24HR INTAKE/OUTPUT:      Intake/Output Summary (Last 24 hours) at 2021 1422  Last data filed at 2021 0433  Gross per 24 hour   Intake 2406.9 ml   Output 200 ml   Net 2206.9 ml       Conscious alert oriented. Appears comfortable. No neck fullness. Respiratory efforts are normal.  Abdomen is not distended. No leg edema  No focal deficits.     Data  Labs:  General Labs:  CBC with Differential:    Lab Results Component Value Date    WBC 4.5 12/12/2021    RBC 3.38 12/12/2021    HGB 7.9 12/12/2021    HCT 24.1 12/12/2021     12/12/2021    MCV 71.3 12/12/2021    MCH 23.2 12/12/2021    MCHC 32.5 12/12/2021    RDW 18.9 12/12/2021    SEGSPCT 77.8 03/24/2011    LYMPHOPCT 11.7 12/12/2021    MONOPCT 9.6 12/12/2021    EOSPCT 0.7 03/24/2011    BASOPCT 0.1 12/12/2021    MONOSABS 0.4 12/12/2021    LYMPHSABS 0.5 12/12/2021    EOSABS 0.0 12/12/2021    BASOSABS 0.0 12/12/2021    DIFFTYPE Auto 03/24/2011     BMP:    Lab Results   Component Value Date     12/12/2021    K 3.2 12/12/2021    K 3.5 12/11/2021    CL 91 12/12/2021    CO2 16 12/12/2021    BUN 48 12/12/2021    LABALBU 3.3 12/10/2021    CREATININE 3.1 12/12/2021    CALCIUM 6.9 12/12/2021    GFRAA 24 12/12/2021    GFRAA >60 03/24/2011    LABGLOM 20 12/12/2021    GLUCOSE 142 12/12/2021     Hepatic Function Panel:    Lab Results   Component Value Date    ALKPHOS 69 12/10/2021    ALT 8 12/10/2021    AST 7 12/10/2021    PROT 7.7 12/10/2021    PROT 5.8 03/24/2011    BILITOT 0.6 12/10/2021    BILIDIR <0.2 10/07/2021    IBILI see below 10/07/2021    LABALBU 3.3 12/10/2021     LDH:    Lab Results   Component Value Date     09/12/2021     PT/INR:    Lab Results   Component Value Date    PROTIME 20.9 12/10/2021    INR 1.80 12/10/2021     PTT:    Lab Results   Component Value Date    APTT 36.5 12/10/2021   [APTT    Current Medications  Current Facility-Administered Medications: diphenoxylate-atropine (LOMOTIL) 2.5-0.025 MG per tablet 2 tablet, 2 tablet, Oral, 4x Daily PRN  potassium chloride 10 mEq/100 mL IVPB (Peripheral Line), 10 mEq, IntraVENous, Q1H  apixaban (ELIQUIS) tablet 5 mg, 5 mg, Oral, BID  atorvastatin (LIPITOR) tablet 40 mg, 40 mg, Oral, Nightly  megestrol (MEGACE) tablet 40 mg, 40 mg, Oral, Daily  oxyCODONE (ROXICODONE) immediate release tablet 5 mg, 5 mg, Oral, Q4H PRN  sodium chloride flush 0.9 % injection 5-40 mL, 5-40 mL, IntraVENous, 2 times per day  sodium chloride flush 0.9 % injection 5-40 mL, 5-40 mL, IntraVENous, PRN  0.9 % sodium chloride infusion, 25 mL, IntraVENous, PRN  acetaminophen (TYLENOL) tablet 650 mg, 650 mg, Oral, Q6H PRN **OR** acetaminophen (TYLENOL) suppository 650 mg, 650 mg, Rectal, Q6H PRN  hydrocortisone sodium succinate PF (SOLU-CORTEF) injection 50 mg, 50 mg, IntraVENous, Q6H  LORazepam (ATIVAN) tablet 0.5 mg, 0.5 mg, Oral, Q4H PRN  piperacillin-tazobactam (ZOSYN) 3,375 mg in dextrose 5 % 50 mL IVPB extended infusion (mini-bag), 3,375 mg, IntraVENous, Q12H  morphine (MS CONTIN) extended release tablet 15 mg, 15 mg, Oral, 2 times per day  sodium bicarbonate 75 mEq in sodium chloride 0.45 % 1,000 mL infusion, , IntraVENous, Continuous  norepinephrine (LEVOPHED) 16 mg in sodium chloride 0.9 % 250 mL infusion, 2-100 mcg/min, IntraVENous, Continuous  ipratropium-albuterol (DUONEB) nebulizer solution 1 ampule, 1 ampule, Inhalation, 4x daily  albuterol (PROVENTIL) nebulizer solution 2.5 mg, 2.5 mg, Nebulization, Q4H PRN  linezolid (ZYVOX) IVPB 600 mg, 600 mg, IntraVENous, Q12H  [COMPLETED] amiodarone (CORDARONE) 150 mg in dextrose 5 % 100 mL bolus, 150 mg, IntraVENous, Once **FOLLOWED BY** [] amiodarone (CORDARONE) 450 mg in dextrose 5 % 250 mL infusion, 1 mg/min, IntraVENous, Continuous **FOLLOWED BY** amiodarone (CORDARONE) 450 mg in dextrose 5 % 250 mL infusion, 0.5 mg/min, IntraVENous, Continuous    ASSESSMENT AND PLAN    79-year-old gentleman has    1. Metastatic kidney cancer to the bones and mediastinal lymph nodes:    -Diagnosed in 2021  -See above for treatments received. -Based on the imaging studies performed on 12/10/2021, he appears to have progressive disease.     - Spoke to the patient as well as patient's sisters & son.  -Tamara Diandra him about progression of the disease.  -Further treatment options would be second line of treatment for metastatic renal cell carcinoma with tyrosine kinase inhibitor or veg F inhibitor.  -Best supportive care option was discussed with the son in private at his request.  -We will request next generation sequencing on the pathology specimen from September 2021. -If adequate specimen is not available and if patient wants to pursue further treatments, will have to get more tissue preferably by bronchoscopy. 2.  Respiratory failure, hypoxia:    -He is on antibiotics for pneumonia  - Pulmonary consult appreciated. -Continue antibiotics    3. Diarrhea:    -C. difficile negative  -Probably due to checkpoint inhibitors  -He is on hydrocortisone. 3.  Anemia:    -Anemia of chronic disease  -Transfuse PRBCs if hemoglobin is less than 7    4. Neoplasm related pain:    -MS Contin and oxycodone    5.   A. fib with RVR:    -Evaluated by cardiology  -Started anticoagulation and amiodarone    -D/W Dr. Van López MD

## 2021-12-12 NOTE — PROGRESS NOTES
Assessment completed and documented. Denies pain at this time. Brief changed and zinc paste applied. Will continue to monitor.

## 2021-12-12 NOTE — PROGRESS NOTES
Pt alert and oriented but forgetful on 3L O2. PT having frequent liquid green BM X14 this shift at the time of this note. Cdiff negative. Imodium ineffective. Bolus of imodium ordered by MD awaiting results. [facility] family members updated on pt condition and suggested they start a conversation on goals of care, as the root cause of current illness appears to be related to advanced cancer metastasis. Pt stated on levo due to low BP, titrated to maintain MAP of 65.

## 2021-12-12 NOTE — PLAN OF CARE
Problem: Falls - Risk of:  Goal: Will remain free from falls  Description: Will remain free from falls  Outcome: Ongoing  Goal: Absence of physical injury  Description: Absence of physical injury  Outcome: Ongoing     Problem: Infection:  Goal: Will remain free from infection  Description: Will remain free from infection  Outcome: Ongoing     Problem: Safety:  Goal: Free from accidental physical injury  Description: Free from accidental physical injury  Outcome: Ongoing  Goal: Free from intentional harm  Description: Free from intentional harm  Outcome: Ongoing  Goal: Ability to remain free from injury will improve  Description: Ability to remain free from injury will improve  Outcome: Ongoing     Problem: Daily Care:  Goal: Daily care needs are met  Description: Daily care needs are met  Outcome: Ongoing     Problem: Pain:  Goal: Patient's pain/discomfort is manageable  Description: Patient's pain/discomfort is manageable  Outcome: Ongoing     Problem: Skin Integrity:  Goal: Skin integrity will stabilize  Description: Skin integrity will stabilize  Outcome: Ongoing     Problem: Discharge Planning:  Goal: Patients continuum of care needs are met  Description: Patients continuum of care needs are met  Outcome: Ongoing     Problem: Coping:  Goal: Ability to remain calm will improve  Description: Ability to remain calm will improve  Outcome: Ongoing     Problem: Self-Care:  Goal: Ability to participate in self-care as condition permits will improve  Description: Ability to participate in self-care as condition permits will improve  Outcome: Ongoing     Problem: ABCDS Injury Assessment  Goal: Absence of physical injury  Outcome: Ongoing     Problem: Skin Integrity:  Goal: Will show no infection signs and symptoms  Description: Will show no infection signs and symptoms  Outcome: Ongoing  Goal: Absence of new skin breakdown  Description: Absence of new skin breakdown  Outcome: Ongoing

## 2021-12-12 NOTE — PROGRESS NOTES
Nephrology progress Note  [phone]   [facility] [employer] [place] [facility]       Patient:  Hima Rae   : 1950    Brief HPI  Mr Warren Hogue is a 72 yo male with h/o renal cell carcinoma metastatic diagnosed in 2021, HTN, Gout, Atrial fibrillation on AC, Osteoarthritis came in to ER with generalized weakness, shortness of breath and diarrhea. He was found to be hypoxic and tachycardic with HRs upto 150s. Subsequently also became hypotensive to systolics of 22S. Labs revealed SYL with a BUN/Cr of 38/3.6 and metabolic acidosis/lactic acidosis. CXR on admission showed mild cardiomegaly and mild central pulm congestion. A CT of the chest with contrast ruled out PE, but revealed a 5.1 cm infrahilar mass left which may represent enlarged conglomerate of lymph nodes, multiple enlarged right hilar lymph nodes, small left sided pleural effusion, moderate sized consolidation in the LLL concerning for an ASD. He is being treated for a possible pneumonia. In regards to his renal cell cancer, he was diagnosed in 2019, metastatic to bones and mediastinal LNs, on systemic immunotherapy consisting of iplimumab and nivolumab and has received 3 cycles so far. C3 done on 2021. Also is s/p palliative XRT to L3 spine and left pelvis from 2021 to 10/13/2021. He is currently being treated with broad-spectrum Abx for a possible pneumonia. We are consulted for SYL  Baseline creatinine is less than 1  Creatinine on admission was 1.3 and is 1.8 at this time  SYL was presumed to be secondary to volume depletion and has been on isotonic fluids.        Subjective/Interval history    Pt seen and examined  Creatinine continues to worsen  Hypokalemic this AM  On 2l NC  No chest pains  Sob better  Appetite better   Diarrhea ongoing  levophed off for the past few hours    Review of Systems   No abdominal pain  No fevers/chills     SHx:  Family at the bedside    Meds:  Scheduled Meds:   potassium chloride  10 mEq IntraVENous Q1H    apixaban  5 mg Oral BID    atorvastatin  40 mg Oral Nightly    megestrol  40 mg Oral Daily    sodium chloride flush  5-40 mL IntraVENous 2 times per day    hydrocortisone sodium succinate PF  50 mg IntraVENous Q6H    piperacillin-tazobactam  3,375 mg IntraVENous Q12H    morphine  15 mg Oral 2 times per day    ipratropium-albuterol  1 ampule Inhalation 4x daily    linezolid  600 mg IntraVENous Q12H     Continuous Infusions:   sodium chloride 25 mL (12/12/21 1215)    sodium bicarbonate infusion 75 mL/hr at 12/12/21 0946    norepinephrine 2 mcg/min (12/12/21 0433)    amiodarone Stopped (12/11/21 1423)     PRN Meds:.diphenoxylate-atropine, oxyCODONE, sodium chloride flush, sodium chloride, acetaminophen **OR** acetaminophen, LORazepam, albuterol      Vitals:  BP 98/63   Pulse 86   Temp 96.8 °F (36 °C) (Temporal)   Resp 20   Wt 173 lb 14.4 oz (78.9 kg)   SpO2 100%   BMI 22.33 kg/m²       Physical Exam  General : Awake, alert, oriented x3,  appears frail, ill-looking, not in pain or respiratory distress, resting in bed  HEENT : normocephalic, atraumatic, mucosa dry,  no palor or icterus  CVS: S1 S2 normal, regular rhythm, no murmurs or rubs. Lungs: decreased breath sounds at the bases  Abd: Soft, bowel sounds normal, non-tender.  Abdomen distended  Ext: LE edema + bilateral   : bladder non-distended, no tenderness over the bladder      Labs:  CBC with Differential:    Lab Results   Component Value Date    WBC 4.5 12/12/2021    RBC 3.38 12/12/2021    HGB 7.9 12/12/2021    HCT 24.1 12/12/2021     12/12/2021    MCV 71.3 12/12/2021    MCH 23.2 12/12/2021    MCHC 32.5 12/12/2021    RDW 18.9 12/12/2021    SEGSPCT 77.8 03/24/2011    LYMPHOPCT 11.7 12/12/2021    MONOPCT 9.6 12/12/2021    EOSPCT 0.7 03/24/2011    BASOPCT 0.1 12/12/2021    MONOSABS 0.4 12/12/2021    LYMPHSABS 0.5 12/12/2021    EOSABS 0.0 12/12/2021    BASOSABS 0.0 12/12/2021    DIFFTYPE Auto 03/24/2011     BMP:    Lab Results   Component Value Date     12/12/2021    K 3.2 12/12/2021    K 3.5 12/11/2021    CL 91 12/12/2021    CO2 16 12/12/2021    BUN 48 12/12/2021    LABALBU 3.3 12/10/2021    CREATININE 3.1 12/12/2021    CALCIUM 6.9 12/12/2021    GFRAA 24 12/12/2021    GFRAA >60 03/24/2011    LABGLOM 20 12/12/2021    GLUCOSE 142 12/12/2021     Ionized Calcium:  No results found for: IONCA  Magnesium:    Lab Results   Component Value Date    MG 2.30 12/12/2021     Phosphorus:    Lab Results   Component Value Date    PHOS 2.6 10/14/2021     Warfarin PT/INR:  No components found for: PTPATWAR, PTINRWAR  PTT:    Lab Results   Component Value Date    APTT 36.5 12/10/2021   [APTT}  Troponin:    Lab Results   Component Value Date    TROPONINI 0.03 12/11/2021     Last 3 Troponin:    Lab Results   Component Value Date    TROPONINI 0.03 12/11/2021    TROPONINI 0.04 12/11/2021    TROPONINI 0.02 12/11/2021       Assessment/Plan:    Acute Kidney injury   Baseline creatinine less than 1  Creatinine on admission was 1.3 and is worsening  Suspect ATN, multifactorial due to volume depletion/contrast exposure   Allergic interstitial nephritis secondary to immunotherapy in the differential too. Imaging with no hydronephrosis  Daily renal panel  No indication for dialysis at this time  Avoid nephrotoxins including IV contrast, NSAIDs, ACEI/ARBs.   Check bladder scan      Acidosis metabolic  Secondary to lactic acidosis/sepsis  Continue bicarb gtt, change to isotonic     Hypokalemia  Due to diarrhea and poor po intake  Give additional kcl 40 meq po x1    Hyponatremia   Suspect SIADH  Check urine sodium and osmolality  Keep on fluid restriction     Hypomagnesemia  Repleted    Pneumonia  Suspecting post obstructive  On ABx     Renal cell cancer left  Metastatic  On immunotherapy  S/p XRT to bone mets  Oncology on board.      Atrial fibrillation with RVR  On Apixaban and amiodarone      Kim Nielsen MD, MD.  12/12/2021  Office Phone : [phone]    Thank you for allowing us to participate in the care of this pt. I willcontinue to follow along. Please call with questions or concerns.

## 2021-12-12 NOTE — CONSULTS
PULMONARY AND CRITICAL CARE MEDICINE CONSULTATION NOTE    CONSULTING PHYSICIAN:  Bret Webb MD    ADMISSION DATE: 12/10/2021  ADMISSION DIAGNOSIS: Paroxysmal atrial fibrillation (HCC) [I48.0]  Septicemia ([address] [A41.9]  Atrial fibrillation with rapid ventricular response ([address] [I48.91]  Sepsis due to pneumonia ([facility] [J18.9, A41.9]  Pneumonia of left lower lobe due to infectious organism [J18.9]    REASON FOR CONSULT:   Chief Complaint   Patient presents with    Shortness of Breath     pt in from home via [facility] reporting tachycardia and hypoxia, 's hypoxic in the 80's       DATE OF CONSULT: 12/10/2021    HISTORY OF PRESENT ILLNESS: 73y.o. year old male with a past medical history significant for metastatic renal cancer, hypertension, gout who presented to the hospital with shortness of breath and tachycardia. Patient is known to have stage IV kidney disease with metastasis to bones, mediastinal lymph nodes, liver. He has been on systemic immunotherapy with most recent therapy given on 12/2/121. He also received palliative radiation to L3 spine as well as left pelvis. Chest imaging done at the time of admission showed left lung airspace disease. Patient was noted to have hypotension as well as lactic acidosis. Also having profuse diarrhea. Pulmonary has been consulted to distinguish between pneumonia versus pneumonitis. At the time of interview patient is lying in bed in no apparent respiratory distress. Currently on 3 L/min of oxygen supplementation saturating 100%. Also in A. fib with controlled rate. Denies any chest pain or chest tightness. Minimal expectoration. Continues to have intermittent cough. Unaware and behaving surprised unknowing that he has a metastatic malignancy. REVIEW OF SYSTEMS:     CONSTITUTIONAL SYMPTOMS: The patient denies fever, fatigue, night sweats, weight loss or weight gain. HEENT: No vision changes. No tinnitus, Denies sinus pain.  No Current Outpatient Medications on File Prior to Encounter   Medication Sig Dispense Refill    apixaban (ELIQUIS) 5 MG TABS tablet Take 1 tablet by mouth 2 times daily 180 tablet 1    oxyCODONE 5 MG capsule Take 5 mg by mouth every 4 hours as needed for Pain.  megestrol (MEGACE) 40 MG tablet Take 40 mg by mouth daily      dexamethasone (DECADRON) 2 MG tablet Take 1 tablet by mouth daily Patient states not taking      morphine (MS CONTIN) 15 MG extended release tablet Take 15 mg by mouth every 12 hours.  atorvastatin (LIPITOR) 40 MG tablet Take 1 tablet by mouth nightly 30 tablet 3    aspirin 81 MG EC tablet Take 1 tablet by mouth daily 30 tablet 3    isosorbide mononitrate (IMDUR) 30 MG extended release tablet Take 30 mg by mouth daily      MULTIPLE VITAMIN PO Take 1 tablet by mouth daily.  apixaban  5 mg Oral BID    atorvastatin  40 mg Oral Nightly    megestrol  40 mg Oral Daily    sodium chloride flush  5-40 mL IntraVENous 2 times per day    hydrocortisone sodium succinate PF  50 mg IntraVENous Q6H    piperacillin-tazobactam  3,375 mg IntraVENous Q12H    morphine  15 mg Oral 2 times per day    ipratropium-albuterol  1 ampule Inhalation 4x daily    linezolid  600 mg IntraVENous Q12H      sodium chloride 25 mL (12/12/21 1215)    sodium bicarbonate infusion 75 mL/hr at 12/12/21 0946    norepinephrine 2 mcg/min (12/12/21 0433)    amiodarone Stopped (12/11/21 1423)     diphenoxylate-atropine, oxyCODONE, sodium chloride flush, sodium chloride, acetaminophen **OR** acetaminophen, LORazepam, albuterol      ALLERGIES:   Allergies as of 12/10/2021    (No Known Allergies)      OBJECTIVE:   weight is 173 lb 14.4 oz (78.9 kg). His temporal temperature is 96.7 °F (35.9 °C). His blood pressure is 102/69 and his pulse is 86. His respiration is 19 and oxygen saturation is 100%. No intake/output data recorded.      PHYSICAL EXAM:    CONSTITUTIONAL: He is a 73y.o.-year-old who appears his stated age. He is alert and oriented x 3 and in no acute distress. HEENT: PERRLA, EOMI. No scleral icterus. No thrush, atraumatic, normocephalic. NECK: Supple, without cervical or supraclavicular lymphadenopathy:  CARDIOVASCULAR: S1 S2 RRR. Without murmer  RESPIRATORY & CHEST: Bibasilar scattered crackles heard. No wheezing heard. GASTROINTESTINAL & ABDOMEN: Soft, nontender, positive bowel sounds in all quadrants, non-distended, without hepatosplenomegaly. GENITOURINARY: Deferred. MUSCULOSKELETAL: No tenderness to palpation of the axial skeleton. There is no clubbing. No cyanosis. No edema of the lower extremities. SKIN OF BODY: No rash or jaundice. PSYCHIATRIC EVALUATION: Normal affect. Patient answers questions appropriately. HEMATOLOGIC/LYMPHATIC/ IMMUNOLOGIC: No palpable lymphadenopathy. NEUROLOGIC: Alert and oriented x 3. Groslly non-focal. Motor strength is 5+/5 in all muscle groups. The patient has a normal sensorium globally.       LABS:  Recent Labs     12/10/21  1917 12/11/21  0442 12/12/21  0440   WBC 2.6* 4.5 4.5   HGB 10.3* 8.5* 7.9*   HCT 32.5* 26.5* 24.1*    372 334   ALT 8*  --   --    AST 7*  --   --    * 130* 128*   K 4.2 3.5 3.2*    98* 91*   CREATININE 1.3 1.8* 3.1*   BUN 27* 30* 48*   CO2 14* 14* 16*   INR 1.80*  --   --        Recent Labs     12/10/21  1917 12/11/21  0442 12/12/21  0440   GLUCOSE 140* 233* 142*   CALCIUM 8.4 7.2* 6.9*   * 130* 128*   K 4.2 3.5 3.2*   CO2 14* 14* 16*    98* 91*   BUN 27* 30* 48*   CREATININE 1.3 1.8* 3.1*       Recent Labs     12/10/21  2242   PHART 7.488*   [id number] 17.2*   PO2ART 80.8   VCB7KFN 13.0*   B1SIGYZG 97.4   BEART -8.8*       Lab Results   Component Value Date    INR 1.80 (H) 12/10/2021    INR 1.21 (H) 09/13/2021    INR 1.43 (H) 09/10/2021    PROTIME 20.9 (H) 12/10/2021    PROTIME 13.8 (H) 09/13/2021    PROTIME 16.4 (H) 09/10/2021     Lab Results   Component Value Date    AMYLASE 28 12/10/2021      Lab Results   Component Value Date    LABA1C 5.6 09/11/2021     Lab Results   Component Value Date    .0 09/11/2021     Lab Results   Component Value Date    T4FREE 0.9 11/08/2021     Lab Results   Component Value Date    TROPONINI 0.03 (H) 12/11/2021      Lab Results   Component Value Date    .6 (H) 12/10/2021      No results found for: BNP   Lab Results   Component Value Date    DDIMER 3514 (H) 12/11/2021      Lab Results   Component Value Date    FERRITIN 1,400.0 (H) 09/14/2021      Lab Results   Component Value Date    LACTA 2.1 (H) 12/12/2021           IMAGING:    Narrative   EXAMINATION:   CTA OF THE CHEST 12/10/2021 10:07 pm           FINDINGS:   Pulmonary Arteries: Pulmonary arteries are adequately opacified for   evaluation.  No evidence of intraluminal filling defect to suggest pulmonary   embolism.  Main pulmonary artery is normal in caliber.       Mediastinum: No evidence of mediastinal lymphadenopathy.  The heart and   pericardium demonstrate no acute abnormality.  There is no acute abnormality   of the thoracic aorta.       Lungs/pleura: There is a 5.1 cm left infrahilar mass which may represent an   enlarged conglomerate of lymph nodes.  Multiple enlarged right hilar lymph   nodes are present.  Small left-sided pleural effusion.  Moderate size area of   consolidation in the left lower lobe, concerning for an airspace disease   process.       Upper Abdomen: Limited images of the upper abdomen are unremarkable.       Soft Tissues/Bones: Multiple sclerotic lesions are noted in the spine,   suggestive of metastatic disease.  A pathologic compression fracture of T2 is   noted.  Left upper chest Port-A-Cath with the distal tip in the SVC           Impression   No evidence of pulmonary embolism.       There is a 5.1 cm left infrahilar mass which may represent an enlarged   conglomerate of lymph nodes.  Multiple enlarged right hilar lymph nodes are   present. Small left-sided pleural effusion. Moderate size area of   consolidation in the left lower lobe, concerning for an airspace disease   process.               IMPRESSION:     1. Acute respiratory distress  2. Left lower lobe postobstructive pneumonia  3. Widely metastatic renal cancer  4. Lactic acidosis  5. Acute kidney injury  6. Failure to thrive  7. Hypoxic respiratory failure  8. Hyponatremia    RECOMMENDATION:     1. Patient has presented to the hospital with shortness of breath and tachycardia. Found to have left lower lobe airspace disease and A. fib with RVR. 2. I personally reviewed the CT chest and patient has a localized involvement of left lower lobe along with left-sided small pleural effusion. There is a left infrahilar mass which is putting an extraluminal pressure on the left lower lobe airway. Patient most likely has a postobstructive pneumonia. Procalcitonin is significantly elevated at admission. 3. Low suspicion for pneumonitis. The disease is very localized and the other constellation of findings suggest pneumonia. 4. Will advise to treat him with antibiotics at this time. 5. Performing a bronchoscopy and transbronchial lung biopsy, will require him to come off of Eliquis for 2 days at the very minimum. I do not think that transbronchial lung biopsy is indicated at this time. Treat the above findings as acute pneumonia. 6. Lactic acidosis most likely due to both increased production from tissue hypoperfusion as well as decreased clearance due to liver involvement. Slowly improving. 7. Oxygen requirements are minimal.  Can titrate to maintain SPO2 above 90%. 8. On very low-dose Levophed gtt. Titrate to systolic blood pressure of 90 and above. Patient should be able to come off of vasopressors. 9. Acute kidney injury most likely due to renal hypoperfusion and involvement with malignancy. Hyponatremia most likely due to SIADH. Nephrology on board. Will follow the recommendations.   10. Overall prognosis is very poor due to underlying disease. Palliative care should be consulted for goals of care. 11. Nothing further to add from critical care standpoint. Thank you for your consultation. We will sign off. Nader Santacruz MD  Pulmonary Critical Care and Sleep Medicine  12/10/2021, 12:30 PM    This note was completed using dragon medical speech recognition software. Grammatical errors, random word insertions, pronoun errors and incomplete sentences are occasional consequences of this technology due to software limitations. If there are questions or concerns about the content of this note of information contained within the body of this dictation they should be addressed with the provider for clarification.

## 2021-12-12 NOTE — PROGRESS NOTES
Speech Language Pathology  Dysphagia Treatment Note    Name:  Hima Rae  :   1950  Medical Diagnosis:  Paroxysmal atrial fibrillation ([address] [I48.0]  Septicemia ([address] [A41.9]  Atrial fibrillation with rapid ventricular response ([address] [I48.91]  Sepsis due to pneumonia ([address] [J18.9, A41.9]  Pneumonia of left lower lobe due to infectious organism [J18.9]  Treatment Diagnosis: Oropharyngeal Dysphagia  Pain level: Declined    Diet level prior to treatment: NPO with ongoing assessment, allow PO necessary meds  Tolerance of Current Diet Level: Per RN report, pt tolerating of meds with water    Assessment of Texture Tolerance:  -Impressions: Pt repositioned upright in bed upon SLP arrival with family present in room, currently on 3L O2 via nasal cannula. Pt endorses feeling and breathing better than yesterday during initial SLP eval. Oral motor exam completed; noted to be mildly reduced in lingual/labial ROM/coordination. Thin liquids via cup/straw, puree, soft solids and regular solids provided to reassess swallow function. Pt presents with a mild oropharyngeal dysphagia characterized by prolonged mastication, suspected premature bolus loss to pharynx, delayed swallow initiation and reduced laryngeal elevation upon manual palpation. Thin liquids via cup revealed suspected premature bolus loss to pharynx with a mildly delayed swallow initiation and no overt clinical s/s of aspiration/penetration. With thin liquids presented via straw, pt demonstrated one episode of throat clearing. Puree revealed adequate oral manipulation and clearance. Prolonged however functional mastication noted with solids. Pt utilized liquid wash for adequate oral clearance of regular textures. No overt clinical s/s of aspiration/penetration assessed with solids.  O2 sats remained stable at 100% across consistencies and RR was only elevated (27/min) following regular solids with presentation of thin liquids via straws but quickly stabilized following trials. Pt overall tolerating of all consistencies this date therefore recommend initiation of regular textures and thin liquids/no straws, meds as tolerated and use of safe-swallowing compensatory strategies and energy conservation techniques. Diet and Treatment Recommendations 12/12/2021:  Recommend Regular Textures with Thin Liquids/No straws, meds as tolerated    Compensatory strategies: Alternate solids/liquids , Upright as possible with all PO intake , No straws , Small bites/sips , Eat/feed slowly, Remain upright 30-45 min     Dysphagia Goals:   Speech therapy for dysphagia tx 3-5 times per week during acute care stay. 1. Pt will functionally tolerate ongoing assessment of swallow function with diet to be determined as indicated GOAL MET  2. Pt will demonstrate understanding of aspiration risk and precautions via education/demonstration with occasional prompting (ongoing 12/12/2021)  3. If clinical s/s of aspiration/penetration continue to be noted, Pt will participate in Modified Barium Swallow Study (ongoing 12/12/2021)  4. NEW GOAL Pt will tolerate recommended diet with no overt clinical s/s of aspiration/penetration . Plan:  Continued daily Dysphagia treatment with goals per plan of care. Patient/Family Education:Education given to the Pt and nurse, who verbalized understanding    Discharge Recommendations:  Pt will benefit from continued skilled Speech Therapy for Dysphagia services, prior to returning home. Timed Code Treatment: 0 minutes     Total Treatment Time: 15 minutes    If patient discharges prior to next session this note will serve as a discharge summary.      Signature:   Adrianna Trevino, [address]  Speech-Language Pathologist  12/12/2021 10:45 AM

## 2021-12-13 PROBLEM — E44.0 MODERATE MALNUTRITION (HCC): Chronic | Status: ACTIVE | Noted: 2021-01-01

## 2021-12-13 NOTE — PROGRESS NOTES
Occupational Therapy   Occupational Therapy Initial Assessment  Date: 2021   Patient Name: Astrid Lira  MRN: [id number]     : 1950    Date of Service: 2021    Discharge Recommendations:Titi Brown scored a 14/24 on the AM-PAC ADL Inpatient form. Current research shows that an AM-PAC score of 17 or less is typically not associated with a discharge to the patient's home setting. Based on the patient's AM-PAC score and their current ADL deficits, it is recommended that the patient have 3-5 sessions per week of Occupational Therapy at d/c to increase the patient's independence. Please see assessment section for further patient specific details. If patient discharges prior to next session this note will serve as a discharge summary. Please see below for the latest assessment towards goals. OT Equipment Recommendations  Equipment Needed: Yes (continue to assess based on progress, patient will require bsc, shower bench)    Assessment   Performance deficits / Impairments: Decreased functional mobility ; Decreased ADL status; Decreased cognition; Decreased endurance; Decreased safe awareness; Decreased high-level IADLs; Decreased strength  Treatment Diagnosis: Above deficits associated with A-Fib  Prognosis: Good  Decision Making: Medium Complexity  History: I PTA, recent dx of stage 4  Exam: as above  OT Education: OT Role; Plan of Care  Patient Education: Eval, discharge recommendations-patient verbalized understanding  Barriers to Learning: cognition  REQUIRES OT FOLLOW UP: Yes  Activity Tolerance  Activity Tolerance: Patient Tolerated treatment well; Patient limited by fatigue  Safety Devices  Safety Devices in place: Yes  Type of devices: All fall risk precautions in place; Call light within reach; Patient at risk for falls; Chair alarm in place; Nurse notified;  Left in chair           Patient Diagnosis(es): The primary encounter diagnosis was Pneumonia of left lower lobe due to infectious organism. Diagnoses of Septicemia ([facility] [address] Paroxysmal atrial fibrillation ([facility] [address] and Atrial fibrillation with rapid ventricular response ([facility] [address] were also pertinent to this visit. has a past medical history of Arthritis, Cancer ([facility] [address] De Quervain's tenosynovitis, Diverticulitis, Gout, and HTN (hypertension). has a past surgical history that includes hip surgery; Liver surgery; hernia repair; hip surgery (3/22/11); CT BIOPSY DEEP BONE PERCUTANEOUS (9/13/2021); and Port Surgery (Left, 11/8/2021). Treatment Diagnosis: Above deficits associated with A-Fib      Restrictions  Restrictions/Precautions  Restrictions/Precautions: Fall Risk (medium fall risk)  Position Activity Restriction  Other position/activity restrictions: 73 y.o. male here today with a chief complaint of diarrhea fatigue and weakness at home. Called EMS because he was too weak to get up and move pounds. Known underlying metastatic malignancy with recent permacath placement as well as prior history of A. fib on anticoagulation with Eliquis at baseline maintained on no beta-blockade or calcium channel blockade that I can ascertain. By EMS and A. fib with RVR with a rate of 150. Underlying stage IV renal cell carcinoma for localized metastatic disease. Hemoglobin 7.7 baseline    Subjective   General  Chart Reviewed: Yes  Family / Caregiver Present: Yes (spouse, Bj Wilson)  Diagnosis: Paroxysmal A-Fib  Subjective  Subjective: Patient supine in bed, agreeable to evaluation  Patient Currently in Pain: Yes  Pain Assessment  Pain Assessment: 0-10  Pain Level: 5  Vital Signs  Patient Currently in Pain: Yes  Height and Weight  Height: 6' 2\" (188 cm)  Social/Functional History  Social/Functional History  Lives With: Spouse  Type of Home: House  Home Layout: One level  Home Access: Stairs to enter without rails  Entrance Stairs - Number of Steps: 1+1  Bathroom Shower/Tub: Tub/Shower unit (Patient has been sponge bathing)  Bathroom Toilet: Standard  Bathroom Equipment: Grab bars in shower  Home Equipment: Rolling walker  ADL Assistance: Independent  Homemaking Responsibilities: No  Ambulation Assistance: Independent (RW in home, scooter for distance)  Transfer Assistance: Independent  Active : Yes  Occupation: Retired  Type of occupation: [employer]  Additional Comments: Falls: no falls in the last 6 months       Objective   Vision: Impaired  Vision Exceptions: Wears glasses at all times  Hearing: Exceptions to [facility]  Hearing Exceptions: Hard of hearing/hearing concerns    Orientation  Overall Orientation Status: Within Functional Limits     Balance  Standing Balance: Dependent/Total (sit>stand modA, stand step modA of 2 with RW)  Standing Balance  Time: Patient sat EOB x 6-8 minutes, stance x 1-2 minutes (2 trials with static stance + stand step transfer)  Wheelchair Bed Transfers  Wheelchair/Bed - Technique: Stand step  Equipment Used: Bed; Other  Level of Asssistance: 2 Person assistance (modA of 2)  ADL  Feeding: Setup  LE Dressing: Dependent/Total  Toileting: Dependent/Total  Additional Comments: Patient unable to tolerate ADLs  Tone RUE  RUE Tone: Normotonic  Tone LUE  LUE Tone: Normotonic  Coordination  Movements Are Fluid And Coordinated: Yes     Bed mobility  Supine to Sit: Contact guard assistance  Scooting: Contact guard assistance  Transfers  Sit to stand: Moderate assistance  Stand to sit: Moderate assistance  Vision - Basic Assessment  Prior Vision: Wears glasses all the time  Patient Visual Report: No visual complaint reported. Cognition  Overall Cognitive Status: Exceptions  Arousal/Alertness: Delayed responses to stimuli  Following Commands: Follows one step commands with increased time;  Follows one step commands with repetition  Initiation: Requires cues for some  Sequencing: Requires cues for some  Perception  Overall Perceptual Status: WFL     Sensation  Overall Sensation Status: WFL        LUE AROM (degrees)  LUE AROM : WNL  RUE AROM (degrees)  RUE AROM : WNL  LUE Strength  L Hand General: 5/5  RUE Strength  R Hand General: 5/5                   Plan   Plan  Times per week: 3-5  Times per day: Daily  Current Treatment Recommendations: Strengthening, Balance Training, Functional Mobility Training, Endurance Training, Equipment Evaluation, Education, & procurement, Patient/Caregiver Education & Training, Self-Care / ADL, Home Management Training, Safety Education & Training      AM-PAC Score        AM-[facility] Inpatient Daily Activity Raw Score: 14 (12/13/21 1435)  AM-PAC Inpatient ADL T-Scale Score : 33.39 (12/13/21 1435)  ADL Inpatient CMS 0-100% Score: 59.67 (12/13/21 1435)  ADL Inpatient CMS G-Code Modifier : CK (12/13/21 1435)    Goals  Short term goals  Time Frame for Short term goals: Discharge  Short term goal 1: Bed mobility Juan Ramon  Short term goal 2: Functional ADL transfers Juan Ramon  Short term goal 3: Functional mobility with AD juan ramon  Short term goal 4: UB ADLs setup, LB modA  Long term goals  Time Frame for Long term goals : LTG=STG  Patient Goals   Patient goals : Home       Therapy Time   Individual Concurrent Group Co-treatment   Time In 1210         Time Out 1250         Minutes 40              Timed Code Treatment Minutes:   25    Total Treatment Minutes:  [address] [facility] OTR/L [id number]

## 2021-12-13 NOTE — CARE COORDINATION
Discharge Planning Assessment  Readmission risk score 20%  RN discharge planner met with patient/ (and family member) to discuss reason for admission, current living situation, and potential needs at the time of discharge    Demographics/Insurance verified Yes    Current type of dwelling:ranch style home 1 small entry step    Patient from ECF/SW confirmed with: n/a    Living arrangements: with spouse    Level of function/Support: previously independent    PCP: Hermelinda Cummings    Last Visit to PCP: September    DME: walker    Active with any community resources/agencies/skilled home care: not at present, is open to home care if needed    Medication compliance issues: uses [employer] in [employer] issues that could impact healthcare: not identified    Tentative discharge plan: home, consider home health    Discussed and provided facilities of choice if transition to a skilled nursing facility is required at the time of discharge      Discussed with patient and/or family that on the day of discharge home tentative time of discharge will be between 10 AM and noon.     Transportation at the time of discharge: wife    Richard ALY BatemanN, CCM, RN  [facility]  [phone]

## 2021-12-13 NOTE — PLAN OF CARE
Nutrition Problem #1: Moderate malnutrition  Intervention: Food and/or Nutrient Delivery: Continue Current Diet, Start Oral Nutrition Supplement  Nutritional Goals: po intake at least 50% of meals

## 2021-12-13 NOTE — PROGRESS NOTES
HOSPITALISTS PROGRESS NOTE    12/13/2021 6:09 PM        Name: King Velvet . Admitted: 12/10/2021  Primary Care Provider: Sergio Collado MD (Tel: [phone])      CC: Shortness of breath    Brief Course: This 73 y.o. male with history of paroxysmal atrial fibrillation (on chronic anticoagulation with Eliquis), history of renal cancer, osteoarthritis and essential hypertension presented with with complaints of shortness of breath, recent diarrhea, generalized weakness. Patient was also noted to be hypoxic with oxygen saturation in the 80s. He was tachycardic with heart rate in the 150s. On presentation to the emergency room, lactic acid is elevated, chest x-ray concerning for pneumonia      Interval history:   Pt seen and examined today. Overnight events noted, interval ancillary notes and labs reviewed. 2 L nasal cannula satting around 99%; wean as tolerated keep sat above 92  Afebrile overnight, WBCs around 2.6  Creatinine trended up to 3.9; per nephrology continue IV fluids no need for urgent HD  denied cough, SOB, chest pain, nausea, vomiting or abdominal pain  Palliative care consulted for clarification of goals of care and CODE STATUS discussion      Assessment & Plan:     Hx of metastatic kidney cancer with mets to bone and mediastinal nodes  Oncology on board: Appreciate recs  CT scan showed showed significant progression of disease with increase in size of left renal mass, increase in size of left hilar/mediastinal lymphadenopathy  Palliative care consulted for goals of care and code discussion    Left lower lobe post obstructive pneumonia   CTA chest negative for PE but showed 5.1 cm left infrahilar mass, multiple enlarged right hilar lymph nodes and small left-sided pleural effusion.   Moderate size consolidation in left lower lobe  Pulmonology on board: Appreciate the recs  Continue Zosyn  Supplemental oxygen as needed to keep sats above 92%  Continue as needed breathing treatment    Sepsis secondary to pneumonia. Continue intravenous fluid, antibiotics, steroid and trend lactic acid level. Follow pending culture results. Acute respiratory failure with hypoxia. Secondary to pneumonia. Continue supplemental oxygen with plan to wean as tolerated. Monitor pulse oximeter closely. Check blood gas. Atrial fibrillation with rapid ventricular response: Currently in normal sinus rhythm  Status post 10 mg of IV Cardizem in the emergency room. Eliquis held due to worsening kidney function: Started on heparin drip  Cardiology on board: Per the recs patient is not a candidate for any EP/cardiac intervention due to advanced metastatic cancer disease and multiple comorbidities   amiodarone can be used for short-term if needed during hospitalization    Nonzero troponin. Likely secondary to rapid atrial fibrillation/tachycardia, demand ischemia. Already on full dose anticoagulation (for the indication). Trend troponin and on telemetry. SYL: Likely prerenal in setting of hypertension   creatinine trended up to 3.9  Nephrology on board: Per the recs continue IV fluids no need for urgent HD indicated  Avoid nephrotoxin  Daily weights and strict intake output  Monitor renal function closely    Anion gap metabolic acidosis  likely secondary lactic acidosis/SYL    Diarrhea likely secondary to anemia colitis(patient on checkpoint inhibitors)  C. difficile negative:  Patient already on hydrocortisone  Statin as needed lipoma    Hypokalemia likely secondary to diarrhea  Replace as needed  Monitor potassium closely    Lactic acidosis. Multifactorial ; likely secondary to underlying infectious process/liver mets/hypoxia/dehydration  Trend lactate    Other comorbidities: History of renal cancer, osteoarthritis, essential hypertension.       DVT PPX: Heparin drip  Code:Full Code  Diet: ADULT ORAL NUTRITION SUPPLEMENT; Breakfast, 164 lb 11.2 oz (74.7 kg)       Physical Examination:   General appearance:  No apparent distress, appears stated age and cooperative. Eyes: Sclera clear. Pupils equal.  ENT: Moist oral mucosa. Trachea midline, no adenopathy. Cardiovascular: Regular rhythm, normal S1, S2. No murmur. No edema in lower extremities  Respiratory:Not using accessory muscles. Good inspiratory effort. Clear to auscultation bilaterally, no wheeze or crackles. GI: Abdomen soft, no tenderness, not distended, normal bowel sounds  Musculoskeletal: normal ROM, No cyanosis in digits  Neurology: CN 2-12 grossly intact. No speech or motor deficits  Psych: Normal affect. Alert and oriented in time, place and person  Skin: Warm, dry, normal turgor    Labs and Tests:  CBC:   Recent Labs     12/11/21  0442 12/12/21  0440 12/13/21  0632   WBC 4.5 4.5 2.6*   HGB 8.5* 7.9* 7.1*    334 266     BMP:    Recent Labs     12/12/21  0440 12/13/21  0632 12/13/21  1430   * 128* 127*   K 3.2* 2.8* 3.6   CL 91* 90* 90*   CO2 16* 17* 16*   BUN 48* 57* 60*   CREATININE 3.1* 3.9* 3.9*   GLUCOSE 142* 148* 134*     Hepatic:   Recent Labs     12/10/21  1917   AST 7*   ALT 8*   BILITOT 0.6   ALKPHOS 69     CT ABDOMEN PELVIS W IV CONTRAST Additional Contrast? None   Final Result   1. Interval enlargement left renal mass, which is now indistinguishable from   the pancreatic tail, concerning for underlying disease involvement. 2. Pathologic compression fractures of the L4 and L5 vertebral bodies. Suspected pathologic fracture of the L1 vertebral body. Additional lytic   lesions within the visualized osseous structures, compatible with metastatic   disease. 3. Subcentimeter hypoattenuating hepatic lesions, indeterminate. However,   given findings elsewhere, this is concerning for hepatic metastatic disease. 4. Interval progression of partially visualized intrathoracic disease with   postobstructive atelectasis versus pneumonia.    5. Grossly stable left adrenal mass. CT CHEST PULMONARY EMBOLISM W CONTRAST   Final Result   No evidence of pulmonary embolism. There is a 5.1 cm left infrahilar mass which may represent an enlarged   conglomerate of lymph nodes. Multiple enlarged right hilar lymph nodes are   present. Small left-sided pleural effusion. Moderate size area of   consolidation in the left lower lobe, concerning for an airspace disease   process. XR CHEST PORTABLE   Final Result   Mild cardiomegaly and mild central pulmonary congestion or pulmonary artery   hypertension which is more prominent. Hazy left basilar atelectasis or infiltrate which is more prominent. No change in left Port-A-Cath. Problem List  Active Problems:    Sepsis due to pneumonia ([address]    Moderate malnutrition ([address]  Resolved Problems:    * No resolved hospital problems.  Roly Almanzar MD   12/13/2021 6:09 PM

## 2021-12-13 NOTE — CONSULTS
Palliative Care: 73y.o. year old male with a past medical history significant for metastatic renal cancer, hypertension, gout who presented to the hospital with shortness of breath and tachycardia. Patient is known to have stage IV kidney disease with metastasis to bones, mediastinal lymph nodes, liver. He has been on systemic immunotherapy with most recent therapy given on 12/2/121. He also received palliative radiation to L3 spine as well as left pelvis. Chest imaging done at the time of admission showed left lung airspace disease. Patient was noted to have hypotension as well as lactic acidosis. Also having profuse diarrhea. Pulmonary has been consulted to distinguish between pneumonia versus pneumonitis. Patient admitted 12/10/21 and Palliative consult placed 12/11/21. Past Medical History:   has a past medical history of Arthritis, Cancer ([address] De Quervain's tenosynovitis, Diverticulitis, Gout, and HTN (hypertension). Past Surgical History:   has a past surgical history that includes hip surgery; Liver surgery; hernia repair; hip surgery (3/22/11); CT BIOPSY DEEP BONE PERCUTANEOUS (9/13/2021); and Port Surgery (Left, 11/8/2021). Advance Directives:   Full Code. No ACP in place. Problem Severity: Pain/Other Symptoms:  metastatic renal cancer. Rates pain @ 5. Bed Mobility/Toileting/Transfer:  Stand by assistance. Severe diarrhea since admit. Low potassium. Performance Status:        Palliative Performance Scale:  100% []Full Normal activity & work No evidence of disease  90%   [] Full Normal activity & work Some evidence of disease  80%   [] Full Normal activity with Effort Some evidence of disease  70%   [] Reduced Unable Normal Job/Work Significant disease Full Normal or reduced  60%   [] Ambulation reduced; Significant disease; Can't do hobbies/housework; intake normal   or reduced; occasional assist; LOC full/confusion  50%   [] Mainly sit/lie;  Extensive disease; Can't do any work; Considerable assist; intake normal  Or reduced; LOC full/confusion  40%   [x] Mainly in bed; Extensive disease; Mainly assist; intake normal or reduced; occasional assist; LOC full/confusion  30%   [] Bed Bound; Extensive disease; Total care; intake reduced; LOC full/confusion  20%   [] Bed Bound; Extensive disease; Total care; intake minimal; Drowsy/coma  10%   [] Bed Bound; Extensive disease; Total care; Mouth care only; Drowsy/coma    PPS 40%    Symptom Assessment: Appetite/Nausea/Bowels/Fatigue:    lbs. Albumin 3.3      Social History:   reports that he has quit smoking. His smoking use included cigars. He quit after 20.00 years of use. He has never used smokeless tobacco. He reports that he does not drink alcohol and does not use drugs. Family History:  family history includes Cancer in his mother; Heart Disease in his maternal grandmother. Psychological/Spiritual:     . Two children. Hindu. Family Discussion:     Patient A/O X3. Able to express all needs. Patient states his bed is broken and requesting replacement. Team notified. Appetite poor. Agrees to protein drinks. Will notify Dietician. Discussed Code status. Agrees with Full Code. Understands his disease is not curable yet trying to be proactive with aggressive therapies currently. Discussed ACP. Agrees to complete. Will notify Chaplains to assist.     Discussed Anabaptism. Is Hindu. Agreeable for  spiritual support. DC POC is to return to home setting. Will need DME due to overall weakness such as BSC. Has walker. Agreeable for skilled therapies in home setting. Discussed briefly Palliative to follow in home . States he will discuss with wife on this option. Palliative will continue to follow as needed.

## 2021-12-13 NOTE — PROGRESS NOTES
Physical Therapy    Facility/Department: [facility]  Initial Assessment    NAME: Codey Griggs  : 1950  MRN: [id number]    Date of Service: 2021    Discharge Recommendations:    Codey Griggs scored a 10/24 on the AM-PAC short mobility form. Current research shows that an AM-PAC score of 17 or less is typically not associated with a discharge to the patient's home setting. Based on the patient's AM-PAC score and their current functional mobility deficits, it is recommended that the patient have 3-5 sessions per week of Physical Therapy at d/c to increase the patient's independence. Please see assessment section for further patient specific details. If patient discharges prior to next session this note will serve as a discharge summary. Please see below for the latest assessment towards goals. PT Equipment Recommendations  Equipment Needed: No  Other: defer to next level of care    Assessment   Body structures, Functions, Activity limitations: Decreased functional mobility ; Increased pain; Decreased posture; Decreased balance; Decreased strength; Decreased safe awareness; Decreased endurance  Assessment: pt currently presents well below baseline and requires assist of 2 for safe functional mobility. pt will benefit from continued skilled therapy services to facilitate return to PLOF  Treatment Diagnosis: impaired functional mobility  Prognosis: Good  Decision Making: Medium Complexity  PT Education: PT Role; Plan of Care  Patient Education: pt verbalized understanding  REQUIRES PT FOLLOW UP: Yes  Activity Tolerance  Activity Tolerance: Patient limited by fatigue; Patient limited by pain; Patient limited by endurance       Patient Diagnosis(es): The primary encounter diagnosis was Pneumonia of left lower lobe due to infectious organism.  Diagnoses of Septicemia ([address] Paroxysmal atrial fibrillation ([address] and Atrial fibrillation with rapid ventricular response ([address] were also pertinent to this visit.     has a past medical history of Arthritis, Cancer [address] [address] De Quervain's tenosynovitis, Diverticulitis, Gout, and HTN (hypertension). has a past surgical history that includes hip surgery; Liver surgery; hernia repair; hip surgery (3/22/11); CT BIOPSY DEEP BONE PERCUTANEOUS (9/13/2021); and Port Surgery (Left, 11/8/2021). Restrictions  Restrictions/Precautions  Restrictions/Precautions: Fall Risk (medium fall risk)  Position Activity Restriction  Other position/activity restrictions: 73 y.o. male here today with a chief complaint of diarrhea fatigue and weakness at home. Called EMS because he was too weak to get up and move pounds. Known underlying metastatic malignancy with recent permacath placement as well as prior history of A. fib on anticoagulation with Eliquis at baseline maintained on no beta-blockade or calcium channel blockade that I can ascertain. By EMS and A. fib with RVR with a rate of 150. Underlying stage IV renal cell carcinoma for localized metastatic disease. Hemoglobin 7.7 baseline  Vision/Hearing  Vision: Impaired  Vision Exceptions: Wears glasses at all times  Hearing: Exceptions to [facility]  Hearing Exceptions: Hard of hearing/hearing concerns     Subjective  General  Chart Reviewed: Yes  Family / Caregiver Present: Yes (spouse)  Diagnosis: afib  Follows Commands: Within Functional Limits  Subjective  Subjective: pt supine at start of session, agreeable to therapy this afternoon  Pain Screening  Patient Currently in Pain: Yes  Pain Assessment  Pain Assessment: 0-10  Pain Level: 5  Pain Type: Chronic pain  Pain Location: Leg; Knee  Pain Orientation: Left  Vital Signs  Patient Currently in Pain: Yes       Orientation  Orientation  Overall Orientation Status: Within Functional Limits  Social/Functional History  Social/Functional History  Lives With: Spouse  Type of Home: House  Home Layout: One level  Home Access: Stairs to enter without rails  Entrance Stairs - Number of Steps: 1+1  Bathroom Shower/Tub: Tub/Shower unit (Patient has been sponge bathing)  Bathroom Toilet: Standard  Bathroom Equipment: Grab bars in shower  Home Equipment: Rolling walker  ADL Assistance: Independent  Homemaking Responsibilities: No  Ambulation Assistance: Independent (RW in home, scooter for distance)  Transfer Assistance: Independent  Active : Yes  Occupation: Retired  Type of occupation: [employer]  Additional Comments: Falls: no falls in the last 6 months    Objective     Observation/Palpation  Edema: increased LLE    AROM RLE (degrees)  RLE AROM: WFL  AROM LLE (degrees)  LLE AROM : WFL  Strength RLE  Comment: grossly 3/5  Strength LLE  Comment: grossly 3/5        Bed mobility  Supine to Sit: Contact guard assistance  Sit to Supine: Unable to assess (pt sitting up in recliner at end of session)  Scooting: Contact guard assistance  Transfers  Sit to Stand: Moderate Assistance  Stand to sit: Moderate Assistance (poor eccentric control)  Ambulation  Ambulation?: Yes  Ambulation 1  Surface: level tile  Device: Rolling Walker  Assistance: Dependent/Total (mod assist of 2)  Quality of Gait: narrow KIRK, assist to navigate RW, facilitation at pelvis for weight shifting and taking steps  Distance: 4-5 pivot steps bed to chair  Stairs/Curb  Stairs?: No     Balance  Posture: Fair  Sitting - Static: Good  Sitting - Dynamic: Fair  Standing - Static: Poor  Standing - Dynamic: Poor  Comments: EOB balance with SBA/CGA, unable to move outside of KIRK without assist        Plan   Plan  Times per week: 3-5  Times per day: Daily  Current Treatment Recommendations: Strengthening, Neuromuscular Re-education, ROM, Safety Education & Training, Balance Training, Endurance Training, Functional Mobility Training, Transfer Training, Pain Management, Positioning, Modalities, Gait Training  Safety Devices  Type of devices:  All fall risk precautions in place, Call light within reach, Chair alarm in place, Nurse notified, Left in chair      AM-PAC

## 2021-12-13 NOTE — PROGRESS NOTES
Speech Language Pathology  Dysphagia Treatment Note    Name:  Trevor Salamanca  :   1950  Medical Diagnosis:  Paroxysmal atrial fibrillation ([address] [I48.0]  Septicemia ([address] [A41.9]  Atrial fibrillation with rapid ventricular response ([address] [I48.91]  Sepsis due to pneumonia ([address] [J18.9, A41.9]  Pneumonia of left lower lobe due to infectious organism [J18.9]  Treatment Diagnosis: Oropharyngeal Dysphagia  Pain level: Does not report pain at this time. Diet level prior to treatment: Regular solids, Thin liquids, No straws, Meds as tolerated  Tolerance of Current Diet Level: No difficulties with current diet per chart review or per patient. Assessment of Texture Tolerance:  Impressions: Upon SLP arrival, patient alert and upright in bed, agreeable to follow-up dysphagia therapy. Three visitors present at bedside. Patient accepted trials of regular solids and thin liquids via straw to assess diet tolerance. Oral phase characterized by slow mastication, though functional, with sufficient bolus formation and propulsion. Independent use of liquid wash noted to ensure complete clearance of bolus. Trials of thin via straw revealed no overt s/s of aspiration in 5/5x trials, though slightly reduced hyolaryngeal elevation. O2 sats remained at 100% throughout PO trails. RR remained between 13-17/min with PO trials, however did reach 20/min following 1x trial of thin via straw. Recommend continuation of regular solids and thin liquids, with use of straw permitted. SLP provided education regarding recommendations and ongoing use of compensatory strategies in order to reduce risk of aspiration. Patient verbalized understanding. If respiratory status worsens and/or overt s/s of aspiration arise, downgrade diet and notify SLP immediately. Diet and Treatment Recommendations 2021:  Recommend Regular Textures with Thin Liquids/No straws, meds as tolerated    Compensatory strategies:  Alternate solids/liquids , Upright as possible with all PO intake , No straws , Small bites/sips , Eat/feed slowly, Remain upright 30-45 min     Dysphagia Goals:   Speech therapy for dysphagia tx 3-5 times per week during acute care stay. 1. Pt will functionally tolerate ongoing assessment of swallow function with diet to be determined as indicated GOAL MET  2. Pt will demonstrate understanding of aspiration risk and precautions via education/demonstration with occasional prompting (ongoing 12/13/2021)  3. If clinical s/s of aspiration/penetration continue to be noted, Pt will participate in Modified Barium Swallow Study (ongoing 12/13/2021)  4. Pt will tolerate recommended diet with no overt clinical s/s of aspiration/penetration (ongoing 12/13/2021)    Plan:  Continued daily Dysphagia treatment with goals per plan of care. Patient/Family Education:Education given to the Pt and nurse, who verbalized understanding    Discharge Recommendations:  Pt will benefit from continued skilled Speech Therapy for Dysphagia services, prior to returning home. Timed Code Treatment: 0 minutes     Total Treatment Time: 13 minutes    If patient discharges prior to next session this note will serve as a discharge summary.      Signature:   Chris Boykin M.A., [id number] [facility].[id number]  Speech-Language Pathologist

## 2021-12-13 NOTE — PROGRESS NOTES
Comprehensive Nutrition Assessment    Type and Reason for Visit:  Initial, Consult (poor po intake)    Nutrition Recommendations/Plan:   1. Chocolate Ensure Enlive TID    Nutrition Assessment:  Pt is nutritionally compromised AEB poor po intake & wt loss reported upon admission. Hx reveals 10 lb wt loss over past 3 months (5.6%). Noted pt with stage 4 cancer & has had frequent diarrhea recently s/p systemic immunotherapy & palliative radiation to spine. RN reports pt is eating very little but likes chocolate Ensure. Will add supplements to meals TID & monitor for acceptance. Malnutrition Assessment:  Malnutrition Status: Moderate malnutrition    Context:  Acute Illness     Findings of the 6 clinical characteristics of malnutrition:  Energy Intake:  7 - 50% or less of estimated energy requirements for 5 or more days  Weight Loss:  No significant weight loss (5% loss in 3 months per EMR)     Body Fat Loss:  Unable to assess     Muscle Mass Loss:  Unable to assess    Fluid Accumulation:  7 - Moderate to Severe Extremities   Strength:  Not Performed    Estimated Daily Nutrient Needs:  Energy (kcal):  7827-7678 (25-30 kcal/79 kg); Weight Used for Energy Requirements:  Current     Protein (g):  95- 119 g (1.2-1.5g/79kg); Weight Used for Protein Requirements:  Current        Fluid (ml/day):   ; Method Used for Fluid Requirements:  1 ml/kcal      Nutrition Related Findings:  +2 BLE edema; low k+ 2.8, Na 128 (expected with profuse diarrhea)      Wounds:  None       Current Nutrition Therapies:    ADULT ORAL NUTRITION SUPPLEMENT; Breakfast, Lunch, Dinner; Standard High Calorie/High Protein Oral Supplement  ADULT DIET;  Regular; 1200 ml    Anthropometric Measures:  · Height: 6' 2\" (188 cm)  · Current Body Weight: 167 lb (75.8 kg) (also noted 173 lb on same day.)   · Admission Body Weight:      · Usual Body Weight: 177 lb (80.3 kg)     · Ideal Body Weight: 190 lbs; % Ideal Body Weight 87.9 %   · BMI: 21.4  · Adjusted Body Weight:  ; No Adjustment   · BMI Categories: Normal Weight (BMI 18.5-24. 9)       Nutrition Diagnosis:   · Moderate malnutrition related to inadequate protein-energy intake as evidenced by intake 0-25%, weight loss, poor intake prior to admission      Nutrition Interventions:   Food and/or Nutrient Delivery:  Continue Current Diet, Start Oral Nutrition Supplement  Nutrition Education/Counseling:  Education not indicated   Coordination of Nutrition Care:  Continue to monitor while inpatient    Goals:  po intake at least 50% of meals       Nutrition Monitoring and Evaluation:   Behavioral-Environmental Outcomes:  None Identified   Food/Nutrient Intake Outcomes:  Food and Nutrient Intake, Supplement Intake  Physical Signs/Symptoms Outcomes:  Diarrhea, Weight     Discharge Planning:     Too soon to determine     Electronically signed by Justyn Carbajal RD, LD on 12/13/21 at 12:53 PM EST    Contact: [phone]

## 2021-12-13 NOTE — PROGRESS NOTES
Nephrology Progress Note  [phone]   [facility]. [employer]    Patient:  Brooklynn Poster   : 1950        HPI: Mr Jasper Urban is a 72 yo male with h/o renal cell carcinoma metastatic diagnosed in 2021, HTN, Gout, Atrial fibrillation on AC, Osteoarthritis came in to ER with generalized weakness, shortness of breath and diarrhea. He was found to be hypoxic and tachycardic with HRs upto 150s. Subsequently also became hypotensive to systolics of 07C.    Labs revealed SYL with a BUN/Cr of 57/8.2 and metabolic acidosis/lactic acidosis. CXR on admission showed mild cardiomegaly and mild central pulm congestion. A CT of the chest with contrast ruled out PE, but revealed a 5.1 cm infrahilar mass left which may represent enlarged conglomerate of lymph nodes, multiple enlarged right hilar lymph nodes, small left sided pleural effusion, moderate sized consolidation in the LLL concerning for an ASD. He is being treated for a possible pneumonia. In regards to his renal cell cancer, he was diagnosed in 2019, metastatic to bones and mediastinal LNs, on systemic immunotherapy consisting of iplimumab and nivolumab and has received 3 cycles so far. C3 done on 2021. Also is s/p palliative XRT to L3 spine and left pelvis from 2021 to 10/13/2021. He is currently being treated with broad-spectrum Abx for a possible pneumonia. We are consulted for SYL  Baseline creatinine is less than 1  Creatinine on admission was 1.3 and is 1.8 at this time  SYL was presumed to be secondary to volume depletion and has been on isotonic fluids.        Subjective:  -pt seen and examined  -PMSHx and meds reviewed  -family at bedside    Events noted  Cancer progression per Oncology  Having diarrhea multiple episodes    ROS:   Positive diarrhea/neg AP        Meds:  Reviewed     Vitals:  /73   Pulse 78   Temp 97.8 °F (36.6 °C) (Temporal)   Resp 15   Wt 173 lb 14.4 oz (78.9 kg)   SpO2 99%   BMI 22.33 kg/m² Physical Exam:  General : Awake, alert, oriented x3,  appears frail, ill-looking, not in pain or respiratory distress, resting in bed  HEENT : normocephalic, atraumatic, mucosa dry,  no palor or icterus  CVS: S1 S2 normal, regular rhythm, no murmurs or rubs. Lungs: decreased breath sounds at the bases  Abd: Soft, bowel sounds normal, non-tender.  Abdomen distended  Ext: LE edema + bilateral   : bladder non-distended, no tenderness over the bladder  Labs:  CBC:   Lab Results   Component Value Date    WBC 2.6 12/13/2021    RBC 3.06 12/13/2021    HGB 7.1 12/13/2021    HCT 21.7 12/13/2021    MCV 71.1 12/13/2021    MCH 23.3 12/13/2021    MCHC 32.8 12/13/2021    RDW 18.6 12/13/2021     12/13/2021    MPV 7.3 12/13/2021     BMP:    Lab Results   Component Value Date     12/13/2021    K 2.8 12/13/2021    K 3.5 12/11/2021    CL 90 12/13/2021    CO2 17 12/13/2021    BUN 57 12/13/2021    LABALBU 3.3 12/10/2021    CREATININE 3.9 12/13/2021    CALCIUM 6.1 12/13/2021    GFRAA 19 12/13/2021    GFRAA >60 03/24/2011    LABGLOM 15 12/13/2021    GLUCOSE 148 12/13/2021       Assessment/Plan:    Acute Kidney injury   Baseline creatinine less than 1  Creatinine on admission was 1.3 and is worsening  Suspect ATN, multifactorial due to volume depletion/contrast exposure   AIN due to immunotherapy is less likely given bland UA  -cr continues to rise-3/9 today  -rest of lytes stable  -no urgent KRT indicated  -continue IVF  -give LR bolus today and increase maintenance IVF rate  -recheck lytes later today    Acidosis metabolic  -due to diarrhea  continue sodium bicarbonate IV  Recheck lytes at 4pm-plan to d/c bicarbonate drip if acidosis improved     Hypokalemia  Due to diarrhea and poor po intake  Give additional kcl 40 meq po x1     Hyponatremia   Hypovolemic-continue IVF as above     Hypomagnesemia  Repleted     Pneumonia  Suspecting post obstructive  On ABx     Renal cell cancer left  Metastatic  On immunotherapy-now off-per oncology, plan for TK inhibitors or VEGF I     Atrial fibrillation with RVR  On Apixaban and amiodarone         Josey Morgan MD

## 2021-12-14 NOTE — PLAN OF CARE
Problem: Falls - Risk of:  Goal: Will remain free from falls  Description: Will remain free from falls  Outcome: Ongoing     Problem: Falls - Risk of:  Goal: Absence of physical injury  Description: Absence of physical injury  Outcome: Ongoing     Problem: Safety:  Goal: Free from accidental physical injury  Description: Free from accidental physical injury  Outcome: Ongoing     Problem: Safety:  Goal: Free from intentional harm  Description: Free from intentional harm  Outcome: Ongoing     Problem: Safety:  Goal: Ability to remain free from injury will improve  Description: Ability to remain free from injury will improve  Outcome: Ongoing     Problem: ABCDS Injury Assessment  Goal: Absence of physical injury  Outcome: Ongoing   Patient educated on the importance of calling out before exiting the bed and always waiting for assistance. Bed in lowest position with wheels locked. Alarm activated. 3/4 side rails up. Non-skid socks on. Call light within reach. Hourly checks. All requested needs provided. Problem: Pain:  Goal: Patient's pain/discomfort is manageable  Description: Patient's pain/discomfort is manageable  Outcome: Ongoing     Problem: Pain:  Goal: Pain level will decrease  Description: Pain level will decrease  Outcome: Ongoing     Problem: Pain:  Goal: Control of acute pain  Description: Control of acute pain  Outcome: Ongoing     Problem: Pain:  Goal: Control of chronic pain  Description: Control of chronic pain  Outcome: Ongoing  Scheduled and PRN pain medication ordered.         Problem: Nutrition  Goal: Optimal nutrition therapy  12/14/2021 0106 by Estevan Madrigal RN  Outcome: Ongoing     Problem: Infection:  Goal: Will remain free from infection  Description: Will remain free from infection  Outcome: Ongoing

## 2021-12-14 NOTE — PROGRESS NOTES
Nephrology Progress Note  [phone]  [phone]   [facility]. [employer]    Patient:  Dami Barry   : 1950        HPI: Mr Tamie Rao is a 74 yo male with h/o renal cell carcinoma metastatic diagnosed in 2021, HTN, Gout, Atrial fibrillation on AC, Osteoarthritis came in to ER with generalized weakness, shortness of breath and diarrhea. He was found to be hypoxic and tachycardic with HRs upto 150s. Subsequently also became hypotensive to systolics of 09J.    Labs revealed SYL with a BUN/Cr of 45/6.2 and metabolic acidosis/lactic acidosis. CXR on admission showed mild cardiomegaly and mild central pulm congestion. A CT of the chest with contrast ruled out PE, but revealed a 5.1 cm infrahilar mass left which may represent enlarged conglomerate of lymph nodes, multiple enlarged right hilar lymph nodes, small left sided pleural effusion, moderate sized consolidation in the LLL concerning for an ASD. He is being treated for a possible pneumonia. In regards to his renal cell cancer, he was diagnosed in 2019, metastatic to bones and mediastinal LNs, on systemic immunotherapy consisting of iplimumab and nivolumab and has received 3 cycles so far. C3 done on 2021. Also is s/p palliative XRT to L3 spine and left pelvis from 2021 to 10/13/2021. He is currently being treated with broad-spectrum Abx for a possible pneumonia. We are consulted for SYL  Baseline creatinine is less than 1  Creatinine on admission was 1.3 and is 1.8 at this time  SYL was presumed to be secondary to volume depletion and has been on isotonic fluids.        Subjective:  -pt seen and examined  -PMSHx and meds reviewed  -family at bedside    Events noted  Continues to have diarrhea  Calcium los, getting replaced  Anuric now  BP relatively low    ROS:   Positive diarrhea/neg AP        Meds:  Reviewed     Vitals:  BP 98/74   Pulse 81   Temp 97.1 °F (36.2 °C) (Temporal)   Resp 13   Ht 6' 2\" (1.88 m)   Wt 174 lb 14.4 oz (79.3 kg) SpO2 99%   BMI 22.46 kg/m²     Physical Exam:  General : Awake, alert, oriented x3,  appears frail, ill-looking, not in pain or respiratory distress, resting in bed  HEENT : normocephalic, atraumatic, mucosa dry,  no palor or icterus  CVS: S1 S2 normal, regular rhythm, no murmurs or rubs. Lungs: decreased breath sounds at the bases  Abd: Soft, bowel sounds normal, non-tender.  Abdomen distended  Ext: LE edema + bilateral     Labs:  CBC:   Lab Results   Component Value Date    WBC 3.7 12/14/2021    RBC 3.16 12/14/2021    HGB 7.4 12/14/2021    HCT 23.1 12/14/2021    MCV 72.9 12/14/2021    MCH 23.3 12/14/2021    MCHC 31.9 12/14/2021    RDW 18.7 12/14/2021     12/14/2021    MPV 7.1 12/14/2021     BMP:    Lab Results   Component Value Date     12/14/2021    K 3.3 12/14/2021    K 3.5 12/11/2021    CL 93 12/14/2021    CO2 15 12/14/2021    BUN 60 12/14/2021    LABALBU 3.3 12/10/2021    CREATININE 3.7 12/14/2021    CALCIUM 5.5 12/14/2021    GFRAA 20 12/14/2021    GFRAA >60 03/24/2011    LABGLOM 16 12/14/2021    GLUCOSE 165 12/14/2021       Assessment/Plan:    Acute Kidney injury   Baseline creatinine less than 1  Creatinine on admission was 1.3 and is worsening  Suspect ATN, multifactorial due to volume depletion/contrast exposure   AIN due to immunotherapy is less likely given bland UA  -Cr is stable, actual GFR is lower give poor muscle mass  -no urgent KRT indicated-given advanced cancer, dialysis would be futile if and when indicated if not a candidate for chemotherapy  -recommend palliative care to discuss goals of care  -continue sodium bicarbonate drip-increase rate for now   -consider CT of A/P to r/o HN, last CT on 12/10 was negative  -give albumin  -check uric acid and LDH-r/o TLS  -start pressors for MAP < 65 pending discussions regarding goals of care  -recheck lytes later today    Acidosis metabolic  -due to diarrhea  -increase rate of sodium bicarbonate    Hypocalcemia: due to SYL-getting replaced  -will start Tums and ergocalciferol  -PTH/vitamin D ordered  -check phos     Hypokalemia  Due to diarrhea and poor po intake  -replace  -recheck     Hyponatremia   Hypovolemic-continue IVF as above    Hypomagnesemia  Repleted     Pneumonia  Suspecting post obstructive  On ABx     Renal cell cancer left  Metastatic  On immunotherapy-now off     Atrial fibrillation with RVR  On Apixaban and amiodarone    D/w daughter over phone    Radha Barclay MD

## 2021-12-14 NOTE — PROGRESS NOTES
Events noted. Discussed with Dr. Neyda Casillas earlier. Spoke to patient's daughter Brad [address] over the phone. The patient has progressive renal cell carcinoma through first-line of treatment with combination immunotherapy. He has SYL. He has pneumonia  He has diarrhea which is most likely due to immunotherapy. Given all these issues, family has decided on best supportive care/hospice. I concur with their decision.     Ronnie Arteaga MD

## 2021-12-14 NOTE — PROGRESS NOTES
Speech Language Pathology  Attempt Note    Bren Story  1950    SLP attempted to see pt this date for dysphagia intervention. Per chart review, pt and family deciding between [facility] or home with Hospice. Family at bedside, pt and family deny concerns with swallowing and report good tolerance of diet. Pt currently on a regular textured diet with thin liquids. Therefore, will discharge from speech service at this time. Please re-consult as clinically indicated.     Kranthi Eli, [address] [facility]  Speech-Language Pathologist  [phone]

## 2021-12-14 NOTE — PROGRESS NOTES
Hospitalist Progress Note      PCP: Haley Caal MD    Date of Admission: 12/10/2021    Chief Complaint: SOB     Hospital Course: This 73 y. o. male with history of paroxysmal atrial fibrillation (on chronic anticoagulation with Eliquis), history of renal cancer, osteoarthritis and essential hypertension presented with with complaints of shortness of breath, recent diarrhea, generalized weakness.  Patient was also noted to be hypoxic with oxygen saturation in the 80s.  He was tachycardic with heart rate in the 150s.  On presentation to the emergency room, lactic acid is elevated, chest x-ray concerning for pneumonia       Subjective: Feeling weak overall. On heparin drip and IV fluids with bicarb. Coughing at times.       Medications:  Reviewed    Infusion Medications    heparin (PORCINE) Infusion 22 Units/kg/hr (12/14/21 0600)    sodium bicarbonate infusion 150 mL/hr at 12/14/21 1037    lactated ringers Stopped (12/14/21 0513)    sodium chloride Stopped (12/13/21 0903)    amiodarone Stopped (12/11/21 1423)     Scheduled Medications    calcium gluconate-NaCl  1,000 mg IntraVENous Once    calcium carbonate  500 mg Oral BID    vitamin D  50,000 Units Oral Weekly    albumin human  25 g IntraVENous Once    predniSONE  20 mg Oral Daily    atorvastatin  40 mg Oral Nightly    megestrol  40 mg Oral Daily    sodium chloride flush  5-40 mL IntraVENous 2 times per day    piperacillin-tazobactam  3,375 mg IntraVENous Q12H    morphine  15 mg Oral 2 times per day    ipratropium-albuterol  1 ampule Inhalation 4x daily     PRN Meds: heparin (porcine), heparin (porcine), diphenoxylate-atropine, oxyCODONE, sodium chloride flush, sodium chloride, acetaminophen **OR** acetaminophen, LORazepam, albuterol      Intake/Output Summary (Last 24 hours) at 12/14/2021 1134  Last data filed at 12/14/2021 0600  Gross per 24 hour   Intake 3057.52 ml   Output 240 ml   Net 2817.52 ml       Physical Exam Performed:    BP 98/74   Pulse 81   Temp 97.1 °F (36.2 °C) (Temporal)   Resp 13   Ht 6' 2\" (1.88 m)   Wt 174 lb 14.4 oz (79.3 kg)   SpO2 99%   BMI 22.46 kg/m²     General appearance: No apparent distress, appears stated age and cooperative. HEENT: Pupils equal, round, and reactive to light. Conjunctivae/corneas clear. Neck: Supple, with full range of motion. No jugular venous distention. Trachea midline. Respiratory:  Normal respiratory effort. Clear to auscultation, bilaterally without Rales/Wheezes/Rhonchi. Cardiovascular: Regular rate and rhythm with normal S1/S2 without murmurs, rubs or gallops. Abdomen: Soft, non-tender, non-distended with normal bowel sounds. Musculoskeletal: No clubbing, cyanosis or edema bilaterally. Full range of motion without deformity. Skin: Skin color, texture, turgor normal.  No rashes or lesions. Neurologic:  Neurovascularly intact without any focal sensory/motor deficits. Cranial nerves: II-XII intact, grossly non-focal.  Psychiatric: Alert and oriented, thought content appropriate, normal insight  Capillary Refill: Brisk,3 seconds, normal   Peripheral Pulses: +2 palpable, equal bilaterally       Labs:   Recent Labs     12/12/21  0440 12/13/21  0632 12/14/21  0255   WBC 4.5 2.6* 3.7*   HGB 7.9* 7.1* 7.4*   HCT 24.1* 21.7* 23.1*    266 266     Recent Labs     12/13/21  0632 12/13/21  1430 12/14/21  0255   * 127* 130*   K 2.8* 3.6 3.3*   CL 90* 90* 93*   CO2 17* 16* 15*   BUN 57* 60* 60*   CREATININE 3.9* 3.9* 3.7*   CALCIUM 6.1* 5.9* 5.5*   PHOS  --   --  6.1*     No results for input(s): AST, ALT, BILIDIR, BILITOT, ALKPHOS in the last 72 hours. No results for input(s): INR in the last 72 hours. No results for input(s): Katsarah Dumont in the last 72 hours.     Urinalysis:      Lab Results   Component Value Date    NITRU Negative 12/12/2021    WBCUA 16 12/12/2021    RBCUA 3 12/12/2021    BLOODU SMALL 12/12/2021    SPECGRAV 1.021 12/12/2021    GLUCOSEU Negative 12/12/2021    GLUCOSEU NEGATIVE 03/18/2011       Radiology:  CT ABDOMEN PELVIS W IV CONTRAST Additional Contrast? None   Final Result   1. Interval enlargement left renal mass, which is now indistinguishable from   the pancreatic tail, concerning for underlying disease involvement. 2. Pathologic compression fractures of the L4 and L5 vertebral bodies. Suspected pathologic fracture of the L1 vertebral body. Additional lytic   lesions within the visualized osseous structures, compatible with metastatic   disease. 3. Subcentimeter hypoattenuating hepatic lesions, indeterminate. However,   given findings elsewhere, this is concerning for hepatic metastatic disease. 4. Interval progression of partially visualized intrathoracic disease with   postobstructive atelectasis versus pneumonia. 5. Grossly stable left adrenal mass. CT CHEST PULMONARY EMBOLISM W CONTRAST   Final Result   No evidence of pulmonary embolism. There is a 5.1 cm left infrahilar mass which may represent an enlarged   conglomerate of lymph nodes. Multiple enlarged right hilar lymph nodes are   present. Small left-sided pleural effusion. Moderate size area of   consolidation in the left lower lobe, concerning for an airspace disease   process. XR CHEST PORTABLE   Final Result   Mild cardiomegaly and mild central pulmonary congestion or pulmonary artery   hypertension which is more prominent. Hazy left basilar atelectasis or infiltrate which is more prominent. No change in left Port-A-Cath. Assessment/Plan:    Active Hospital Problems    Diagnosis     Moderate malnutrition ([address] [E44.0]     Sepsis due to pneumonia ([address] [J18.9, A41.9]      Left lower lobe postobstructive pneumonia: PE ruled out but CT scan showed 1.5 cm left infrahilar mass and right hilar lymph nodes and small left-sided pleural effusion and consolidation in the left lower lobe. Pulmonology on board. On antibiotics.     Sepsis secondary pneumonia: Resolved. Hypoxia speech failure: Secondary to pneumonia resolved. History of metastatic kidney cancer with mets to bone and mediastinal lymph nodes: Oncology on board CT showed progression of the disease. Poor prognosis overall. Oncology discussion with family noted. No chemotherapy at this time. Atrial fibrillation with RVR: Treated with IV Cardizem. Currently in sinus. Eliquis on hold. On heparin drip. Nonzero troponin: Secondary with RVR. Acute renal failure/metabolic acidosis. : Worsening creatinine. Discussed with nephrology. On IV fluids     Diarrhea    -Overall worsening  renal function and renal cancer. Poor prognosis overall. Discussed with nephrology and palliative care. CODE STATUS changed to DNR CC. Patient and family leaning towards home with hospice. DVT Prophylaxis: Heparin drip  Diet: ADULT ORAL NUTRITION SUPPLEMENT; Breakfast, Lunch, Dinner; Standard High Calorie/High Protein Oral Supplement  ADULT DIET; Regular; 1200 ml  Code Status: Full Code    PT/OT Eval Status: Not now    Dispo -inpatient 1-2 more days.     Roxana Lozano MD

## 2021-12-14 NOTE — FLOWSHEET NOTE
12/14/21 4894   Provider Notification   Reason for Communication Evaluate   Provider Name Kimberly Amaya   Provider Notification Physician   Method of Communication Secure Message   Response Waiting for response   Notification Time 0917   K 3.3 this morning. Do you want to replace today?

## 2021-12-14 NOTE — CONSULTS
Palliative Care: 73y.o. year old male with a past medical history significant for metastatic renal cancer, hypertension, gout who presented to the hospital with shortness of breath and tachycardia. Patient is known to have stage IV kidney disease with metastasis to bones, mediastinal lymph nodes, liver. He has been on systemic immunotherapy with most recent therapy given on 12/2/121. He also received palliative radiation to L3 spine as well as left pelvis. Chest imaging done at the time of admission showed left lung airspace disease. Patient was noted to have hypotension as well as lactic acidosis. Also having profuse diarrhea. Pulmonary has been consulted to distinguish between pneumonia versus pneumonitis. Patient admitted 12/10/21 and Palliative consult placed 12/11/21. Past Medical History:   has a past medical history of Arthritis, Cancer ([address] De Quervain's tenosynovitis, Diverticulitis, Gout, and HTN (hypertension). Past Surgical History:   has a past surgical history that includes hip surgery; Liver surgery; hernia repair; hip surgery (3/22/11); CT BIOPSY DEEP BONE PERCUTANEOUS (9/13/2021); and Port Surgery (Left, 11/8/2021). Advance Directives:   Full Code. No ACP in place. Problem Severity: Pain/Other Symptoms:  metastatic renal cancer. Rates pain @ 5. Bed Mobility/Toileting/Transfer:  Stand by assistance. Severe diarrhea since admit. Low potassium. Performance Status:        Palliative Performance Scale:  100% []Full Normal activity & work No evidence of disease  90%   [] Full Normal activity & work Some evidence of disease  80%   [] Full Normal activity with Effort Some evidence of disease  70%   [] Reduced Unable Normal Job/Work Significant disease Full Normal or reduced  60%   [] Ambulation reduced; Significant disease; Can't do hobbies/housework; intake normal   or reduced; occasional assist; LOC full/confusion  50%   [] Mainly sit/lie;  Extensive disease; Can't do any work; Considerable assist; intake normal  Or reduced; LOC full/confusion  40%   [x] Mainly in bed; Extensive disease; Mainly assist; intake normal or reduced; occasional assist; LOC full/confusion  30%   [] Bed Bound; Extensive disease; Total care; intake reduced; LOC full/confusion  20%   [] Bed Bound; Extensive disease; Total care; intake minimal; Drowsy/coma  10%   [] Bed Bound; Extensive disease; Total care; Mouth care only; Drowsy/coma    PPS 40%    Symptom Assessment: Appetite/Nausea/Bowels/Fatigue:    lbs. Albumin 3.3      Social History:   reports that he has quit smoking. His smoking use included cigars. He quit after 20.00 years of use. He has never used smokeless tobacco. He reports that he does not drink alcohol and does not use drugs. Family History:  family history includes Cancer in his mother; Heart Disease in his maternal grandmother. Psychological/Spiritual:     . Two children. Yazidism. Family Discussion:     Patient A/O X3. Able to express all needs. Spouse and two children at bedside they are all discussing Hospice care after talking to Nephrology today. They are deciding Hospice at home or Hosp[facility] at [facility]. Specifics of an end-of-life/comfort-focused treatment plan were discussed with the family, including (but not limited to) discontinuation of labs, non-palliative medications, and routine vitals, along with the benefits of hospice enrollment. Family asked multiple appropriate questions, all of which were addressed. Family is discussing transitioning to intensive comfort treatment. Referral called to [facility] pt and family deciding between [facility] or home with Hospice.  Paola Donnelly RN from [facility] will follow-up in am.     60 mins spent on case greater than 50% in direct face to face contact with patient, family and care team.

## 2021-12-14 NOTE — PROGRESS NOTES
[facility] or Facility: [facility]   From: Shine Bhatia   RE: Jesika Montano 1950 RM: [id number]     Patient admitted for hypoxia, Afib RVR, and metastatic renal cancer with mets to the bone and lung. He was having severe diarrhea that has slowed down. On 100 mL of LR and 100 mL of sodium bicarb. Patient is wheezy and not responding to nebulizer treatment. C/o painful cough in chest. Urine output is not adequate. LR stopped d/t possible fluid overload. FYI.

## 2021-12-14 NOTE — PROGRESS NOTES
12/13/2021    SEGSPCT 77.8 03/24/2011    LYMPHOPCT 15.7 12/13/2021    MONOPCT 11.8 12/13/2021    EOSPCT 0.7 03/24/2011    BASOPCT 0.1 12/13/2021    MONOSABS 0.3 12/13/2021    LYMPHSABS 0.4 12/13/2021    EOSABS 0.0 12/13/2021    BASOSABS 0.0 12/13/2021    DIFFTYPE Auto 03/24/2011     BMP:    Lab Results   Component Value Date     12/13/2021    K 3.6 12/13/2021    K 3.5 12/11/2021    CL 90 12/13/2021    CO2 16 12/13/2021    BUN 60 12/13/2021    LABALBU 3.3 12/10/2021    CREATININE 3.9 12/13/2021    CALCIUM 5.9 12/13/2021    GFRAA 19 12/13/2021    GFRAA >60 03/24/2011    LABGLOM 15 12/13/2021    GLUCOSE 134 12/13/2021     Hepatic Function Panel:    Lab Results   Component Value Date    ALKPHOS 69 12/10/2021    ALT 8 12/10/2021    AST 7 12/10/2021    PROT 7.7 12/10/2021    PROT 5.8 03/24/2011    BILITOT 0.6 12/10/2021    BILIDIR <0.2 10/07/2021    IBILI see below 10/07/2021    LABALBU 3.3 12/10/2021     LDH:    Lab Results   Component Value Date     09/12/2021     PT/INR:    Lab Results   Component Value Date    PROTIME 20.9 12/10/2021    INR 1.80 12/10/2021     PTT:    Lab Results   Component Value Date    APTT 33.0 12/13/2021   [APTT    Current Medications  Current Facility-Administered Medications: heparin (porcine) injection 6,310 Units, 80 Units/kg, IntraVENous, PRN  heparin (porcine) injection 3,160 Units, 40 Units/kg, IntraVENous, PRN  heparin 25,000 units in dextrose 5% 250 mL (premix) infusion, 5-30 Units/kg/hr, IntraVENous, Continuous  sodium bicarbonate 150 mEq in dextrose 5 % 1,000 mL infusion, , IntraVENous, Continuous  lactated ringers infusion, , IntraVENous, Continuous  [START ON 12/14/2021] predniSONE (DELTASONE) tablet 20 mg, 20 mg, Oral, Daily  diphenoxylate-atropine (LOMOTIL) 2.5-0.025 MG per tablet 2 tablet, 2 tablet, Oral, 4x Daily PRN  atorvastatin (LIPITOR) tablet 40 mg, 40 mg, Oral, Nightly  megestrol (MEGACE) tablet 40 mg, 40 mg, Oral, Daily  oxyCODONE (ROXICODONE) immediate release tablet 5 mg, 5 mg, Oral, Q4H PRN  sodium chloride flush 0.9 % injection 5-40 mL, 5-40 mL, IntraVENous, 2 times per day  sodium chloride flush 0.9 % injection 5-40 mL, 5-40 mL, IntraVENous, PRN  0.9 % sodium chloride infusion, 25 mL, IntraVENous, PRN  acetaminophen (TYLENOL) tablet 650 mg, 650 mg, Oral, Q6H PRN **OR** acetaminophen (TYLENOL) suppository 650 mg, 650 mg, Rectal, Q6H PRN  LORazepam (ATIVAN) tablet 0.5 mg, 0.5 mg, Oral, Q4H PRN  piperacillin-tazobactam (ZOSYN) 3,375 mg in dextrose 5 % 50 mL IVPB extended infusion (mini-bag), 3,375 mg, IntraVENous, Q12H  morphine (MS CONTIN) extended release tablet 15 mg, 15 mg, Oral, 2 times per day  norepinephrine (LEVOPHED) 16 mg in sodium chloride 0.9 % 250 mL infusion, 2-100 mcg/min, IntraVENous, Continuous  ipratropium-albuterol (DUONEB) nebulizer solution 1 ampule, 1 ampule, Inhalation, 4x daily  albuterol (PROVENTIL) nebulizer solution 2.5 mg, 2.5 mg, Nebulization, Q4H PRN  [COMPLETED] amiodarone (CORDARONE) 150 mg in dextrose 5 % 100 mL bolus, 150 mg, IntraVENous, Once **FOLLOWED BY** [] amiodarone (CORDARONE) 450 mg in dextrose 5 % 250 mL infusion, 1 mg/min, IntraVENous, Continuous **FOLLOWED BY** amiodarone (CORDARONE) 450 mg in dextrose 5 % 250 mL infusion, 0.5 mg/min, IntraVENous, Continuous    ASSESSMENT AND PLAN    72-year-old gentleman has    1. Metastatic kidney cancer to the bones and mediastinal lymph nodes:    -Diagnosed in 2021  -See above for treatments received. -Based on the imaging studies performed on 12/10/2021, he appears to have progressive disease.     - Spoke to the patient as well as patient's sisters & son.  -Winston Ra him about progression of the disease.  -Further treatment options would be second line of treatment for metastatic renal cell carcinoma with tyrosine kinase inhibitor or veg F inhibitor.  -Best supportive care option was discussed with the son in private at his request.  - requested next generation sequencing on the pathology specimen from September 2021.      2.  Respiratory failure, hypoxia:    -He is on antibiotics for pneumonia  - Pulmonary consult appreciated. -Continue antibiotics    3. Diarrhea:    -C. difficile negative  -Probably due to checkpoint inhibitors  -He is on hydrocortisone. 3.  Anemia:    -Anemia of chronic disease  -Transfuse PRBCs if hemoglobin is less than 7    4. Neoplasm related pain:    -MS Contin and oxycodone    5. A. fib with RVR:    -Evaluated by cardiology  -Started anticoagulation and amiodarone    6.  SYL:    -Creatinine is 3.2  -Nephrology is following      Quoc Lion MD

## 2021-12-15 NOTE — PROGRESS NOTES
Palliative Care: 73y.o. year old male with a past medical history significant for metastatic renal cancer, hypertension, gout who presented to the hospital with shortness of breath and tachycardia. Patient is known to have stage IV kidney disease with metastasis to bones, mediastinal lymph nodes, liver. He has been on systemic immunotherapy with most recent therapy given on 12/2/121. He also received palliative radiation to L3 spine as well as left pelvis. Chest imaging done at the time of admission showed left lung airspace disease. Patient was noted to have hypotension as well as lactic acidosis. Also having profuse diarrhea. Pulmonary has been consulted to distinguish between pneumonia versus pneumonitis. Patient admitted 12/10/21 and Palliative consult placed 12/11/21. Past Medical History:   has a past medical history of Arthritis, Cancer ([address] De Quervain's tenosynovitis, Diverticulitis, Gout, and HTN (hypertension). Past Surgical History:   has a past surgical history that includes hip surgery; Liver surgery; hernia repair; hip surgery (3/22/11); CT BIOPSY DEEP BONE PERCUTANEOUS (9/13/2021); and Port Surgery (Left, 11/8/2021). Advance Directives:   Full Code. No ACP in place. Problem Severity: Pain/Other Symptoms:  metastatic renal cancer. Rates pain @ 5. Bed Mobility/Toileting/Transfer:  Stand by assistance. Severe diarrhea since admit. Low potassium. Performance Status:        Palliative Performance Scale:  100% []Full Normal activity & work No evidence of disease  90%   [] Full Normal activity & work Some evidence of disease  80%   [] Full Normal activity with Effort Some evidence of disease  70%   [] Reduced Unable Normal Job/Work Significant disease Full Normal or reduced  60%   [] Ambulation reduced; Significant disease; Can't do hobbies/housework; intake normal   or reduced; occasional assist; LOC full/confusion  50%   [] Mainly sit/lie;  Extensive disease; Can't do any work; Considerable assist; intake normal  Or reduced; LOC full/confusion  40%   [x] Mainly in bed; Extensive disease; Mainly assist; intake normal or reduced; occasional assist; LOC full/confusion  30%   [] Bed Bound; Extensive disease; Total care; intake reduced; LOC full/confusion  20%   [] Bed Bound; Extensive disease; Total care; intake minimal; Drowsy/coma  10%   [] Bed Bound; Extensive disease; Total care; Mouth care only; Drowsy/coma    PPS 40%    Symptom Assessment: Appetite/Nausea/Bowels/Fatigue:    lbs. Albumin 3.3      Social History:   reports that he has quit smoking. His smoking use included cigars. He quit after 20.00 years of use. He has never used smokeless tobacco. He reports that he does not drink alcohol and does not use drugs. Family History:  family history includes Cancer in his mother; Heart Disease in his maternal grandmother. Psychological/Spiritual:     . Two children. [employer]ism. Family Discussion:     Patient A/O X3. Able to express all needs. Spouse and two children at bedside they are all discussing Hospice care after talking to Nephrology today. They are deciding Hospice at home or [facility] at [facility]. Specifics of an end-of-life/comfort-focused treatment plan were discussed with the family, including (but not limited to) discontinuation of labs, non-palliative medications, and routine vitals, along with the benefits of hospice enrollment. Family asked multiple appropriate questions, all of which were addressed. Family is discussing transitioning to intensive comfort treatment. Plan to tranfers home today with Delta [place]ña RN liaison at bedside.      35 mins spent on case greater than 50% in direct face to face contact with patient, family and care team.

## 2021-12-15 NOTE — PROGRESS NOTES
Events noted. Patient is family by bedside. Patient and family have decided on hospice which is appropriate.   They are hoping to take patient home with hospice care this afternoon    Keena Cooper MD

## 2021-12-15 NOTE — PROGRESS NOTES
PT alert and hard of hearing on room air. PT and family educated by staff extensively on hospice process and what the next steps moving forward will look like. Family is very concerned with the possibility pt could die tonight. Education proved on current condition. Pt has constant visitors per his end of life wishes. Ativan and pain medication given to keep pt comfortable. Family appears accepting of terminal diagnosis but wish pt to die at home, comfortable, surrounded by as many loved ones as possible.

## 2021-12-15 NOTE — PROGRESS NOTES
Pt resting well through the night with stable VS. PT and family refusing respertory care and labs on the grounds that it will not change course of care and make pt uncomfortable. Every measure possible taken to keep pt comfortable/rested this evening.

## 2021-12-15 NOTE — PROGRESS NOTES
Nephrology Progress Note  [phone]  [phone]   [facility] [employer]    Patient:  Suszanne Schlatter   : 1950        HPI: Mr Matthew Abbott is a 74 yo male with h/o renal cell carcinoma metastatic diagnosed in 2021, HTN, Gout, Atrial fibrillation on AC, Osteoarthritis came in to ER with generalized weakness, shortness of breath and diarrhea. He was found to be hypoxic and tachycardic with HRs upto 150s. Subsequently also became hypotensive to systolics of 61O.    Labs revealed SYL with a BUN/Cr of 95/8.8 and metabolic acidosis/lactic acidosis. CXR on admission showed mild cardiomegaly and mild central pulm congestion. A CT of the chest with contrast ruled out PE, but revealed a 5.1 cm infrahilar mass left which may represent enlarged conglomerate of lymph nodes, multiple enlarged right hilar lymph nodes, small left sided pleural effusion, moderate sized consolidation in the LLL concerning for an ASD. He is being treated for a possible pneumonia. In regards to his renal cell cancer, he was diagnosed in 2019, metastatic to bones and mediastinal LNs, on systemic immunotherapy consisting of iplimumab and nivolumab and has received 3 cycles so far. C3 done on 2021. Also is s/p palliative XRT to L3 spine and left pelvis from 2021 to 10/13/2021. He is currently being treated with broad-spectrum Abx for a possible pneumonia. We are consulted for SYL  Baseline creatinine is less than 1  Creatinine on admission was 1.3 and is 1.8 at this time  SYL was presumed to be secondary to volume depletion and has been on isotonic fluids.        Subjective:  -pt seen and examined  -PMSHx and meds reviewed  -family at bedside    Events noted  Plan noted for hospic  Wants to go home        Meds:  Reviewed     Vitals:  /75   Pulse 80   Temp 97.9 °F (36.6 °C) (Temporal)   Resp 10   Ht 6' 2\" (1.88 m)   Wt 189 lb 2.5 oz (85.8 kg)   SpO2 90%   BMI 24.29 kg/m²     Physical Exam:  General : Awake, alert, oriented x3,  appears frail, ill-looking, not in pain or respiratory distress, resting in bed  HEENT : normocephalic, atraumatic, mucosa dry,  no palor or icterus  CVS: S1 S2 normal, regular rhythm, no murmurs or rubs. Lungs: decreased breath sounds at the bases  Abd: Soft, bowel sounds normal, non-tender. Abdomen distended  Ext: LE edema + bilateral     Labs:  CBC:   Lab Results   Component Value Date    WBC 3.7 12/14/2021    RBC 3.16 12/14/2021    HGB 7.4 12/14/2021    HCT 23.1 12/14/2021    MCV 72.9 12/14/2021    MCH 23.3 12/14/2021    MCHC 31.9 12/14/2021    RDW 18.7 12/14/2021     12/14/2021    MPV 7.1 12/14/2021     BMP:    Lab Results   Component Value Date     12/14/2021    K 2.9 12/14/2021    K 3.5 12/11/2021    CL 83 12/14/2021    CO2 21 12/14/2021    BUN 59 12/14/2021    LABALBU 3.3 12/10/2021    CREATININE 3.8 12/14/2021    CALCIUM 5.7 12/14/2021    GFRAA 19 12/14/2021    GFRAA >60 03/24/2011    LABGLOM 16 12/14/2021    GLUCOSE 145 12/14/2021       Assessment/Plan:    Acute Kidney injury   Baseline creatinine less than 1  Creatinine on admission was 1.3 and is worsening  Suspect ATN, multifactorial due to volume depletion/contrast exposure   AIN due to immunotherapy is less likely given bland UA  -no change in clinical status-family refused abs  -plan noted for hospice  -nephrology will sign off.   Please call with with questions    Hypokalemia: due to diarrhea-replace prior to discharge    Acidosis metabolic  -due to diarrhea  -improved with IV bicarb    Hypocalcemia: due to SYL-getting replaced  -will start Tums and ergocalciferol  -PTH/vitamin D ordered  -check phos     Hypokalemia  due to diarrhea and poor po intake  -replace    Hypomagnesemia  Repleted     Pneumonia  Suspecting post obstructive  On ABx     Renal cell cancer left  Metastatic  On immunotherapy-now off  Plan for hospice     Atrial fibrillation with RVR  On Apixaban and amiodarone    D/wfamily at bedside    [facility] Den[facility]ir[facility], MD

## 2021-12-15 NOTE — FLOWSHEET NOTE
12/14/21 1907   Provider Notification   Reason for Communication Evaluate   Provider Name C/Law Roberts   Provider Notification Physician   Method of Communication Secure Message   Response Waiting for response   Notification Time 1908   Pt has become DNR-CC today due to kidney cancer that is untreatable. Plan to go home with hospice tomorrow. K 2.9, Ca 5.7. Nephrology does not want to replace given situation and told to stop bicarb gtt. Can lab draws be stopped, and heparin gtt stopped along with comfort medications ordered? Pt is requesting pain medications more frequently for increasing pain throughout shift.

## 2021-12-15 NOTE — DISCHARGE INSTR - COC
Continuity of Care Form    Patient Name: Bren Story   :  1950  MRN:  [id number]    Admit date:  12/10/2021  Discharge date:  ***    Code Status Order: DNR-CC   Advance Directives:      Admitting Physician:  Angelita Barbosa MD  PCP: Marleni Ragsdale MD    Discharging Nurse: [facility] Unit/Room#: XVH-3206/6395-94  Discharging Unit Phone Number: ***    Emergency Contact:   Extended Emergency Contact Information  Primary Emergency Contact: Roman Avileswayne [address]ung [address] Phone: [phone]  Mobile Phone: [phone]  Relation: Child  Secondary Emergency Contact: Winsome Kemp  Address: [address] Phone: [phone]  Relation: Spouse    Past Surgical History:  Past Surgical History:   Procedure Laterality Date    CT BIOPSY PERCUTANEOUS DEEP BONE  2021    CT BIOPSY PERCUTANEOUS DEEP BONE 2021 Priya Almonte MD [facility] CT SCAN    HERNIA REPAIR      bilateral inguinal    HIP SURGERY      right hip replacement    HIP SURGERY  3/22/11    REMOVAL OF HARDWARE, RIGHT HIP REVISION, CEMENTED    LIVER SURGERY      abcess    PORT SURGERY Left 2021    PORT A CATHETER PLACEMENT LEFT SUBCLAVIAN performed by Shanita Ann MD at [facility] [address]       Immunization History:   Immunization History   Administered Date(s) Administered    COVID-19, ANDI Olsen, 30mcg/0.3mL 2021, 05/10/2021       Active Problems:  Patient Active Problem List   Diagnosis Code    [facility] T84.029A, Z96.649    Hip pain M25.559    Hand pain M79.643    Swelling joint, finger, hand [id number]    De Quervain's disease (tenosynovitis) M65.4    Gout M10.9    Left renal mass N28.89    PAD (peripheral artery disease) (HCC) I73.9    Stenosis of left popliteal artery (HCC) I70.202    Right popliteal artery occlusion (HCC) I70.201    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    Anemia D64.9    HTN (hypertension) I10    Renal cancer, shifts:   In: 529.3 [I.V.:291; IV Piggyback:238.4]  Out: 400 [Urine:400]    Safety Concerns:     [facility] Safety Concerns:747355910}    Impairments/Disabilities:      [facility] Impairments/Disabilities:135769065}    Nutrition Therapy:  Current Nutrition Therapy:   [facility] Diet List:705421070}    Routes of Feeding: {CHP DME Other Feedings:989264795}  Liquids: {Slp liquid thickness:29541}  Daily Fluid Restriction: {CHP DME Yes amt example:742721633}  Last Modified Barium Swallow with Video (Video Swallowing Test): {Done Not Done PXAS:829352211}    Treatments at the Time of Hospital Discharge:   Respiratory Treatments: ***  Oxygen Therapy:  {Therapy; copd oxygen:90217}  Ventilator:    { CC Vent IDKM:953501748}    Rehab Therapies: {THERAPEUTIC INTERVENTION:2901642560}  Weight Bearing Status/Restrictions: [address] [place] [address] Weight Bearin}  Other Medical Equipment (for information only, NOT a DME order):  {EQUIPMENT:927400531}  Other Treatments: ***    Patient's personal belongings (please select all that are sent with patient):  {P DME Belongings:670404804}    RN SIGNATURE:  {Esignature:198680869}    CASE MANAGEMENT/SOCIAL WORK SECTION    Inpatient Status Date: ***    Readmission Risk Assessment Score:  Readmission Risk              Risk of Unplanned Readmission:  32           Discharging to Facility/ Agency   Name:   Address:  Phone:  Fax:    Dialysis Facility (if applicable)   Name:  Address:  Dialysis Schedule:  Phone:  Fax:    / signature: {Esignature:164137255}    PHYSICIAN SECTION    Prognosis: {Prognosis:7027047269}    Condition at Discharge: [facility] Patient Condition:450062697}    Rehab Potential (if transferring to Rehab): {Prognosis:3419174499}    Recommended Labs or Other Treatments After Discharge: ***    Physician Certification: I certify the above information and transfer of Jarret Rea  is necessary for the continuing treatment of the diagnosis listed and that he requires {Admit to Appropriate Level of Care:99007} for {GREATER/LESS:301324815} 30 days.      Update Admission H&P: {CHP DME Changes in SHWTT:374831509}    PHYSICIAN SIGNATURE:  {Esignature:563052490}

## 2021-12-15 NOTE — PLAN OF CARE
Problem: Falls - Risk of:  Goal: Will remain free from falls  Description: Will remain free from falls  Outcome: Ongoing  Goal: Absence of physical injury  Description: Absence of physical injury  Outcome: Ongoing     Problem: Infection:  Goal: Will remain free from infection  Description: Will remain free from infection  Outcome: Ongoing     Problem: Safety:  Goal: Free from accidental physical injury  Description: Free from accidental physical injury  Outcome: Ongoing  Goal: Free from intentional harm  Description: Free from intentional harm  Outcome: Ongoing  Goal: Ability to remain free from injury will improve  Description: Ability to remain free from injury will improve  Outcome: Ongoing     Problem: Daily Care:  Goal: Daily care needs are met  Description: Daily care needs are met  Outcome: Ongoing     Problem: Pain:  Goal: Patient's pain/discomfort is manageable  Description: Patient's pain/discomfort is manageable  Outcome: Ongoing  Goal: Pain level will decrease  Description: Pain level will decrease  Outcome: Ongoing  Goal: Control of acute pain  Description: Control of acute pain  Outcome: Ongoing  Goal: Control of chronic pain  Description: Control of chronic pain  Outcome: Ongoing     Problem: Skin Integrity:  Goal: Skin integrity will stabilize  Description: Skin integrity will stabilize  Outcome: Ongoing     Problem: Discharge Planning:  Goal: Patients continuum of care needs are met  Description: Patients continuum of care needs are met  Outcome: Ongoing     Problem: Coping:  Goal: Ability to remain calm will improve  Description: Ability to remain calm will improve  Outcome: Ongoing  Goal: Ability to participate in care decisions during the dying process will improve  Description: Ability to participate in care decisions during the dying process will improve  Outcome: Ongoing  Goal: Family's ability to cope with current situation will improve  Description: Family's ability to cope with current situation will improve  Outcome: Ongoing     Problem: Self-Care:  Goal: Ability to participate in self-care as condition permits will improve  Description: Ability to participate in self-care as condition permits will improve  Outcome: Ongoing     Problem: ABCDS Injury Assessment  Goal: Absence of physical injury  Outcome: Ongoing     Problem: Nutrition  Goal: Optimal nutrition therapy  Outcome: Ongoing     Problem: Skin Integrity:  Goal: Will show no infection signs and symptoms  Description: Will show no infection signs and symptoms  Outcome: Ongoing  Goal: Absence of new skin breakdown  Description: Absence of new skin breakdown  Outcome: Ongoing  Goal: Risk for impaired skin integrity will decrease  Description: Risk for impaired skin integrity will decrease  Outcome: Ongoing     Problem: Health Behavior:  Goal: Identification of resources available to assist in meeting health care needs will improve  Description: Identification of resources available to assist in meeting health care needs will improve  Outcome: Ongoing     Problem: Physical Regulation:  Goal: Complications related to the disease process, condition or treatment will be avoided or minimized  Description: Complications related to the disease process, condition or treatment will be avoided or minimized  Outcome: Ongoing     Problem: Respiratory:  Goal: Verbalizations of increased ease of respirations will increase  Description: Verbalizations of increased ease of respirations will increase  Outcome: Ongoing     Problem: Role Relationship:  Goal: The number of positive interactions the caregiver has with the patient will increase  Description: The number of positive interactions the caregiver has with the patient will increase  Outcome: Ongoing     Problem: Sensory:  Goal: Expressions of feelings of enhanced comfort will increase  Description: Expressions of feelings of enhanced comfort will increase  Outcome: Ongoing

## 2021-12-15 NOTE — PROGRESS NOTES
12/15/21 0501   RT Protocol   History Pulmonary Disease 0   Respiratory pattern 0   Breath sounds 0   Cough 0   Indications for Bronchodilator Therapy None   Bronchodilator Assessment Score 0

## 2021-12-15 NOTE — PROGRESS NOTES
Spoke with nephrology Dr. Osvaldo Mancilla concerning critical labs, no plan to replace given DNR-CC. Plan to stop labs and fluids at this time for the comfort of the pt.

## 2021-12-15 NOTE — RT PROTOCOL NOTE
RT Inhaler-Nebulizer Bronchodilator Protocol Note    There is a bronchodilator order in the chart from a provider indicating to follow the RT Bronchodilator Protocol and there is an Initiate RT Inhaler-Nebulizer Bronchodilator Protocol order as well (see protocol at bottom of note). CXR Findings:  No results found. The findings from the last RT Protocol Assessment were as follows:   History Pulmonary Disease: None or smoker <15 pack years  Respiratory Pattern: Regular pattern and RR 12-20 bpm  Breath Sounds: Clear breath sounds  Cough: Strong, spontaneous, non-productive  Indication for Bronchodilator Therapy: None  Bronchodilator Assessment Score: 0    Aerosolized bronchodilator medication orders have been revised according to the RT Inhaler-Nebulizer Bronchodilator Protocol below. Respiratory Therapist to perform RT Therapy Protocol Assessment initially then follow the protocol. Repeat RT Therapy Protocol Assessment PRN for score 0-3 or on second treatment, BID, and PRN for scores above 3. No Indications - adjust the frequency to every 6 hours PRN wheezing or bronchospasm, if no treatments needed after 48 hours then discontinue using Per Protocol order mode. If indication present, adjust the RT bronchodilator orders based on the Bronchodilator Assessment Score as indicated below. Use Inhaler orders unless patient has one or more of the following: on home nebulizer, not able to hold breath for 10 seconds, is not alert and oriented, cannot activate and use MDI correctly, or respiratory rate 25 breaths per minute or more, then use the equivalent nebulizer order(s) with same Frequency and PRN reasons based on the score. If a patient is on this medication at home then do not decrease Frequency below that used at home.     0-3 - enter or revise RT bronchodilator order(s) to equivalent RT Bronchodilator order with Frequency of every 4 hours PRN for wheezing or increased work of breathing using Per

## 2021-12-15 NOTE — DISCHARGE SUMMARY
Hospital Medicine Discharge Summary    Patient ID: Teri Gan      Patient's PCP: Bruna Arreaga MD    Admit Date: 12/10/2021     Discharge Date:   12/15/2021    Admitting Physician: Chidi Barrios MD     Discharge Physician: Shelley Solis MD     Discharge Diagnoses: Active Hospital Problems    Diagnosis     Moderate malnutrition ([facility] [address] [E44.0]     Sepsis due to pneumonia ([facility] [address] [J18.9, A41.9]        The patient was seen and examined on day of discharge and this discharge summary is in conjunction with any daily progress note from day of discharge. Hospital Course: This 73 y. o. male with history of paroxysmal atrial fibrillation (on chronic anticoagulation with Eliquis), history of renal cancer, osteoarthritis and essential hypertension presented with with complaints of shortness of breath, recent diarrhea, generalized weakness.  treated as follows:     Left lower lobe postobstructive pneumonia: PE ruled out but CT scan showed 1.5 cm left infrahilar mass and right hilar lymph nodes and small left-sided pleural effusion and consolidation in the left lower lobe. treated with abx.      Sepsis secondary pneumonia: Resolved.     Acute Hypoxic resp  failure: Secondary to pneumonia. Resolved. Atrial fibrillation with RVR: Treated with IV Cardizem. on AC.      Nonzero troponin: Secondary with RVR.     History of metastatic kidney cancer with mets to bone and mediastinal lymph nodes: Oncology on board. CT showed progression of the disease. Poor prognosis overall. Oncology discussion with family noted. No chemotherapy at this time.      Acute renal failure/metabolic acidosis. : Worsening creatinine. Nephrology on board.      -Overall worsening  renal function and renal cancer with lung mets. .  Poor prognosis overall. CODE STATUS changed to DNR CC. Hospice consulted. Sent home with hospice.        Physical Exam Performed:     /75   Pulse 80   Temp 97.9 °F (36.6 °C) (Temporal)   Resp 10   Ht 6' 2\" (1.88 m)   Wt 189 lb 2.5 oz (85.8 kg)   SpO2 90%   BMI 24.29 kg/m²       General appearance:  No apparent distress, appears stated age and cooperative. HEENT:  Normal cephalic, atraumatic without obvious deformity. Pupils equal, round, and reactive to light. Extra ocular muscles intact. Conjunctivae/corneas clear. Neck: Supple, with full range of motion. No jugular venous distention. Trachea midline. Respiratory:  Normal respiratory effort. Clear to auscultation, bilaterally without Rales/Wheezes/Rhonchi. Cardiovascular:  Regular rate and rhythm with normal S1/S2 without murmurs, rubs or gallops. Abdomen: Soft, non-tender, non-distended with normal bowel sounds. Musculoskeletal:  No clubbing, cyanosis or edema bilaterally. Full range of motion without deformity. Skin: Skin color, texture, turgor normal.  No rashes or lesions. Neurologic:  Neurovascularly intact without any focal sensory/motor deficits. Cranial nerves: II-XII intact, grossly non-focal.  Psychiatric:  Alert and oriented, thought content appropriate, normal insight  Capillary Refill: Brisk,< 3 seconds   Peripheral Pulses: +2 palpable, equal bilaterally       Labs: For convenience and continuity at follow-up the following most recent labs are provided:      CBC:    Lab Results   Component Value Date    WBC 3.7 12/14/2021    HGB 7.4 12/14/2021    HCT 23.1 12/14/2021     12/14/2021       Renal:    Lab Results   Component Value Date     12/14/2021    K 2.9 12/14/2021    K 3.5 12/11/2021    CL 83 12/14/2021    CO2 21 12/14/2021    BUN 59 12/14/2021    CREATININE 3.8 12/14/2021    CALCIUM 5.7 12/14/2021    PHOS 6.1 12/14/2021         Significant Diagnostic Studies    Radiology:   CT ABDOMEN PELVIS W IV CONTRAST Additional Contrast? None   Final Result   1. Interval enlargement left renal mass, which is now indistinguishable from   the pancreatic tail, concerning for underlying disease involvement. 2. Pathologic compression fractures of the L4 and L5 vertebral bodies. Suspected pathologic fracture of the L1 vertebral body. Additional lytic   lesions within the visualized osseous structures, compatible with metastatic   disease. 3. Subcentimeter hypoattenuating hepatic lesions, indeterminate. However,   given findings elsewhere, this is concerning for hepatic metastatic disease. 4. Interval progression of partially visualized intrathoracic disease with   postobstructive atelectasis versus pneumonia. 5. Grossly stable left adrenal mass. CT CHEST PULMONARY EMBOLISM W CONTRAST   Final Result   No evidence of pulmonary embolism. There is a 5.1 cm left infrahilar mass which may represent an enlarged   conglomerate of lymph nodes. Multiple enlarged right hilar lymph nodes are   present. Small left-sided pleural effusion. Moderate size area of   consolidation in the left lower lobe, concerning for an airspace disease   process. XR CHEST PORTABLE   Final Result   Mild cardiomegaly and mild central pulmonary congestion or pulmonary artery   hypertension which is more prominent. Hazy left basilar atelectasis or infiltrate which is more prominent. No change in left Port-A-Cath. Consults:     IP CONSULT TO HOSPITALIST  IP CONSULT TO CARDIOLOGY  IP CONSULT TO ONCOLOGY  IP CONSULT TO PALLIATIVE CARE  IP CONSULT TO DIETITIAN  IP CONSULT TO PULMONOLOGY  IP CONSULT TO NEPHROLOGY  IP CONSULT TO SPIRITUAL SERVICES  IP CONSULT TO HOSPICE    Disposition:  Home with hospice     Condition at Discharge: Stable    Discharge Instructions/Follow-up:  none    Code Status:  DNR-CC       Activity: activity as tolerated    Diet: regular diet      Discharge Medications:     Current Discharge Medication List           Details   LORazepam (ATIVAN) 1 MG tablet Take 1 tablet by mouth every 8 hours as needed for Anxiety for up to 7 days.   Qty: 10 tablet, Refills: 0    Associated Diagnoses: Renal cancer, left (HCC)      naloxone 4 MG/0.1ML LIQD nasal spray 1 spray by Nasal route as needed for Opioid Reversal  Qty: 1 each, Refills: 5      cefUROXime (CEFTIN) 500 MG tablet Take 1 tablet by mouth daily for 7 days  Qty: 7 tablet, Refills: 0      Morphine Sulfate (MORPHINE 20MG/ML) SOLN concentrated solution Take 0.25 mLs by mouth every 4 hours as needed for Pain for up to 14 days. Qty: 15 mL, Refills: 0    Comments: Reduce doses taken as pain becomes manageable  Associated Diagnoses: Renal cancer, left (HCC)              Details   megestrol (MEGACE) 40 MG tablet Take 40 mg by mouth daily      morphine (MS CONTIN) 15 MG extended release tablet Take 15 mg by mouth every 12 hours. atorvastatin (LIPITOR) 40 MG tablet Take 1 tablet by mouth nightly  Qty: 30 tablet, Refills: 3      aspirin 81 MG EC tablet Take 1 tablet by mouth daily  Qty: 30 tablet, Refills: 3      MULTIPLE VITAMIN PO Take 1 tablet by mouth daily. Time Spent on discharge is more than 30 minutes in the examination, evaluation, counseling and review of medications and discharge plan. Signed:    Cliff Logan MD   12/15/2021      Thank you Selina Wilson MD for the opportunity to be involved in this patient's care. If you have any questions or concerns please feel free to contact me at [phone].

## (undated) DEVICE — SYRINGE MED 10ML TRNSLUC BRL PLUNG BLK MRK POLYPR CTRL

## (undated) DEVICE — COVER LT HNDL BLU PLAS

## (undated) DEVICE — BLADE ES ELASTOMERIC COAT INSUL DURABLE BEND UPTO 90DEG

## (undated) DEVICE — GAUZE,SPONGE,4"X4",8PLY,STRL,LF,10/TRAY: Brand: MEDLINE

## (undated) DEVICE — SHEET,DRAPE,40X58,STERILE: Brand: MEDLINE

## (undated) DEVICE — DECANTER FLD 9IN ST BG FOR ASEP TRNSF OF FLD

## (undated) DEVICE — MERCY FAIRFIELD TURNOVER KIT: Brand: MEDLINE INDUSTRIES, INC.

## (undated) DEVICE — SUTURE PROL SZ 2-0 L36IN NONABSORBABLE BLU SH L26MM 1/2 CIR 8523H

## (undated) DEVICE — HEADREST POS OD9IN THICKNESS 2IN RASPBERRY FOAM RNG CUSH

## (undated) DEVICE — SUTURE VCRL + SZ 4-0 L18IN ABSRB UD L19MM PS-2 3/8 CIR PRIM VCP496H

## (undated) DEVICE — MAJOR SET UP PK

## (undated) DEVICE — APPLICATOR MEDICATED 26 CC SOLUTION HI LT ORNG CHLORAPREP

## (undated) DEVICE — 20 ML SYRINGE LUER-LOCK TIP: Brand: MONOJECT

## (undated) DEVICE — SUTURE VCRL + SZ 3-0 L18IN ABSRB UD SH 1/2 CIR TAPERCUT NDL VCP864D

## (undated) DEVICE — BLADE CLIPPER GEN PURP NS

## (undated) DEVICE — C-ARM: Brand: UNBRANDED

## (undated) DEVICE — PENCIL ES L3M BTTN SWCH S STL HEX LOK BLDE ELECTRD HOLSTER

## (undated) DEVICE — NEEDLE HYPO 22GA L1.5IN BLK POLYPR HUB S STL REG BVL STR

## (undated) DEVICE — STERILE POLYISOPRENE POWDER-FREE SURGICAL GLOVES: Brand: PROTEXIS

## (undated) DEVICE — INTENDED FOR TISSUE SEPARATION, AND OTHER PROCEDURES THAT REQUIRE A SHARP SURGICAL BLADE TO PUNCTURE OR CUT.: Brand: BARD-PARKER ® STAINLESS STEEL BLADES

## (undated) DEVICE — GOWN SIRUS NONREIN XL W/TWL: Brand: MEDLINE INDUSTRIES, INC.

## (undated) DEVICE — DRAPE,T,LAPARO,TRANS,STERILE: Brand: MEDLINE

## (undated) DEVICE — ADHESIVE SKIN CLSR 0.7ML TOP DERMBND ADV